# Patient Record
Sex: MALE | Race: WHITE | Employment: OTHER | URBAN - METROPOLITAN AREA
[De-identification: names, ages, dates, MRNs, and addresses within clinical notes are randomized per-mention and may not be internally consistent; named-entity substitution may affect disease eponyms.]

---

## 2017-02-06 RX ORDER — DICLOFENAC SODIUM 75 MG/1
TABLET, DELAYED RELEASE ORAL
Qty: 60 TABLET | Refills: 0 | Status: SHIPPED | OUTPATIENT
Start: 2017-02-06 | End: 2017-09-27

## 2017-03-22 ENCOUNTER — OFFICE VISIT (OUTPATIENT)
Dept: FAMILY MEDICINE CLINIC | Age: 67
End: 2017-03-22

## 2017-03-22 VITALS
TEMPERATURE: 98.2 F | HEIGHT: 68 IN | DIASTOLIC BLOOD PRESSURE: 78 MMHG | BODY MASS INDEX: 43.95 KG/M2 | HEART RATE: 88 BPM | RESPIRATION RATE: 14 BRPM | WEIGHT: 290 LBS | SYSTOLIC BLOOD PRESSURE: 130 MMHG

## 2017-03-22 DIAGNOSIS — Z01.818 PRE-OP EVALUATION: Primary | ICD-10-CM

## 2017-03-22 PROCEDURE — G8427 DOCREV CUR MEDS BY ELIG CLIN: HCPCS | Performed by: INTERNAL MEDICINE

## 2017-03-22 PROCEDURE — G8484 FLU IMMUNIZE NO ADMIN: HCPCS | Performed by: INTERNAL MEDICINE

## 2017-03-22 PROCEDURE — 99214 OFFICE O/P EST MOD 30 MIN: CPT | Performed by: INTERNAL MEDICINE

## 2017-03-22 PROCEDURE — 3017F COLORECTAL CA SCREEN DOC REV: CPT | Performed by: INTERNAL MEDICINE

## 2017-03-22 PROCEDURE — 4040F PNEUMOC VAC/ADMIN/RCVD: CPT | Performed by: INTERNAL MEDICINE

## 2017-03-22 PROCEDURE — G8419 CALC BMI OUT NRM PARAM NOF/U: HCPCS | Performed by: INTERNAL MEDICINE

## 2017-03-22 PROCEDURE — 1036F TOBACCO NON-USER: CPT | Performed by: INTERNAL MEDICINE

## 2017-03-22 RX ORDER — CYCLOBENZAPRINE HCL 10 MG
10 TABLET ORAL 2 TIMES DAILY
COMMUNITY
Start: 2017-03-17 | End: 2017-09-27

## 2017-03-22 ASSESSMENT — ENCOUNTER SYMPTOMS
COUGH: 0
NAUSEA: 0
SHORTNESS OF BREATH: 0
BACK PAIN: 1
EYE REDNESS: 0
ABDOMINAL PAIN: 0

## 2017-07-12 DIAGNOSIS — R73.9 HYPERGLYCEMIA: ICD-10-CM

## 2017-07-12 DIAGNOSIS — E03.8 SUBCLINICAL HYPOTHYROIDISM: ICD-10-CM

## 2017-07-12 DIAGNOSIS — M1A.0790 IDIOPATHIC CHRONIC GOUT OF ANKLE WITHOUT TOPHUS, UNSPECIFIED LATERALITY: ICD-10-CM

## 2017-07-12 DIAGNOSIS — E55.9 VITAMIN D DEFICIENCY: Primary | ICD-10-CM

## 2017-07-12 DIAGNOSIS — E78.00 PURE HYPERCHOLESTEROLEMIA: ICD-10-CM

## 2017-07-12 RX ORDER — ERGOCALCIFEROL 1.25 MG/1
CAPSULE ORAL
Qty: 24 CAPSULE | Refills: 2 | OUTPATIENT
Start: 2017-07-12

## 2017-07-12 RX ORDER — ALLOPURINOL 300 MG/1
TABLET ORAL
Qty: 180 TABLET | Refills: 0 | Status: SHIPPED | OUTPATIENT
Start: 2017-07-12 | End: 2017-11-27 | Stop reason: SDUPTHER

## 2017-09-21 DIAGNOSIS — E55.9 VITAMIN D DEFICIENCY: ICD-10-CM

## 2017-09-21 DIAGNOSIS — E03.8 SUBCLINICAL HYPOTHYROIDISM: ICD-10-CM

## 2017-09-21 DIAGNOSIS — E78.00 PURE HYPERCHOLESTEROLEMIA: ICD-10-CM

## 2017-09-21 DIAGNOSIS — M1A.0790 IDIOPATHIC CHRONIC GOUT OF ANKLE WITHOUT TOPHUS, UNSPECIFIED LATERALITY: ICD-10-CM

## 2017-09-21 DIAGNOSIS — R73.9 HYPERGLYCEMIA: ICD-10-CM

## 2017-09-21 LAB
A/G RATIO: 1.7 (ref 1.1–2.2)
ALBUMIN SERPL-MCNC: 3.9 G/DL (ref 3.4–5)
ALP BLD-CCNC: 68 U/L (ref 40–129)
ALT SERPL-CCNC: 23 U/L (ref 10–40)
ANION GAP SERPL CALCULATED.3IONS-SCNC: 14 MMOL/L (ref 3–16)
AST SERPL-CCNC: 18 U/L (ref 15–37)
BASOPHILS ABSOLUTE: 0 K/UL (ref 0–0.2)
BASOPHILS RELATIVE PERCENT: 0.5 %
BILIRUB SERPL-MCNC: 0.6 MG/DL (ref 0–1)
BUN BLDV-MCNC: 18 MG/DL (ref 7–20)
CALCIUM SERPL-MCNC: 8.9 MG/DL (ref 8.3–10.6)
CHLORIDE BLD-SCNC: 102 MMOL/L (ref 99–110)
CO2: 27 MMOL/L (ref 21–32)
CREAT SERPL-MCNC: 1 MG/DL (ref 0.8–1.3)
EOSINOPHILS ABSOLUTE: 0.3 K/UL (ref 0–0.6)
EOSINOPHILS RELATIVE PERCENT: 5.1 %
ESTIMATED AVERAGE GLUCOSE: 111.2 MG/DL
GFR AFRICAN AMERICAN: >60
GFR NON-AFRICAN AMERICAN: >60
GLOBULIN: 2.3 G/DL
GLUCOSE BLD-MCNC: 129 MG/DL (ref 70–99)
HBA1C MFR BLD: 5.5 %
HCT VFR BLD CALC: 43.9 % (ref 40.5–52.5)
HEMOGLOBIN: 15 G/DL (ref 13.5–17.5)
LYMPHOCYTES ABSOLUTE: 2.6 K/UL (ref 1–5.1)
LYMPHOCYTES RELATIVE PERCENT: 38 %
MCH RBC QN AUTO: 32.4 PG (ref 26–34)
MCHC RBC AUTO-ENTMCNC: 34.2 G/DL (ref 31–36)
MCV RBC AUTO: 94.7 FL (ref 80–100)
MONOCYTES ABSOLUTE: 0.5 K/UL (ref 0–1.3)
MONOCYTES RELATIVE PERCENT: 7.5 %
NEUTROPHILS ABSOLUTE: 3.3 K/UL (ref 1.7–7.7)
NEUTROPHILS RELATIVE PERCENT: 48.9 %
PDW BLD-RTO: 14 % (ref 12.4–15.4)
PLATELET # BLD: 168 K/UL (ref 135–450)
PMV BLD AUTO: 10.1 FL (ref 5–10.5)
POTASSIUM SERPL-SCNC: 4.3 MMOL/L (ref 3.5–5.1)
RBC # BLD: 4.64 M/UL (ref 4.2–5.9)
SODIUM BLD-SCNC: 143 MMOL/L (ref 136–145)
T4 FREE: 1.1 NG/DL (ref 0.9–1.8)
TOTAL PROTEIN: 6.2 G/DL (ref 6.4–8.2)
TSH REFLEX: 7.9 UIU/ML (ref 0.27–4.2)
URIC ACID, SERUM: 5.5 MG/DL (ref 3.5–7.2)
VITAMIN D 25-HYDROXY: 31.3 NG/ML
WBC # BLD: 6.8 K/UL (ref 4–11)

## 2017-09-27 ENCOUNTER — OFFICE VISIT (OUTPATIENT)
Dept: FAMILY MEDICINE CLINIC | Age: 67
End: 2017-09-27

## 2017-09-27 VITALS
SYSTOLIC BLOOD PRESSURE: 142 MMHG | WEIGHT: 295 LBS | HEIGHT: 68 IN | HEART RATE: 96 BPM | DIASTOLIC BLOOD PRESSURE: 80 MMHG | BODY MASS INDEX: 44.71 KG/M2 | RESPIRATION RATE: 18 BRPM | OXYGEN SATURATION: 95 % | TEMPERATURE: 98.4 F

## 2017-09-27 DIAGNOSIS — Z23 NEED FOR INFLUENZA VACCINATION: Primary | ICD-10-CM

## 2017-09-27 DIAGNOSIS — G62.9 NEUROPATHY: ICD-10-CM

## 2017-09-27 LAB
FOLATE: 14.4 NG/ML (ref 4.78–24.2)
VITAMIN B-12: 549 PG/ML (ref 211–911)

## 2017-09-27 PROCEDURE — 3017F COLORECTAL CA SCREEN DOC REV: CPT | Performed by: INTERNAL MEDICINE

## 2017-09-27 PROCEDURE — G0008 ADMIN INFLUENZA VIRUS VAC: HCPCS | Performed by: INTERNAL MEDICINE

## 2017-09-27 PROCEDURE — 1123F ACP DISCUSS/DSCN MKR DOCD: CPT | Performed by: INTERNAL MEDICINE

## 2017-09-27 PROCEDURE — 1036F TOBACCO NON-USER: CPT | Performed by: INTERNAL MEDICINE

## 2017-09-27 PROCEDURE — G8419 CALC BMI OUT NRM PARAM NOF/U: HCPCS | Performed by: INTERNAL MEDICINE

## 2017-09-27 PROCEDURE — 90662 IIV NO PRSV INCREASED AG IM: CPT | Performed by: INTERNAL MEDICINE

## 2017-09-27 PROCEDURE — 4040F PNEUMOC VAC/ADMIN/RCVD: CPT | Performed by: INTERNAL MEDICINE

## 2017-09-27 PROCEDURE — 99214 OFFICE O/P EST MOD 30 MIN: CPT | Performed by: INTERNAL MEDICINE

## 2017-09-27 PROCEDURE — G8427 DOCREV CUR MEDS BY ELIG CLIN: HCPCS | Performed by: INTERNAL MEDICINE

## 2017-09-27 ASSESSMENT — ENCOUNTER SYMPTOMS
DIARRHEA: 0
CONSTIPATION: 0
WHEEZING: 0
SHORTNESS OF BREATH: 0

## 2017-09-27 ASSESSMENT — PATIENT HEALTH QUESTIONNAIRE - PHQ9
1. LITTLE INTEREST OR PLEASURE IN DOING THINGS: 0
SUM OF ALL RESPONSES TO PHQ9 QUESTIONS 1 & 2: 0
2. FEELING DOWN, DEPRESSED OR HOPELESS: 0
SUM OF ALL RESPONSES TO PHQ QUESTIONS 1-9: 0

## 2017-10-05 ENCOUNTER — TELEPHONE (OUTPATIENT)
Dept: FAMILY MEDICINE CLINIC | Age: 67
End: 2017-10-05

## 2017-10-05 NOTE — TELEPHONE ENCOUNTER
Pt called and stated he had blood work done and that depending on the results you were going to prescribe something for neuropathy  Please call pt

## 2017-10-10 ENCOUNTER — TELEPHONE (OUTPATIENT)
Dept: FAMILY MEDICINE CLINIC | Age: 67
End: 2017-10-10

## 2017-10-10 RX ORDER — GABAPENTIN 100 MG/1
CAPSULE ORAL
Qty: 60 CAPSULE | Refills: 0 | Status: SHIPPED | OUTPATIENT
Start: 2017-10-10 | End: 2017-11-08 | Stop reason: SDUPTHER

## 2017-10-10 NOTE — TELEPHONE ENCOUNTER
Controlled Substances Monitoring:      reviewed today  Spoke to pt  Will start the neurontin    rx sent to pharmacy  Fu in one month

## 2017-11-08 ENCOUNTER — TELEPHONE (OUTPATIENT)
Dept: FAMILY MEDICINE CLINIC | Age: 67
End: 2017-11-08

## 2017-11-08 RX ORDER — GABAPENTIN 100 MG/1
CAPSULE ORAL
Qty: 60 CAPSULE | Refills: 0 | Status: SHIPPED | OUTPATIENT
Start: 2017-11-08 | End: 2017-11-27

## 2017-11-08 RX ORDER — GABAPENTIN 100 MG/1
CAPSULE ORAL
Qty: 60 CAPSULE | Refills: 0 | Status: CANCELLED | OUTPATIENT
Start: 2017-11-08

## 2017-11-08 NOTE — TELEPHONE ENCOUNTER
Pt called and said he was prescribed gabapentin and he is taking 3 a day and needs a new script sent over to pharmacy. old script was 1 a day then 2 a day.  Pt has 3 pills left

## 2017-11-09 ENCOUNTER — TELEPHONE (OUTPATIENT)
Dept: FAMILY MEDICINE CLINIC | Age: 67
End: 2017-11-09

## 2017-11-09 NOTE — TELEPHONE ENCOUNTER
Spoke to pt  Unable to come in tmrw  Has appt on 11/27 and will come in then  Has been taking 3 gabapentin at bedtime  Is aware refill was for #60 pills

## 2017-11-27 ENCOUNTER — OFFICE VISIT (OUTPATIENT)
Dept: FAMILY MEDICINE CLINIC | Age: 67
End: 2017-11-27

## 2017-11-27 VITALS
HEART RATE: 100 BPM | OXYGEN SATURATION: 96 % | BODY MASS INDEX: 43.65 KG/M2 | HEIGHT: 68 IN | DIASTOLIC BLOOD PRESSURE: 84 MMHG | SYSTOLIC BLOOD PRESSURE: 136 MMHG | WEIGHT: 288 LBS | RESPIRATION RATE: 18 BRPM

## 2017-11-27 DIAGNOSIS — E03.9 HYPOTHYROIDISM, UNSPECIFIED TYPE: ICD-10-CM

## 2017-11-27 DIAGNOSIS — M48.00 SPINAL STENOSIS, UNSPECIFIED SPINAL REGION: ICD-10-CM

## 2017-11-27 DIAGNOSIS — G57.93 NEUROPATHIC PAIN OF BOTH LEGS: Primary | ICD-10-CM

## 2017-11-27 DIAGNOSIS — Z13.6 SCREENING FOR AAA (AORTIC ABDOMINAL ANEURYSM): ICD-10-CM

## 2017-11-27 DIAGNOSIS — E78.00 PURE HYPERCHOLESTEROLEMIA: ICD-10-CM

## 2017-11-27 PROCEDURE — 4040F PNEUMOC VAC/ADMIN/RCVD: CPT | Performed by: INTERNAL MEDICINE

## 2017-11-27 PROCEDURE — G8427 DOCREV CUR MEDS BY ELIG CLIN: HCPCS | Performed by: INTERNAL MEDICINE

## 2017-11-27 PROCEDURE — G8417 CALC BMI ABV UP PARAM F/U: HCPCS | Performed by: INTERNAL MEDICINE

## 2017-11-27 PROCEDURE — G8484 FLU IMMUNIZE NO ADMIN: HCPCS | Performed by: INTERNAL MEDICINE

## 2017-11-27 PROCEDURE — 1036F TOBACCO NON-USER: CPT | Performed by: INTERNAL MEDICINE

## 2017-11-27 PROCEDURE — 99214 OFFICE O/P EST MOD 30 MIN: CPT | Performed by: INTERNAL MEDICINE

## 2017-11-27 PROCEDURE — 1123F ACP DISCUSS/DSCN MKR DOCD: CPT | Performed by: INTERNAL MEDICINE

## 2017-11-27 PROCEDURE — 3017F COLORECTAL CA SCREEN DOC REV: CPT | Performed by: INTERNAL MEDICINE

## 2017-11-27 RX ORDER — LEVOTHYROXINE SODIUM 100 UG/1
1 CAPSULE ORAL DAILY
Qty: 30 CAPSULE | Refills: 3 | Status: SHIPPED | OUTPATIENT
Start: 2017-11-27 | End: 2018-04-18 | Stop reason: SDUPTHER

## 2017-11-27 RX ORDER — ALLOPURINOL 300 MG/1
300 TABLET ORAL DAILY
Qty: 90 TABLET | Refills: 0 | COMMUNITY
Start: 2017-11-27 | End: 2018-11-14 | Stop reason: SDUPTHER

## 2017-11-27 ASSESSMENT — ENCOUNTER SYMPTOMS
VOMITING: 0
NAUSEA: 0
SHORTNESS OF BREATH: 0
WHEEZING: 0

## 2017-11-27 NOTE — PROGRESS NOTES
11/27/2017    This is a 75 y.o. male   Chief Complaint   Patient presents with    Peripheral Neuropathy     discuss gabapentin   .taking gabapentin    Currently  Taking 300mg gabapentin for neuropathy and it is working well. No numbness /tingling in the feet after her takes the med  By the end of the day symptoms come back sometimes strong. Does  Not wake him up    Has a pinched nerve in the back , had a mri back recently. Dr jesus - was sent to  [facility] ( spine )   Saw him nov  15,  Was told he had a pinched nerve. Had been taking flexeril and stopped taking it  Doing PT  Now. Helping. Had back surgery 2013 or 14  . Getting better overall  Was told to come back if it gets worse. Father had AAA,  Pt never been screened    Allopurinol  300 mg /day instead of 600 mg/day and uric acid level was ok  At  5.5        HPI    Review of Systems   Constitutional: Negative for appetite change and unexpected weight change. Respiratory: Negative for shortness of breath and wheezing. Gastrointestinal: Negative for nausea and vomiting. Psychiatric/Behavioral: Negative for dysphoric mood. The patient is not nervous/anxious. Past Medical History:   Diagnosis Date    Allergic rhinitis, seasonal     Bacterial meningitis     HX OF     Congenital absence of kidney     born with one kidney    Diverticulitis, colon     Gout     Gynecomastia     Hx of blood clots     postop knee replacement    Hydrocele, left     Lumbar disc disorder     Spermatocele     LEFT    Talipes cavus     Tinnitus     Wears contact lenses        Prior to Visit Medications    Medication Sig Taking? Authorizing Provider   gabapentin (NEURONTIN) 100 MG capsule 2 pill nightly  Patient taking differently: Take 300 mg by mouth nightly 2 pill nightly Yes Lawyer Tessa MD   allopurinol (ZYLOPRIM) 300 MG tablet TAKE ONE TABLET BY MOUTH TWICE A DAY Yes Lawyer Tessa MD   Psyllium (METAMUCIL PO) Take  by mouth daily.  Yes PO) Take  by mouth daily.  Multiple Vitamins-Minerals (MULTIVITAL) TABS Take 1 tablet by mouth daily.  Melatonin-Pyridoxine (MELATIN) 3-1 MG TABS Take 3 mg by mouth as needed. No current facility-administered medications for this visit. Allergies   Allergen Reactions    No Known Allergies        /84 (Site: Right Arm, Position: Sitting, Cuff Size: Large Adult)   Pulse 100   Resp 18   Ht 5' 8\" (1.727 m)   Wt 288 lb (130.6 kg)   SpO2 96%   BMI 43.79 kg/m²     Physical Exam   Constitutional: He appears well-developed and well-nourished. HENT:   Head: Normocephalic and atraumatic. Cardiovascular: Normal rate and regular rhythm. No murmur heard. Pulmonary/Chest: Breath sounds normal. He has no wheezes. Skin: Skin is warm and dry. Psychiatric: He has a normal mood and affect. His behavior is normal. Judgment and thought content normal.   has gang cyst in the left hand ,  Palm  Not painful    Thyroid not enlarged    abd large scar,  No pain  No palpable aorta  Large vent hernia    Wt Readings from Last 3 Encounters:   17 288 lb (130.6 kg)   17 295 lb (133.8 kg)   17 290 lb (131.5 kg)       BP Readings from Last 3 Encounters:   17 136/84   17 (!) 142/80   17 130/78         Garrett Henriquez was seen today for peripheral neuropathy. Diagnoses and all orders for this visit:    Neuropathic pain of both legs ([address]- on neurontin  300 mg /night      Screening for AAA (aortic abdominal aneurysm) father  AAA  -     US Abdominal Aorta;  Future    Spinal stenosis, unspecified spinal region    Hypothyroidism  tsh was  7.90  Brother  of aggressive thyroid cancer  -start levothyroxine   100 mcg /day  -re ck tsh 6 week      Will refill the neurontin when due   Get us to ro AAA    Leave the gang cyst alone,  No surgery needed    Fu in 3 months

## 2017-12-04 ENCOUNTER — TELEPHONE (OUTPATIENT)
Dept: FAMILY MEDICINE CLINIC | Age: 67
End: 2017-12-04

## 2017-12-04 RX ORDER — ALLOPURINOL 300 MG/1
300 TABLET ORAL EVERY EVENING
Qty: 30 TABLET | Refills: 1 | Status: SHIPPED | OUTPATIENT
Start: 2017-12-04 | End: 2018-01-12 | Stop reason: SDUPTHER

## 2017-12-04 NOTE — TELEPHONE ENCOUNTER
Pt called said he is a pt with dr Krish Rowe and the next time he needed to order gabepentin to call and she was going to increase it when out. He would like this sent to pharm below        [facility] [address].

## 2017-12-05 ENCOUNTER — TELEPHONE (OUTPATIENT)
Dept: FAMILY MEDICINE CLINIC | Age: 67
End: 2017-12-05

## 2017-12-05 RX ORDER — GABAPENTIN 300 MG/1
300 CAPSULE ORAL NIGHTLY
Qty: 30 CAPSULE | Refills: 1 | Status: SHIPPED | OUTPATIENT
Start: 2017-12-05 | End: 2018-02-08 | Stop reason: SDUPTHER

## 2017-12-05 RX ORDER — GABAPENTIN 100 MG/1
CAPSULE ORAL
Qty: 60 CAPSULE | Refills: 0 | Status: CANCELLED | OUTPATIENT
Start: 2017-12-05

## 2017-12-11 ENCOUNTER — HOSPITAL ENCOUNTER (OUTPATIENT)
Dept: ULTRASOUND IMAGING | Age: 67
Discharge: HOME | End: 2017-12-11
Attending: INTERNAL MEDICINE | Admitting: INTERNAL MEDICINE

## 2017-12-11 DIAGNOSIS — Z13.6 SCREENING FOR AAA (AORTIC ABDOMINAL ANEURYSM): ICD-10-CM

## 2017-12-11 DIAGNOSIS — Z13.6 ENCOUNTER FOR SCREENING FOR CARDIOVASCULAR DISORDERS: ICD-10-CM

## 2018-01-12 ENCOUNTER — OFFICE VISIT (OUTPATIENT)
Dept: FAMILY MEDICINE CLINIC | Age: 68
End: 2018-01-12

## 2018-01-12 VITALS
HEART RATE: 98 BPM | OXYGEN SATURATION: 96 % | BODY MASS INDEX: 44.71 KG/M2 | SYSTOLIC BLOOD PRESSURE: 160 MMHG | DIASTOLIC BLOOD PRESSURE: 80 MMHG | HEIGHT: 68 IN | RESPIRATION RATE: 18 BRPM | WEIGHT: 295 LBS

## 2018-01-12 DIAGNOSIS — S06.5XAA SUBDURAL HEMATOMA: Primary | ICD-10-CM

## 2018-01-12 DIAGNOSIS — R68.84 JAW PAIN: ICD-10-CM

## 2018-01-12 DIAGNOSIS — R51.9 ACUTE INTRACTABLE HEADACHE, UNSPECIFIED HEADACHE TYPE: ICD-10-CM

## 2018-01-12 PROCEDURE — G8484 FLU IMMUNIZE NO ADMIN: HCPCS | Performed by: INTERNAL MEDICINE

## 2018-01-12 PROCEDURE — 4040F PNEUMOC VAC/ADMIN/RCVD: CPT | Performed by: INTERNAL MEDICINE

## 2018-01-12 PROCEDURE — 1036F TOBACCO NON-USER: CPT | Performed by: INTERNAL MEDICINE

## 2018-01-12 PROCEDURE — G8417 CALC BMI ABV UP PARAM F/U: HCPCS | Performed by: INTERNAL MEDICINE

## 2018-01-12 PROCEDURE — 3017F COLORECTAL CA SCREEN DOC REV: CPT | Performed by: INTERNAL MEDICINE

## 2018-01-12 PROCEDURE — 99214 OFFICE O/P EST MOD 30 MIN: CPT | Performed by: INTERNAL MEDICINE

## 2018-01-12 PROCEDURE — 1123F ACP DISCUSS/DSCN MKR DOCD: CPT | Performed by: INTERNAL MEDICINE

## 2018-01-12 PROCEDURE — G8427 DOCREV CUR MEDS BY ELIG CLIN: HCPCS | Performed by: INTERNAL MEDICINE

## 2018-01-12 RX ORDER — HYDROCODONE BITARTRATE AND ACETAMINOPHEN 5; 325 MG/1; MG/1
1 TABLET ORAL
COMMUNITY
Start: 2018-01-10 | End: 2018-01-17

## 2018-01-12 ASSESSMENT — ENCOUNTER SYMPTOMS
SHORTNESS OF BREATH: 0
DIARRHEA: 0
WHEEZING: 0
CONSTIPATION: 1

## 2018-01-17 ENCOUNTER — TELEPHONE (OUTPATIENT)
Dept: FAMILY MEDICINE CLINIC | Age: 68
End: 2018-01-17

## 2018-01-17 ENCOUNTER — HOSPITAL ENCOUNTER (OUTPATIENT)
Dept: CT IMAGING | Age: 68
Discharge: HOME | End: 2018-01-17
Admitting: INTERNAL MEDICINE

## 2018-01-17 ENCOUNTER — OFFICE VISIT (OUTPATIENT)
Dept: FAMILY MEDICINE CLINIC | Age: 68
End: 2018-01-17

## 2018-01-17 VITALS
BODY MASS INDEX: 44.25 KG/M2 | DIASTOLIC BLOOD PRESSURE: 86 MMHG | WEIGHT: 292 LBS | SYSTOLIC BLOOD PRESSURE: 144 MMHG | OXYGEN SATURATION: 99 % | HEART RATE: 97 BPM | HEIGHT: 68 IN | RESPIRATION RATE: 16 BRPM

## 2018-01-17 DIAGNOSIS — S06.5XAA SUBDURAL HEMATOMA: Primary | ICD-10-CM

## 2018-01-17 DIAGNOSIS — R68.84 JAW PAIN: Primary | ICD-10-CM

## 2018-01-17 DIAGNOSIS — S06.5XAA SUBDURAL HEMATOMA: ICD-10-CM

## 2018-01-17 DIAGNOSIS — G44.329 CHRONIC POST-TRAUMATIC HEADACHE, NOT INTRACTABLE: ICD-10-CM

## 2018-01-17 DIAGNOSIS — R68.84 JAW PAIN: ICD-10-CM

## 2018-01-17 DIAGNOSIS — I62.00 NONTRAUMATIC SUBDURAL HEMORRHAGE (HCC): ICD-10-CM

## 2018-01-17 PROCEDURE — 3017F COLORECTAL CA SCREEN DOC REV: CPT | Performed by: INTERNAL MEDICINE

## 2018-01-17 PROCEDURE — 1123F ACP DISCUSS/DSCN MKR DOCD: CPT | Performed by: INTERNAL MEDICINE

## 2018-01-17 PROCEDURE — G8484 FLU IMMUNIZE NO ADMIN: HCPCS | Performed by: INTERNAL MEDICINE

## 2018-01-17 PROCEDURE — 99214 OFFICE O/P EST MOD 30 MIN: CPT | Performed by: INTERNAL MEDICINE

## 2018-01-17 PROCEDURE — 4040F PNEUMOC VAC/ADMIN/RCVD: CPT | Performed by: INTERNAL MEDICINE

## 2018-01-17 PROCEDURE — G8417 CALC BMI ABV UP PARAM F/U: HCPCS | Performed by: INTERNAL MEDICINE

## 2018-01-17 PROCEDURE — G8427 DOCREV CUR MEDS BY ELIG CLIN: HCPCS | Performed by: INTERNAL MEDICINE

## 2018-01-17 PROCEDURE — 1036F TOBACCO NON-USER: CPT | Performed by: INTERNAL MEDICINE

## 2018-01-17 RX ORDER — OXYCODONE HYDROCHLORIDE AND ACETAMINOPHEN 5; 325 MG/1; MG/1
TABLET ORAL
Qty: 30 TABLET | Refills: 0 | Status: SHIPPED | OUTPATIENT
Start: 2018-01-17 | End: 2018-01-26 | Stop reason: SDUPTHER

## 2018-01-17 ASSESSMENT — ENCOUNTER SYMPTOMS
NAUSEA: 1
VOMITING: 0
EYE PAIN: 0

## 2018-01-17 NOTE — PROGRESS NOTES
(METAMUCIL PO) Take  by mouth daily. Yes Historical Provider, MD   Multiple Vitamins-Minerals (MULTIVITAL) TABS Take 1 tablet by mouth daily. Yes Historical Provider, MD       Past Surgical History:   Procedure Laterality Date    BREAST LUMPECTOMY Left 08/12/2014    lipoma    BREAST SURGERY Right 2/4/15    removal of lipoma right breast.    CHOLECYSTECTOMY      COLECTOMY  1996    partial for diverticulitis    COLONOSCOPY      HYDROCELE EXCISION      and spermatacele excision    KNEE ARTHROSCOPY  9/12    left --dr Mckenna Oas HISTORY  10/21/2013    BILATERAL DECOMPRESSIVE LUMBAR LAMINECTOMY L4-L5, POSSIBLE    TOTAL KNEE ARTHROPLASTY Left 5/17/13    [facility]        Social History     Social History    Marital status:      Spouse name: Nancy Calle Number of children: 4    Years of education: N/A     Occupational History    Retired--Construction       Social History Main Topics    Smoking status: Never Smoker    Smokeless tobacco: Never Used      Comment: counseled on tobacco exposure avoidance    Alcohol use 0.0 oz/week      Comment: Ocassionally     Drug use: No    Sexual activity: Yes     Partners: Female     Other Topics Concern    Not on file     Social History Narrative    No narrative on file       Family History   Problem Relation Age of Onset    Cancer Mother 59     LUNG    Other Father 72     AORTIC ANEURYSM    Cancer Sister      BREAST     Other Brother      HIV    Arthritis Other     Kidney Disease Other        Current Outpatient Prescriptions   Medication Sig Dispense Refill    Acetaminophen (TYLENOL) 325 MG CAPS Take 325-650 mg by mouth      HYDROcodone-acetaminophen (NORCO) 5-325 MG per tablet Take 1 tablet by mouth.       gabapentin (NEURONTIN) 300 MG capsule Take 1 capsule by mouth nightly 30 capsule 1    allopurinol (ZYLOPRIM) 300 MG tablet Take 1 tablet by mouth daily 90 taken neurontin  For  8 days. Repeat ct showed resolving subdural  maxofacial ct   No fx  See oral surgery  About jaw issue  Family will try to get pt seen with [facility] who he has seen before  otw has appt in  2 weeks at [facility].

## 2018-01-26 DIAGNOSIS — S06.5XAA SUBDURAL HEMATOMA: ICD-10-CM

## 2018-01-26 DIAGNOSIS — G44.329 CHRONIC POST-TRAUMATIC HEADACHE, NOT INTRACTABLE: ICD-10-CM

## 2018-01-26 RX ORDER — OXYCODONE HYDROCHLORIDE AND ACETAMINOPHEN 5; 325 MG/1; MG/1
TABLET ORAL
Qty: 16 TABLET | Refills: 0 | Status: SHIPPED | OUTPATIENT
Start: 2018-01-26 | End: 2018-01-29

## 2018-02-06 DIAGNOSIS — E03.9 HYPOTHYROIDISM, UNSPECIFIED TYPE: ICD-10-CM

## 2018-02-06 LAB — TSH SERPL DL<=0.05 MIU/L-ACNC: 2.27 UIU/ML (ref 0.27–4.2)

## 2018-02-09 RX ORDER — GABAPENTIN 300 MG/1
CAPSULE ORAL
Qty: 30 CAPSULE | Refills: 0 | Status: SHIPPED | OUTPATIENT
Start: 2018-02-09 | End: 2018-03-12 | Stop reason: SDUPTHER

## 2018-02-27 ENCOUNTER — OFFICE VISIT (OUTPATIENT)
Dept: FAMILY MEDICINE CLINIC | Age: 68
End: 2018-02-27

## 2018-02-27 VITALS
HEIGHT: 68 IN | HEART RATE: 96 BPM | BODY MASS INDEX: 45.01 KG/M2 | RESPIRATION RATE: 16 BRPM | WEIGHT: 297 LBS | OXYGEN SATURATION: 96 % | SYSTOLIC BLOOD PRESSURE: 144 MMHG | DIASTOLIC BLOOD PRESSURE: 80 MMHG

## 2018-02-27 DIAGNOSIS — E03.8 OTHER SPECIFIED HYPOTHYROIDISM: Primary | ICD-10-CM

## 2018-02-27 DIAGNOSIS — S06.5XAA SUBDURAL HEMATOMA: ICD-10-CM

## 2018-02-27 DIAGNOSIS — R03.0 ELEVATED BP WITHOUT DIAGNOSIS OF HYPERTENSION: ICD-10-CM

## 2018-02-27 DIAGNOSIS — N52.9 ERECTILE DYSFUNCTION, UNSPECIFIED ERECTILE DYSFUNCTION TYPE: ICD-10-CM

## 2018-02-27 DIAGNOSIS — I82.492 DEEP VEIN THROMBOSIS (DVT) OF OTHER VEIN OF LEFT LOWER EXTREMITY, UNSPECIFIED CHRONICITY (HCC): ICD-10-CM

## 2018-02-27 PROCEDURE — 1123F ACP DISCUSS/DSCN MKR DOCD: CPT | Performed by: INTERNAL MEDICINE

## 2018-02-27 PROCEDURE — 3017F COLORECTAL CA SCREEN DOC REV: CPT | Performed by: INTERNAL MEDICINE

## 2018-02-27 PROCEDURE — G8484 FLU IMMUNIZE NO ADMIN: HCPCS | Performed by: INTERNAL MEDICINE

## 2018-02-27 PROCEDURE — 4040F PNEUMOC VAC/ADMIN/RCVD: CPT | Performed by: INTERNAL MEDICINE

## 2018-02-27 PROCEDURE — G8417 CALC BMI ABV UP PARAM F/U: HCPCS | Performed by: INTERNAL MEDICINE

## 2018-02-27 PROCEDURE — 1036F TOBACCO NON-USER: CPT | Performed by: INTERNAL MEDICINE

## 2018-02-27 PROCEDURE — 99214 OFFICE O/P EST MOD 30 MIN: CPT | Performed by: INTERNAL MEDICINE

## 2018-02-27 PROCEDURE — G8427 DOCREV CUR MEDS BY ELIG CLIN: HCPCS | Performed by: INTERNAL MEDICINE

## 2018-02-27 RX ORDER — SILDENAFIL CITRATE 20 MG/1
TABLET ORAL
Qty: 30 TABLET | Refills: 1 | Status: SHIPPED | OUTPATIENT
Start: 2018-02-27 | End: 2019-05-06 | Stop reason: SDUPTHER

## 2018-02-27 ASSESSMENT — ENCOUNTER SYMPTOMS
SHORTNESS OF BREATH: 0
DIARRHEA: 0
NAUSEA: 0
WHEEZING: 0

## 2018-02-27 NOTE — PROGRESS NOTES
2/27/2018    This is a 70 y.o. male   Chief Complaint   Patient presents with    Neurologic Problem     f/u neuropathy     Med really helps with both feet. On 300mg nightly , will keep him on the same dose. No problems in the day.   -gabapentin  Causes not to maintain an erection  Took viagra in the past too expensive      Saw neurosurgery at [facility] light really bother him. Can give him headaches. Occasionally gets headaches  Was told he did not need to come back. Will start  [employer]  Has been very inactive lately  Feeling better. HPI    Review of Systems   Constitutional: Negative for appetite change and unexpected weight change. Respiratory: Negative for shortness of breath and wheezing. Gastrointestinal: Negative for diarrhea and nausea. Neurological: Positive for dizziness (with lying down flat) and headaches. Past Medical History:   Diagnosis Date    Allergic rhinitis, seasonal     Bacterial meningitis     HX OF     Congenital absence of kidney     born with one kidney    Diverticulitis, colon     Gout     Gynecomastia     Hx of blood clots     postop knee replacement    Hydrocele, left     Lumbar disc disorder     Spermatocele     LEFT    Talipes cavus     Tinnitus     Wears contact lenses        Prior to Visit Medications    Medication Sig Taking? Authorizing Provider   gabapentin (NEURONTIN) 300 MG capsule TAKE ONE CAPSULE BY MOUTH ONCE NIGHTLY Yes Giuseppe Kelly MD   allopurinol (ZYLOPRIM) 300 MG tablet Take 1 tablet by mouth daily Yes Giuseppe Kelly MD   Levothyroxine Sodium 100 MCG CAPS Take 1 tablet by mouth Daily Yes Giuseppe Kelly MD   Psyllium (METAMUCIL PO) Take  by mouth daily. Yes Historical Provider, MD   Multiple Vitamins-Minerals (MULTIVITAL) TABS Take 1 tablet by mouth daily.  Yes Historical Provider, MD   Acetaminophen (TYLENOL) 325 MG CAPS Take 325-650 mg by mouth  Historical Provider, MD   Melatonin-Pyridoxine (Earney Lites) 3-1 MG MG CAPS Take 325-650 mg by mouth      Melatonin-Pyridoxine (MELATIN) 3-1 MG TABS Take 3 mg by mouth as needed. No current facility-administered medications for this visit. Allergies   Allergen Reactions    No Known Allergies        BP (!) 144/80 (Site: Left Arm, Position: Sitting, Cuff Size: Large Adult)   Pulse 96   Resp 16   Ht 5' 8\" (1.727 m)   Wt 297 lb (134.7 kg)   SpO2 96%   BMI 45.16 kg/m²     Physical Exam   Constitutional: He appears well-developed and well-nourished. HENT:   Head: Normocephalic and atraumatic. Eyes: Conjunctivae are normal.   Cardiovascular: Normal rate and regular rhythm. No murmur heard. Pulmonary/Chest: Breath sounds normal. He has no wheezes. Abdominal: Soft. There is no tenderness. Musculoskeletal: He exhibits edema (trace edema IN THE LEGS BILAT). Skin: Skin is warm and dry. Psychiatric: He has a normal mood and affect. His behavior is normal. Judgment and thought content normal.       Wt Readings from Last 3 Encounters:   02/27/18 297 lb (134.7 kg)   01/17/18 292 lb (132.5 kg)   01/12/18 295 lb (133.8 kg)       BP Readings from Last 3 Encounters:   02/27/18 (!) 144/80   01/17/18 (!) 144/86   01/12/18 (!) 160/80         Garrett Painting was seen today for neurologic problem. Diagnoses and all orders for this visit:    Other specified hypothyroidism  TSH  OK     Subdural hematoma (HCC)-1/9/18    Elevated BP without diagnosis of hypertension      Other orders  -     sildenafil (REVATIO) 20 MG tablet; Take  1-2 pills before relations. WILL NEED TO MONITOR BP.    ORDERED REVATIO  FOR ED DUE TO THE NEURONTIN. HAD DVT  2013 AFTER SURGERY,  NO LONGER ON BLOOD THINNERS.   FU IN  3 MONTHS

## 2018-03-13 RX ORDER — GABAPENTIN 300 MG/1
CAPSULE ORAL
Qty: 30 CAPSULE | Refills: 0 | Status: SHIPPED | OUTPATIENT
Start: 2018-03-13 | End: 2018-04-18 | Stop reason: SDUPTHER

## 2018-05-29 ENCOUNTER — OFFICE VISIT (OUTPATIENT)
Dept: FAMILY MEDICINE CLINIC | Age: 68
End: 2018-05-29

## 2018-05-29 VITALS
OXYGEN SATURATION: 96 % | SYSTOLIC BLOOD PRESSURE: 136 MMHG | DIASTOLIC BLOOD PRESSURE: 80 MMHG | RESPIRATION RATE: 16 BRPM | WEIGHT: 292 LBS | BODY MASS INDEX: 44.25 KG/M2 | HEIGHT: 68 IN | HEART RATE: 80 BPM

## 2018-05-29 DIAGNOSIS — R73.9 HYPERGLYCEMIA: ICD-10-CM

## 2018-05-29 DIAGNOSIS — E03.8 OTHER SPECIFIED HYPOTHYROIDISM: ICD-10-CM

## 2018-05-29 DIAGNOSIS — I10 ESSENTIAL HYPERTENSION: Primary | ICD-10-CM

## 2018-05-29 DIAGNOSIS — D72.829 LEUKOCYTOSIS, UNSPECIFIED TYPE: ICD-10-CM

## 2018-05-29 DIAGNOSIS — I10 ESSENTIAL HYPERTENSION: ICD-10-CM

## 2018-05-29 DIAGNOSIS — Z13.220 LIPID SCREENING: ICD-10-CM

## 2018-05-29 DIAGNOSIS — S06.5XAA SUBDURAL HEMATOMA: ICD-10-CM

## 2018-05-29 LAB
A/G RATIO: 2.2 (ref 1.1–2.2)
ALBUMIN SERPL-MCNC: 4.2 G/DL (ref 3.4–5)
ALP BLD-CCNC: 63 U/L (ref 40–129)
ALT SERPL-CCNC: 19 U/L (ref 10–40)
ANION GAP SERPL CALCULATED.3IONS-SCNC: 15 MMOL/L (ref 3–16)
AST SERPL-CCNC: 17 U/L (ref 15–37)
BASOPHILS ABSOLUTE: 0.1 K/UL (ref 0–0.2)
BASOPHILS RELATIVE PERCENT: 0.9 %
BILIRUB SERPL-MCNC: 0.4 MG/DL (ref 0–1)
BUN BLDV-MCNC: 16 MG/DL (ref 7–20)
CALCIUM SERPL-MCNC: 8.7 MG/DL (ref 8.3–10.6)
CHLORIDE BLD-SCNC: 107 MMOL/L (ref 99–110)
CHOLESTEROL, TOTAL: 129 MG/DL (ref 0–199)
CO2: 22 MMOL/L (ref 21–32)
CREAT SERPL-MCNC: 0.9 MG/DL (ref 0.8–1.3)
EOSINOPHILS ABSOLUTE: 0.2 K/UL (ref 0–0.6)
EOSINOPHILS RELATIVE PERCENT: 3.8 %
GFR AFRICAN AMERICAN: >60
GFR NON-AFRICAN AMERICAN: >60
GLOBULIN: 1.9 G/DL
GLUCOSE BLD-MCNC: 134 MG/DL (ref 70–99)
HCT VFR BLD CALC: 44.5 % (ref 40.5–52.5)
HDLC SERPL-MCNC: 47 MG/DL (ref 40–60)
HEMOGLOBIN: 15.2 G/DL (ref 13.5–17.5)
LDL CHOLESTEROL CALCULATED: 63 MG/DL
LYMPHOCYTES ABSOLUTE: 1.8 K/UL (ref 1–5.1)
LYMPHOCYTES RELATIVE PERCENT: 28.3 %
MCH RBC QN AUTO: 32.4 PG (ref 26–34)
MCHC RBC AUTO-ENTMCNC: 34.1 G/DL (ref 31–36)
MCV RBC AUTO: 94.9 FL (ref 80–100)
MONOCYTES ABSOLUTE: 0.5 K/UL (ref 0–1.3)
MONOCYTES RELATIVE PERCENT: 7.8 %
NEUTROPHILS ABSOLUTE: 3.8 K/UL (ref 1.7–7.7)
NEUTROPHILS RELATIVE PERCENT: 59.2 %
PDW BLD-RTO: 13.6 % (ref 12.4–15.4)
PLATELET # BLD: 179 K/UL (ref 135–450)
PMV BLD AUTO: 10.1 FL (ref 5–10.5)
POTASSIUM SERPL-SCNC: 4.4 MMOL/L (ref 3.5–5.1)
RBC # BLD: 4.69 M/UL (ref 4.2–5.9)
SODIUM BLD-SCNC: 144 MMOL/L (ref 136–145)
TOTAL PROTEIN: 6.1 G/DL (ref 6.4–8.2)
TRIGL SERPL-MCNC: 94 MG/DL (ref 0–150)
TSH REFLEX: 1.9 UIU/ML (ref 0.27–4.2)
VLDLC SERPL CALC-MCNC: 19 MG/DL
WBC # BLD: 6.5 K/UL (ref 4–11)

## 2018-05-29 PROCEDURE — 4040F PNEUMOC VAC/ADMIN/RCVD: CPT | Performed by: INTERNAL MEDICINE

## 2018-05-29 PROCEDURE — G8427 DOCREV CUR MEDS BY ELIG CLIN: HCPCS | Performed by: INTERNAL MEDICINE

## 2018-05-29 PROCEDURE — 99214 OFFICE O/P EST MOD 30 MIN: CPT | Performed by: INTERNAL MEDICINE

## 2018-05-29 PROCEDURE — 1123F ACP DISCUSS/DSCN MKR DOCD: CPT | Performed by: INTERNAL MEDICINE

## 2018-05-29 PROCEDURE — 1036F TOBACCO NON-USER: CPT | Performed by: INTERNAL MEDICINE

## 2018-05-29 PROCEDURE — 3017F COLORECTAL CA SCREEN DOC REV: CPT | Performed by: INTERNAL MEDICINE

## 2018-05-29 PROCEDURE — G8417 CALC BMI ABV UP PARAM F/U: HCPCS | Performed by: INTERNAL MEDICINE

## 2018-05-29 RX ORDER — AMLODIPINE BESYLATE 5 MG/1
5 TABLET ORAL DAILY
Qty: 30 TABLET | Refills: 3 | Status: SHIPPED | OUTPATIENT
Start: 2018-05-29 | End: 2018-06-29

## 2018-05-29 ASSESSMENT — ENCOUNTER SYMPTOMS
SHORTNESS OF BREATH: 0
WHEEZING: 0
DIARRHEA: 0
CONSTIPATION: 0

## 2018-05-30 LAB
ESTIMATED AVERAGE GLUCOSE: 114 MG/DL
HBA1C MFR BLD: 5.6 %

## 2018-06-29 ENCOUNTER — HOSPITAL ENCOUNTER (OUTPATIENT)
Dept: CT IMAGING | Age: 68
Discharge: HOME | End: 2018-06-29
Attending: INTERNAL MEDICINE | Admitting: INTERNAL MEDICINE

## 2018-06-29 ENCOUNTER — OFFICE VISIT (OUTPATIENT)
Dept: FAMILY MEDICINE CLINIC | Age: 68
End: 2018-06-29

## 2018-06-29 VITALS
SYSTOLIC BLOOD PRESSURE: 136 MMHG | OXYGEN SATURATION: 98 % | HEART RATE: 68 BPM | RESPIRATION RATE: 18 BRPM | HEIGHT: 68 IN | TEMPERATURE: 97.9 F | WEIGHT: 289.1 LBS | BODY MASS INDEX: 43.81 KG/M2 | DIASTOLIC BLOOD PRESSURE: 82 MMHG

## 2018-06-29 DIAGNOSIS — I10 ESSENTIAL HYPERTENSION: Primary | ICD-10-CM

## 2018-06-29 DIAGNOSIS — R10.9 RIGHT FLANK PAIN: ICD-10-CM

## 2018-06-29 DIAGNOSIS — I10 ESSENTIAL HYPERTENSION: ICD-10-CM

## 2018-06-29 DIAGNOSIS — R10.9 ABDOMINAL PAIN: ICD-10-CM

## 2018-06-29 DIAGNOSIS — R82.998 LEUKOCYTES IN URINE: ICD-10-CM

## 2018-06-29 DIAGNOSIS — M54.41 ACUTE RIGHT-SIDED LOW BACK PAIN WITH RIGHT-SIDED SCIATICA: ICD-10-CM

## 2018-06-29 LAB
ALBUMIN SERPL-MCNC: 4.3 G/DL (ref 3.4–5)
ANION GAP SERPL CALCULATED.3IONS-SCNC: 15 MMOL/L (ref 3–16)
BILIRUBIN, POC: ABNORMAL
BLOOD URINE, POC: ABNORMAL
BUN BLDV-MCNC: 19 MG/DL (ref 7–20)
CALCIUM SERPL-MCNC: 9.2 MG/DL (ref 8.3–10.6)
CHLORIDE BLD-SCNC: 105 MMOL/L (ref 99–110)
CLARITY, POC: CLEAR
CO2: 22 MMOL/L (ref 21–32)
COLOR, POC: YELLOW
CREAT SERPL-MCNC: 1 MG/DL (ref 0.8–1.3)
GFR AFRICAN AMERICAN: >60
GFR NON-AFRICAN AMERICAN: >60
GLUCOSE BLD-MCNC: 138 MG/DL (ref 70–99)
GLUCOSE URINE, POC: ABNORMAL
KETONES, POC: ABNORMAL
LEUKOCYTE EST, POC: ABNORMAL
NITRITE, POC: ABNORMAL
PH, POC: 5
PHOSPHORUS: 3.9 MG/DL (ref 2.5–4.9)
POTASSIUM SERPL-SCNC: 4.4 MMOL/L (ref 3.5–5.1)
PROTEIN, POC: ABNORMAL
SODIUM BLD-SCNC: 142 MMOL/L (ref 136–145)
SPECIFIC GRAVITY, POC: 1.02
UROBILINOGEN, POC: 0.2

## 2018-06-29 PROCEDURE — 4040F PNEUMOC VAC/ADMIN/RCVD: CPT | Performed by: INTERNAL MEDICINE

## 2018-06-29 PROCEDURE — G8427 DOCREV CUR MEDS BY ELIG CLIN: HCPCS | Performed by: INTERNAL MEDICINE

## 2018-06-29 PROCEDURE — 81002 URINALYSIS NONAUTO W/O SCOPE: CPT | Performed by: INTERNAL MEDICINE

## 2018-06-29 PROCEDURE — 1036F TOBACCO NON-USER: CPT | Performed by: INTERNAL MEDICINE

## 2018-06-29 PROCEDURE — 1123F ACP DISCUSS/DSCN MKR DOCD: CPT | Performed by: INTERNAL MEDICINE

## 2018-06-29 PROCEDURE — G8417 CALC BMI ABV UP PARAM F/U: HCPCS | Performed by: INTERNAL MEDICINE

## 2018-06-29 PROCEDURE — 3017F COLORECTAL CA SCREEN DOC REV: CPT | Performed by: INTERNAL MEDICINE

## 2018-06-29 PROCEDURE — G0444 DEPRESSION SCREEN ANNUAL: HCPCS | Performed by: INTERNAL MEDICINE

## 2018-06-29 PROCEDURE — 99214 OFFICE O/P EST MOD 30 MIN: CPT | Performed by: INTERNAL MEDICINE

## 2018-06-29 RX ORDER — LISINOPRIL 10 MG/1
10 TABLET ORAL DAILY
Qty: 30 TABLET | Refills: 1 | Status: SHIPPED | OUTPATIENT
Start: 2018-06-29 | End: 2018-09-04 | Stop reason: SDUPTHER

## 2018-06-29 RX ORDER — PREDNISONE 10 MG/1
TABLET ORAL
Qty: 32 TABLET | Refills: 0 | Status: SHIPPED | OUTPATIENT
Start: 2018-06-29 | End: 2018-08-14 | Stop reason: ALTCHOICE

## 2018-06-29 ASSESSMENT — PATIENT HEALTH QUESTIONNAIRE - PHQ9
10. IF YOU CHECKED OFF ANY PROBLEMS, HOW DIFFICULT HAVE THESE PROBLEMS MADE IT FOR YOU TO DO YOUR WORK, TAKE CARE OF THINGS AT HOME, OR GET ALONG WITH OTHER PEOPLE: 1
8. MOVING OR SPEAKING SO SLOWLY THAT OTHER PEOPLE COULD HAVE NOTICED. OR THE OPPOSITE, BEING SO FIGETY OR RESTLESS THAT YOU HAVE BEEN MOVING AROUND A LOT MORE THAN USUAL: 0
4. FEELING TIRED OR HAVING LITTLE ENERGY: 3
9. THOUGHTS THAT YOU WOULD BE BETTER OFF DEAD, OR OF HURTING YOURSELF: 0
5. POOR APPETITE OR OVEREATING: 0
1. LITTLE INTEREST OR PLEASURE IN DOING THINGS: 3
7. TROUBLE CONCENTRATING ON THINGS, SUCH AS READING THE NEWSPAPER OR WATCHING TELEVISION: 0
SUM OF ALL RESPONSES TO PHQ QUESTIONS 1-9: 12
6. FEELING BAD ABOUT YOURSELF - OR THAT YOU ARE A FAILURE OR HAVE LET YOURSELF OR YOUR FAMILY DOWN: 0
SUM OF ALL RESPONSES TO PHQ9 QUESTIONS 1 & 2: 6
2. FEELING DOWN, DEPRESSED OR HOPELESS: 3
3. TROUBLE FALLING OR STAYING ASLEEP: 3

## 2018-06-29 ASSESSMENT — ENCOUNTER SYMPTOMS
BACK PAIN: 1
SHORTNESS OF BREATH: 0
COUGH: 0
WHEEZING: 0

## 2018-07-01 LAB — URINE CULTURE, ROUTINE: NORMAL

## 2018-07-17 ENCOUNTER — OFFICE VISIT (OUTPATIENT)
Dept: ORTHOPEDIC SURGERY | Age: 68
End: 2018-07-17

## 2018-07-17 ENCOUNTER — TELEPHONE (OUTPATIENT)
Dept: ORTHOPEDIC SURGERY | Age: 68
End: 2018-07-17

## 2018-07-17 VITALS — BODY MASS INDEX: 43.8 KG/M2 | WEIGHT: 289 LBS | HEIGHT: 68 IN

## 2018-07-17 DIAGNOSIS — Z98.1 STATUS POST LUMBAR SPINAL FUSION: ICD-10-CM

## 2018-07-17 DIAGNOSIS — M54.16 LUMBAR RADICULITIS: Primary | ICD-10-CM

## 2018-07-17 PROCEDURE — 3017F COLORECTAL CA SCREEN DOC REV: CPT | Performed by: NURSE PRACTITIONER

## 2018-07-17 PROCEDURE — G8427 DOCREV CUR MEDS BY ELIG CLIN: HCPCS | Performed by: NURSE PRACTITIONER

## 2018-07-17 PROCEDURE — 99213 OFFICE O/P EST LOW 20 MIN: CPT | Performed by: NURSE PRACTITIONER

## 2018-07-17 PROCEDURE — G8417 CALC BMI ABV UP PARAM F/U: HCPCS | Performed by: NURSE PRACTITIONER

## 2018-07-17 PROCEDURE — 1101F PT FALLS ASSESS-DOCD LE1/YR: CPT | Performed by: NURSE PRACTITIONER

## 2018-07-17 NOTE — PROGRESS NOTES
skin over his lumbar spine is normal without a surgical scar. He has 5/5 motor strength of bilateral lower extremities. He has a negative straight leg raise, bilaterally. 1+ deep tendon reflexes at knees. Sensation is intact to light touch L3 to S1 bilaterally. He has decreased sensation to touch on his right anterior thigh. He has no clonus. Hip range of motion painless. Imaging:  I reviewed CT images of the abdomen and pelvis obtained on 6/29/18 were reviewed in the office today. There is evidence of a right total hip arthroplasty hardware. No evidence of failure or disruption or loosening. Assessment:  Lumbar radiculitis  Meralgia paresthetica s/p right anterior RO    Plan:  I recommend obtaining an MRI without gadolinium name of the lumbar spine. Patient will be called with results listed further follow-up/treatment. He will increase his gabapentin to 3 times per day for the next 2 days to see if this helps his symptoms at all.

## 2018-07-19 ENCOUNTER — TELEPHONE (OUTPATIENT)
Dept: ORTHOPEDIC SURGERY | Age: 68
End: 2018-07-19

## 2018-07-19 RX ORDER — GABAPENTIN 300 MG/1
300 CAPSULE ORAL 3 TIMES DAILY
Qty: 90 CAPSULE | Refills: 1 | Status: SHIPPED | OUTPATIENT
Start: 2018-07-19 | End: 2018-11-14 | Stop reason: ALTCHOICE

## 2018-07-19 NOTE — TELEPHONE ENCOUNTER
Pt requesting a refill of Gabapentin   He has increased his previous script as directed so is out  pls call pt thanks

## 2018-08-08 ENCOUNTER — TELEPHONE (OUTPATIENT)
Dept: ORTHOPEDIC SURGERY | Age: 68
End: 2018-08-08

## 2018-08-08 DIAGNOSIS — Z98.1 STATUS POST LUMBAR SPINAL FUSION: ICD-10-CM

## 2018-08-08 DIAGNOSIS — M54.16 LUMBAR RADICULITIS: Primary | ICD-10-CM

## 2018-08-13 ENCOUNTER — TELEPHONE (OUTPATIENT)
Dept: ORTHOPEDIC SURGERY | Age: 68
End: 2018-08-13

## 2018-08-13 NOTE — TELEPHONE ENCOUNTER
Due to patient age, a bun and creatinine blood work need to be ordered. Pt has mri scheduled for 8/14/18.

## 2018-08-14 ENCOUNTER — OFFICE VISIT (OUTPATIENT)
Dept: FAMILY MEDICINE CLINIC | Age: 68
End: 2018-08-14

## 2018-08-14 VITALS
SYSTOLIC BLOOD PRESSURE: 138 MMHG | HEART RATE: 84 BPM | BODY MASS INDEX: 44.1 KG/M2 | DIASTOLIC BLOOD PRESSURE: 86 MMHG | OXYGEN SATURATION: 96 % | HEIGHT: 68 IN | RESPIRATION RATE: 16 BRPM | WEIGHT: 291 LBS

## 2018-08-14 DIAGNOSIS — I10 ESSENTIAL HYPERTENSION: ICD-10-CM

## 2018-08-14 DIAGNOSIS — G57.93 NEUROPATHIC PAIN OF BOTH LEGS: ICD-10-CM

## 2018-08-14 DIAGNOSIS — Z13.31 POSITIVE DEPRESSION SCREENING: ICD-10-CM

## 2018-08-14 DIAGNOSIS — M48.00 SPINAL STENOSIS, UNSPECIFIED SPINAL REGION: ICD-10-CM

## 2018-08-14 DIAGNOSIS — M54.41 ACUTE RIGHT-SIDED LOW BACK PAIN WITH RIGHT-SIDED SCIATICA: ICD-10-CM

## 2018-08-14 DIAGNOSIS — I10 ESSENTIAL HYPERTENSION: Primary | ICD-10-CM

## 2018-08-14 LAB
ALBUMIN SERPL-MCNC: 4.3 G/DL (ref 3.4–5)
ANION GAP SERPL CALCULATED.3IONS-SCNC: 12 MMOL/L (ref 3–16)
BUN BLDV-MCNC: 16 MG/DL (ref 7–20)
CALCIUM SERPL-MCNC: 9.2 MG/DL (ref 8.3–10.6)
CHLORIDE BLD-SCNC: 104 MMOL/L (ref 99–110)
CO2: 28 MMOL/L (ref 21–32)
CREAT SERPL-MCNC: 1.1 MG/DL (ref 0.8–1.3)
GFR AFRICAN AMERICAN: >60
GFR NON-AFRICAN AMERICAN: >60
GLUCOSE BLD-MCNC: 138 MG/DL (ref 70–99)
PHOSPHORUS: 3.4 MG/DL (ref 2.5–4.9)
POTASSIUM SERPL-SCNC: 4.6 MMOL/L (ref 3.5–5.1)
SODIUM BLD-SCNC: 144 MMOL/L (ref 136–145)

## 2018-08-14 PROCEDURE — G8427 DOCREV CUR MEDS BY ELIG CLIN: HCPCS | Performed by: INTERNAL MEDICINE

## 2018-08-14 PROCEDURE — 1101F PT FALLS ASSESS-DOCD LE1/YR: CPT | Performed by: INTERNAL MEDICINE

## 2018-08-14 PROCEDURE — 4040F PNEUMOC VAC/ADMIN/RCVD: CPT | Performed by: INTERNAL MEDICINE

## 2018-08-14 PROCEDURE — G8417 CALC BMI ABV UP PARAM F/U: HCPCS | Performed by: INTERNAL MEDICINE

## 2018-08-14 PROCEDURE — 1036F TOBACCO NON-USER: CPT | Performed by: INTERNAL MEDICINE

## 2018-08-14 PROCEDURE — 3017F COLORECTAL CA SCREEN DOC REV: CPT | Performed by: INTERNAL MEDICINE

## 2018-08-14 PROCEDURE — 99214 OFFICE O/P EST MOD 30 MIN: CPT | Performed by: INTERNAL MEDICINE

## 2018-08-14 ASSESSMENT — PATIENT HEALTH QUESTIONNAIRE - PHQ9
1. LITTLE INTEREST OR PLEASURE IN DOING THINGS: 0
2. FEELING DOWN, DEPRESSED OR HOPELESS: 0
SUM OF ALL RESPONSES TO PHQ QUESTIONS 1-9: 0
SUM OF ALL RESPONSES TO PHQ9 QUESTIONS 1 & 2: 0
SUM OF ALL RESPONSES TO PHQ QUESTIONS 1-9: 0

## 2018-08-14 ASSESSMENT — ENCOUNTER SYMPTOMS
SHORTNESS OF BREATH: 1
WHEEZING: 0
NAUSEA: 0
VOMITING: 0

## 2018-08-14 NOTE — PROGRESS NOTES
to office and joslyn    The patient is not depressed. Pt denies depression. He is not feeling well due to back pain.

## 2018-08-15 ENCOUNTER — HOSPITAL ENCOUNTER (OUTPATIENT)
Dept: MRI IMAGING | Age: 68
Discharge: HOME | End: 2018-08-15
Attending: NURSE PRACTITIONER | Admitting: NURSE PRACTITIONER

## 2018-08-15 DIAGNOSIS — M54.16 RADICULOPATHY OF LUMBAR REGION: ICD-10-CM

## 2018-08-15 DIAGNOSIS — M54.16 LUMBAR RADICULITIS: ICD-10-CM

## 2018-08-15 DIAGNOSIS — Z98.1 STATUS POST LUMBAR SPINAL FUSION: ICD-10-CM

## 2018-08-16 ENCOUNTER — TELEPHONE (OUTPATIENT)
Dept: ORTHOPEDIC SURGERY | Age: 68
End: 2018-08-16

## 2018-08-16 DIAGNOSIS — M54.16 LUMBAR RADICULITIS: Primary | ICD-10-CM

## 2018-08-16 NOTE — TELEPHONE ENCOUNTER
I called and spoke with the patient regarding his MRI results. He will try a right L3-4 TF ESTHER with Dr. Vitaliy Jeronimo. He will follow-up two weeks afterwards. Dx lumbar stenosis. I told him we would call him today to ry and get on the schedule please.

## 2018-08-28 ENCOUNTER — HOSPITAL ENCOUNTER (OUTPATIENT)
Dept: INTERVENTIONAL RADIOLOGY/VASCULAR | Age: 68
Discharge: HOME | End: 2018-08-28
Attending: PAIN MEDICINE | Admitting: PAIN MEDICINE

## 2018-08-28 VITALS
HEIGHT: 68 IN | SYSTOLIC BLOOD PRESSURE: 122 MMHG | RESPIRATION RATE: 20 BRPM | BODY MASS INDEX: 40.77 KG/M2 | WEIGHT: 269 LBS | TEMPERATURE: 97 F | HEART RATE: 80 BPM | OXYGEN SATURATION: 97 % | DIASTOLIC BLOOD PRESSURE: 67 MMHG

## 2018-08-28 DIAGNOSIS — M54.16 LUMBAR RADICULITIS: ICD-10-CM

## 2018-08-28 DIAGNOSIS — M54.16 RADICULOPATHY OF LUMBAR REGION: ICD-10-CM

## 2018-08-28 PROCEDURE — 64484 NJX AA&/STRD TFRM EPI L/S EA: CPT | Performed by: PAIN MEDICINE

## 2018-08-28 PROCEDURE — 64483 NJX AA&/STRD TFRM EPI L/S 1: CPT | Performed by: PAIN MEDICINE

## 2018-08-28 RX ORDER — LIDOCAINE HYDROCHLORIDE 10 MG/ML
INJECTION, SOLUTION EPIDURAL; INFILTRATION; INTRACAUDAL; PERINEURAL DAILY PRN
Status: COMPLETED | OUTPATIENT
Start: 2018-08-28 | End: 2018-08-28

## 2018-08-28 RX ORDER — METHYLPREDNISOLONE ACETATE 80 MG/ML
INJECTION, SUSPENSION INTRA-ARTICULAR; INTRALESIONAL; INTRAMUSCULAR; SOFT TISSUE DAILY PRN
Status: COMPLETED | OUTPATIENT
Start: 2018-08-28 | End: 2018-08-28

## 2018-08-28 RX ADMIN — LIDOCAINE HYDROCHLORIDE 1 ML: 10 INJECTION, SOLUTION EPIDURAL; INFILTRATION; INTRACAUDAL; PERINEURAL at 14:30

## 2018-08-28 RX ADMIN — METHYLPREDNISOLONE ACETATE 80 MG: 80 INJECTION, SUSPENSION INTRA-ARTICULAR; INTRALESIONAL; INTRAMUSCULAR; SOFT TISSUE at 14:32

## 2018-08-28 ASSESSMENT — PAIN - FUNCTIONAL ASSESSMENT: PAIN_FUNCTIONAL_ASSESSMENT: 0-10

## 2018-09-04 RX ORDER — LISINOPRIL 10 MG/1
TABLET ORAL
Qty: 30 TABLET | Refills: 3 | Status: SHIPPED | OUTPATIENT
Start: 2018-09-04 | End: 2018-11-14

## 2018-09-12 ENCOUNTER — OFFICE VISIT (OUTPATIENT)
Dept: ORTHOPEDIC SURGERY | Age: 68
End: 2018-09-12

## 2018-09-12 VITALS
DIASTOLIC BLOOD PRESSURE: 81 MMHG | WEIGHT: 289 LBS | BODY MASS INDEX: 43.8 KG/M2 | HEIGHT: 68 IN | HEART RATE: 91 BPM | SYSTOLIC BLOOD PRESSURE: 137 MMHG

## 2018-09-12 DIAGNOSIS — M54.16 LUMBAR RADICULOPATHY: Primary | ICD-10-CM

## 2018-09-12 PROCEDURE — 1036F TOBACCO NON-USER: CPT | Performed by: NURSE PRACTITIONER

## 2018-09-12 PROCEDURE — G8427 DOCREV CUR MEDS BY ELIG CLIN: HCPCS | Performed by: NURSE PRACTITIONER

## 2018-09-12 PROCEDURE — 3017F COLORECTAL CA SCREEN DOC REV: CPT | Performed by: NURSE PRACTITIONER

## 2018-09-12 PROCEDURE — 4040F PNEUMOC VAC/ADMIN/RCVD: CPT | Performed by: NURSE PRACTITIONER

## 2018-09-12 PROCEDURE — 1101F PT FALLS ASSESS-DOCD LE1/YR: CPT | Performed by: NURSE PRACTITIONER

## 2018-09-12 PROCEDURE — 1123F ACP DISCUSS/DSCN MKR DOCD: CPT | Performed by: NURSE PRACTITIONER

## 2018-09-12 PROCEDURE — 99213 OFFICE O/P EST LOW 20 MIN: CPT | Performed by: NURSE PRACTITIONER

## 2018-09-12 PROCEDURE — G8417 CALC BMI ABV UP PARAM F/U: HCPCS | Performed by: NURSE PRACTITIONER

## 2018-09-12 NOTE — PROGRESS NOTES
anterior thigh. He has no clonus. Hip range of motion painless. Imaging:  I reviewed MRI images of the lumbar spine obtained on 8/15/18. There is moderate central stenosis L3-4 secondary to broad-based disc bulge. Moderate associated right-sided foraminal narrowing L3, L4, L5. He is status post L4-5 L5-S1 laminectomies. Assessment:  Lumbar radiculitis  Meralgia paresthetica s/p right anterior RO    Plan:  The patient would like to repeat his injection and will be scheduled accordingly.  (Right L3-4 TF ESTHER)

## 2018-09-17 ENCOUNTER — TELEPHONE (OUTPATIENT)
Dept: ORTHOPEDIC SURGERY | Age: 68
End: 2018-09-17

## 2018-09-25 ENCOUNTER — HOSPITAL ENCOUNTER (OUTPATIENT)
Dept: INTERVENTIONAL RADIOLOGY/VASCULAR | Age: 68
Discharge: HOME | End: 2018-09-25
Payer: MEDICARE

## 2018-09-25 VITALS
RESPIRATION RATE: 18 BRPM | WEIGHT: 255 LBS | SYSTOLIC BLOOD PRESSURE: 146 MMHG | HEART RATE: 71 BPM | DIASTOLIC BLOOD PRESSURE: 80 MMHG | HEIGHT: 68 IN | BODY MASS INDEX: 38.65 KG/M2 | TEMPERATURE: 97 F | OXYGEN SATURATION: 100 %

## 2018-09-25 DIAGNOSIS — M54.16 LUMBAR RADICULOPATHY: ICD-10-CM

## 2018-09-25 PROCEDURE — 6360000002 HC RX W HCPCS

## 2018-09-25 PROCEDURE — 2500000003 HC RX 250 WO HCPCS: Performed by: PAIN MEDICINE

## 2018-09-25 PROCEDURE — 64483 NJX AA&/STRD TFRM EPI L/S 1: CPT | Performed by: PAIN MEDICINE

## 2018-09-25 PROCEDURE — 64484 NJX AA&/STRD TFRM EPI L/S EA: CPT | Performed by: PAIN MEDICINE

## 2018-09-25 PROCEDURE — 6360000002 HC RX W HCPCS: Performed by: PAIN MEDICINE

## 2018-09-25 PROCEDURE — 2709999900

## 2018-09-25 PROCEDURE — 6360000004 HC RX CONTRAST MEDICATION: Performed by: PAIN MEDICINE

## 2018-09-25 PROCEDURE — 2500000003 HC RX 250 WO HCPCS

## 2018-09-25 RX ORDER — METHYLPREDNISOLONE ACETATE 80 MG/ML
INJECTION, SUSPENSION INTRA-ARTICULAR; INTRALESIONAL; INTRAMUSCULAR; SOFT TISSUE DAILY PRN
Status: COMPLETED | OUTPATIENT
Start: 2018-09-25 | End: 2018-09-25

## 2018-09-25 RX ORDER — LIDOCAINE HYDROCHLORIDE 10 MG/ML
INJECTION, SOLUTION EPIDURAL; INFILTRATION; INTRACAUDAL; PERINEURAL DAILY PRN
Status: COMPLETED | OUTPATIENT
Start: 2018-09-25 | End: 2018-09-25

## 2018-09-25 RX ADMIN — LIDOCAINE HYDROCHLORIDE 1 ML: 10 INJECTION, SOLUTION EPIDURAL; INFILTRATION; INTRACAUDAL; PERINEURAL at 15:31

## 2018-09-25 RX ADMIN — IOPAMIDOL 3 ML: 408 INJECTION, SOLUTION INTRATHECAL at 15:31

## 2018-09-25 RX ADMIN — METHYLPREDNISOLONE ACETATE 80 MG: 80 INJECTION, SUSPENSION INTRA-ARTICULAR; INTRALESIONAL; INTRAMUSCULAR; SOFT TISSUE at 15:32

## 2018-09-25 ASSESSMENT — PAIN - FUNCTIONAL ASSESSMENT: PAIN_FUNCTIONAL_ASSESSMENT: 0-10

## 2018-10-18 NOTE — TELEPHONE ENCOUNTER
Called and informed Pt w no PMH, , LMP 17 referred to ED from center for [facility] fetal [facility] after she had an US revealing ectopic pregnancy, pt reports mild pelvic discomfort 3/10 and vag bleeding since last week.

## 2018-11-08 ENCOUNTER — TELEPHONE (OUTPATIENT)
Dept: FAMILY MEDICINE CLINIC | Age: 68
End: 2018-11-08

## 2018-11-14 ENCOUNTER — OFFICE VISIT (OUTPATIENT)
Dept: FAMILY MEDICINE CLINIC | Age: 68
End: 2018-11-14
Payer: MEDICARE

## 2018-11-14 VITALS
DIASTOLIC BLOOD PRESSURE: 74 MMHG | RESPIRATION RATE: 14 BRPM | BODY MASS INDEX: 38.49 KG/M2 | HEART RATE: 80 BPM | HEIGHT: 68 IN | OXYGEN SATURATION: 96 % | SYSTOLIC BLOOD PRESSURE: 126 MMHG | WEIGHT: 254 LBS

## 2018-11-14 DIAGNOSIS — Z01.818 PRE-OP EXAMINATION: ICD-10-CM

## 2018-11-14 DIAGNOSIS — N20.0 NEPHROLITHIASIS: ICD-10-CM

## 2018-11-14 DIAGNOSIS — G57.93 NEUROPATHIC PAIN OF BOTH LEGS: ICD-10-CM

## 2018-11-14 DIAGNOSIS — M1A.0790 IDIOPATHIC CHRONIC GOUT OF ANKLE WITHOUT TOPHUS, UNSPECIFIED LATERALITY: ICD-10-CM

## 2018-11-14 DIAGNOSIS — E03.8 OTHER SPECIFIED HYPOTHYROIDISM: Primary | ICD-10-CM

## 2018-11-14 DIAGNOSIS — I10 ESSENTIAL HYPERTENSION: ICD-10-CM

## 2018-11-14 PROCEDURE — 3017F COLORECTAL CA SCREEN DOC REV: CPT | Performed by: INTERNAL MEDICINE

## 2018-11-14 PROCEDURE — G8417 CALC BMI ABV UP PARAM F/U: HCPCS | Performed by: INTERNAL MEDICINE

## 2018-11-14 PROCEDURE — G8428 CUR MEDS NOT DOCUMENT: HCPCS | Performed by: INTERNAL MEDICINE

## 2018-11-14 PROCEDURE — 90662 IIV NO PRSV INCREASED AG IM: CPT | Performed by: INTERNAL MEDICINE

## 2018-11-14 PROCEDURE — 99213 OFFICE O/P EST LOW 20 MIN: CPT | Performed by: INTERNAL MEDICINE

## 2018-11-14 PROCEDURE — G0008 ADMIN INFLUENZA VIRUS VAC: HCPCS | Performed by: INTERNAL MEDICINE

## 2018-11-14 PROCEDURE — 1101F PT FALLS ASSESS-DOCD LE1/YR: CPT | Performed by: INTERNAL MEDICINE

## 2018-11-14 PROCEDURE — G8482 FLU IMMUNIZE ORDER/ADMIN: HCPCS | Performed by: INTERNAL MEDICINE

## 2018-11-14 RX ORDER — MULTIVITAMIN WITH IRON
500 TABLET ORAL DAILY
COMMUNITY
End: 2019-02-11 | Stop reason: ALTCHOICE

## 2018-11-14 RX ORDER — LANOLIN ALCOHOL/MO/W.PET/CERES
1000 CREAM (GRAM) TOPICAL DAILY
COMMUNITY
End: 2020-01-22

## 2018-11-14 RX ORDER — ZINC GLUCONATE 50 MG
50 TABLET ORAL DAILY
Status: ON HOLD | COMMUNITY
End: 2019-04-08

## 2018-11-14 ASSESSMENT — PATIENT HEALTH QUESTIONNAIRE - PHQ9
1. LITTLE INTEREST OR PLEASURE IN DOING THINGS: 0
SUM OF ALL RESPONSES TO PHQ9 QUESTIONS 1 & 2: 0
SUM OF ALL RESPONSES TO PHQ QUESTIONS 1-9: 0
SUM OF ALL RESPONSES TO PHQ QUESTIONS 1-9: 0
2. FEELING DOWN, DEPRESSED OR HOPELESS: 0

## 2018-11-14 ASSESSMENT — ENCOUNTER SYMPTOMS
CONSTIPATION: 0
VOMITING: 0
COLOR CHANGE: 0
EYE PAIN: 0
SHORTNESS OF BREATH: 0
WHEEZING: 0
DIARRHEA: 0
NAUSEA: 0

## 2019-02-11 ENCOUNTER — OFFICE VISIT (OUTPATIENT)
Dept: FAMILY MEDICINE CLINIC | Age: 69
End: 2019-02-11
Payer: MEDICARE

## 2019-02-11 VITALS
HEART RATE: 94 BPM | RESPIRATION RATE: 14 BRPM | WEIGHT: 260 LBS | HEIGHT: 68 IN | DIASTOLIC BLOOD PRESSURE: 90 MMHG | BODY MASS INDEX: 39.4 KG/M2 | SYSTOLIC BLOOD PRESSURE: 152 MMHG | OXYGEN SATURATION: 96 %

## 2019-02-11 DIAGNOSIS — R73.03 PREDIABETES: ICD-10-CM

## 2019-02-11 DIAGNOSIS — L98.9 ARM SKIN LESION, LEFT: ICD-10-CM

## 2019-02-11 DIAGNOSIS — E03.9 HYPOTHYROIDISM, UNSPECIFIED TYPE: ICD-10-CM

## 2019-02-11 DIAGNOSIS — I10 ESSENTIAL HYPERTENSION: Primary | ICD-10-CM

## 2019-02-11 DIAGNOSIS — E03.8 OTHER SPECIFIED HYPOTHYROIDISM: ICD-10-CM

## 2019-02-11 DIAGNOSIS — Z12.83 SCREENING EXAM FOR SKIN CANCER: ICD-10-CM

## 2019-02-11 DIAGNOSIS — E78.00 PURE HYPERCHOLESTEROLEMIA: ICD-10-CM

## 2019-02-11 LAB — HBA1C MFR BLD: 5.5 %

## 2019-02-11 PROCEDURE — 1101F PT FALLS ASSESS-DOCD LE1/YR: CPT | Performed by: INTERNAL MEDICINE

## 2019-02-11 PROCEDURE — G8510 SCR DEP NEG, NO PLAN REQD: HCPCS | Performed by: INTERNAL MEDICINE

## 2019-02-11 PROCEDURE — G8427 DOCREV CUR MEDS BY ELIG CLIN: HCPCS | Performed by: INTERNAL MEDICINE

## 2019-02-11 PROCEDURE — 99214 OFFICE O/P EST MOD 30 MIN: CPT | Performed by: INTERNAL MEDICINE

## 2019-02-11 PROCEDURE — 4040F PNEUMOC VAC/ADMIN/RCVD: CPT | Performed by: INTERNAL MEDICINE

## 2019-02-11 PROCEDURE — 1123F ACP DISCUSS/DSCN MKR DOCD: CPT | Performed by: INTERNAL MEDICINE

## 2019-02-11 PROCEDURE — 1036F TOBACCO NON-USER: CPT | Performed by: INTERNAL MEDICINE

## 2019-02-11 PROCEDURE — G8417 CALC BMI ABV UP PARAM F/U: HCPCS | Performed by: INTERNAL MEDICINE

## 2019-02-11 PROCEDURE — 3017F COLORECTAL CA SCREEN DOC REV: CPT | Performed by: INTERNAL MEDICINE

## 2019-02-11 PROCEDURE — G8482 FLU IMMUNIZE ORDER/ADMIN: HCPCS | Performed by: INTERNAL MEDICINE

## 2019-02-11 PROCEDURE — 83036 HEMOGLOBIN GLYCOSYLATED A1C: CPT | Performed by: INTERNAL MEDICINE

## 2019-02-11 RX ORDER — PREDNISONE 10 MG/1
TABLET ORAL
Qty: 32 TABLET | Refills: 0 | Status: SHIPPED | OUTPATIENT
Start: 2019-02-11 | End: 2019-03-18 | Stop reason: ALTCHOICE

## 2019-02-11 ASSESSMENT — PATIENT HEALTH QUESTIONNAIRE - PHQ9
1. LITTLE INTEREST OR PLEASURE IN DOING THINGS: 0
SUM OF ALL RESPONSES TO PHQ QUESTIONS 1-9: 0
SUM OF ALL RESPONSES TO PHQ9 QUESTIONS 1 & 2: 0
SUM OF ALL RESPONSES TO PHQ QUESTIONS 1-9: 0
2. FEELING DOWN, DEPRESSED OR HOPELESS: 0

## 2019-02-11 ASSESSMENT — ENCOUNTER SYMPTOMS: BACK PAIN: 1

## 2019-02-12 DIAGNOSIS — I10 ESSENTIAL HYPERTENSION: ICD-10-CM

## 2019-02-12 DIAGNOSIS — E03.8 OTHER SPECIFIED HYPOTHYROIDISM: ICD-10-CM

## 2019-02-12 DIAGNOSIS — E78.00 PURE HYPERCHOLESTEROLEMIA: ICD-10-CM

## 2019-02-12 LAB
A/G RATIO: 1.6 (ref 1.1–2.2)
ALBUMIN SERPL-MCNC: 3.9 G/DL (ref 3.4–5)
ALP BLD-CCNC: 70 U/L (ref 40–129)
ALT SERPL-CCNC: 18 U/L (ref 10–40)
ANION GAP SERPL CALCULATED.3IONS-SCNC: 16 MMOL/L (ref 3–16)
AST SERPL-CCNC: 17 U/L (ref 15–37)
BILIRUB SERPL-MCNC: 0.6 MG/DL (ref 0–1)
BUN BLDV-MCNC: 23 MG/DL (ref 7–20)
CALCIUM SERPL-MCNC: 8.9 MG/DL (ref 8.3–10.6)
CHLORIDE BLD-SCNC: 102 MMOL/L (ref 99–110)
CHOLESTEROL, TOTAL: 137 MG/DL (ref 0–199)
CO2: 23 MMOL/L (ref 21–32)
CREAT SERPL-MCNC: 1 MG/DL (ref 0.8–1.3)
GFR AFRICAN AMERICAN: >60
GFR NON-AFRICAN AMERICAN: >60
GLOBULIN: 2.4 G/DL
GLUCOSE BLD-MCNC: 125 MG/DL (ref 70–99)
HDLC SERPL-MCNC: 53 MG/DL (ref 40–60)
LDL CHOLESTEROL CALCULATED: 57 MG/DL
POTASSIUM SERPL-SCNC: 4.2 MMOL/L (ref 3.5–5.1)
SODIUM BLD-SCNC: 141 MMOL/L (ref 136–145)
T4 FREE: 1.2 NG/DL (ref 0.9–1.8)
TOTAL PROTEIN: 6.3 G/DL (ref 6.4–8.2)
TRIGL SERPL-MCNC: 134 MG/DL (ref 0–150)
TSH REFLEX: 4.91 UIU/ML (ref 0.27–4.2)
VLDLC SERPL CALC-MCNC: 27 MG/DL

## 2019-02-14 DIAGNOSIS — E03.8 OTHER SPECIFIED HYPOTHYROIDISM: Primary | ICD-10-CM

## 2019-02-14 RX ORDER — LEVOTHYROXINE SODIUM 137 UG/1
137 TABLET ORAL DAILY
Qty: 30 TABLET | Refills: 3 | Status: SHIPPED | OUTPATIENT
Start: 2019-02-14 | End: 2019-06-22 | Stop reason: SDUPTHER

## 2019-02-15 ENCOUNTER — HOSPITAL ENCOUNTER (OUTPATIENT)
Dept: MRI IMAGING | Age: 69
Discharge: HOME | End: 2019-02-15
Payer: MEDICARE

## 2019-02-15 DIAGNOSIS — L98.9 ARM SKIN LESION, LEFT: ICD-10-CM

## 2019-02-15 DIAGNOSIS — E66.01 MORBID OBESITY WITH BMI OF 40.0-44.9, ADULT (HCC): Primary | ICD-10-CM

## 2019-02-15 PROCEDURE — 73218 MRI UPPER EXTREMITY W/O DYE: CPT

## 2019-02-18 DIAGNOSIS — E03.8 OTHER SPECIFIED HYPOTHYROIDISM: ICD-10-CM

## 2019-02-18 DIAGNOSIS — E66.01 MORBID OBESITY WITH BMI OF 40.0-44.9, ADULT (HCC): ICD-10-CM

## 2019-02-18 LAB
BASOPHILS ABSOLUTE: 0 K/UL (ref 0–0.2)
BASOPHILS RELATIVE PERCENT: 0.3 %
EOSINOPHILS ABSOLUTE: 0.3 K/UL (ref 0–0.6)
EOSINOPHILS RELATIVE PERCENT: 3.5 %
HCT VFR BLD CALC: 43.3 % (ref 40.5–52.5)
HEMOGLOBIN: 14.9 G/DL (ref 13.5–17.5)
LYMPHOCYTES ABSOLUTE: 3.7 K/UL (ref 1–5.1)
LYMPHOCYTES RELATIVE PERCENT: 40.6 %
MCH RBC QN AUTO: 33 PG (ref 26–34)
MCHC RBC AUTO-ENTMCNC: 34.4 G/DL (ref 31–36)
MCV RBC AUTO: 95.8 FL (ref 80–100)
MONOCYTES ABSOLUTE: 0.8 K/UL (ref 0–1.3)
MONOCYTES RELATIVE PERCENT: 8.4 %
NEUTROPHILS ABSOLUTE: 4.3 K/UL (ref 1.7–7.7)
NEUTROPHILS RELATIVE PERCENT: 47.2 %
PDW BLD-RTO: 14 % (ref 12.4–15.4)
PLATELET # BLD: 196 K/UL (ref 135–450)
PMV BLD AUTO: 9.5 FL (ref 5–10.5)
RBC # BLD: 4.52 M/UL (ref 4.2–5.9)
TSH SERPL DL<=0.05 MIU/L-ACNC: 4.28 UIU/ML (ref 0.27–4.2)
WBC # BLD: 9 K/UL (ref 4–11)

## 2019-03-14 ENCOUNTER — OFFICE VISIT (OUTPATIENT)
Dept: ORTHOPEDIC SURGERY | Age: 69
End: 2019-03-14
Payer: MEDICARE

## 2019-03-14 ENCOUNTER — TELEPHONE (OUTPATIENT)
Dept: ORTHOPEDIC SURGERY | Age: 69
End: 2019-03-14

## 2019-03-14 VITALS
BODY MASS INDEX: 39.39 KG/M2 | HEIGHT: 68 IN | WEIGHT: 259.92 LBS | SYSTOLIC BLOOD PRESSURE: 148 MMHG | HEART RATE: 90 BPM | DIASTOLIC BLOOD PRESSURE: 86 MMHG

## 2019-03-14 DIAGNOSIS — M96.1 LUMBAR POST-LAMINECTOMY SYNDROME: Primary | ICD-10-CM

## 2019-03-14 DIAGNOSIS — M54.16 LUMBAR RADICULOPATHY: ICD-10-CM

## 2019-03-14 DIAGNOSIS — G89.4 CHRONIC PAIN SYNDROME: ICD-10-CM

## 2019-03-14 PROCEDURE — G8427 DOCREV CUR MEDS BY ELIG CLIN: HCPCS | Performed by: PHYSICAL MEDICINE & REHABILITATION

## 2019-03-14 PROCEDURE — 3017F COLORECTAL CA SCREEN DOC REV: CPT | Performed by: PHYSICAL MEDICINE & REHABILITATION

## 2019-03-14 PROCEDURE — G8417 CALC BMI ABV UP PARAM F/U: HCPCS | Performed by: PHYSICAL MEDICINE & REHABILITATION

## 2019-03-14 PROCEDURE — G8482 FLU IMMUNIZE ORDER/ADMIN: HCPCS | Performed by: PHYSICAL MEDICINE & REHABILITATION

## 2019-03-14 PROCEDURE — 1036F TOBACCO NON-USER: CPT | Performed by: PHYSICAL MEDICINE & REHABILITATION

## 2019-03-14 PROCEDURE — 1123F ACP DISCUSS/DSCN MKR DOCD: CPT | Performed by: PHYSICAL MEDICINE & REHABILITATION

## 2019-03-14 PROCEDURE — 1101F PT FALLS ASSESS-DOCD LE1/YR: CPT | Performed by: PHYSICAL MEDICINE & REHABILITATION

## 2019-03-14 PROCEDURE — 99214 OFFICE O/P EST MOD 30 MIN: CPT | Performed by: PHYSICAL MEDICINE & REHABILITATION

## 2019-03-14 PROCEDURE — 4040F PNEUMOC VAC/ADMIN/RCVD: CPT | Performed by: PHYSICAL MEDICINE & REHABILITATION

## 2019-03-18 ENCOUNTER — OFFICE VISIT (OUTPATIENT)
Dept: FAMILY MEDICINE CLINIC | Age: 69
End: 2019-03-18
Payer: MEDICARE

## 2019-03-18 VITALS
WEIGHT: 257 LBS | SYSTOLIC BLOOD PRESSURE: 124 MMHG | OXYGEN SATURATION: 98 % | HEART RATE: 76 BPM | BODY MASS INDEX: 38.95 KG/M2 | RESPIRATION RATE: 14 BRPM | DIASTOLIC BLOOD PRESSURE: 84 MMHG | HEIGHT: 68 IN

## 2019-03-18 DIAGNOSIS — I10 ESSENTIAL HYPERTENSION: ICD-10-CM

## 2019-03-18 DIAGNOSIS — L98.9 LESION OF SUBCUTANEOUS TISSUE: ICD-10-CM

## 2019-03-18 DIAGNOSIS — E03.8 OTHER SPECIFIED HYPOTHYROIDISM: Primary | ICD-10-CM

## 2019-03-18 PROCEDURE — 3017F COLORECTAL CA SCREEN DOC REV: CPT | Performed by: INTERNAL MEDICINE

## 2019-03-18 PROCEDURE — 1036F TOBACCO NON-USER: CPT | Performed by: INTERNAL MEDICINE

## 2019-03-18 PROCEDURE — G8482 FLU IMMUNIZE ORDER/ADMIN: HCPCS | Performed by: INTERNAL MEDICINE

## 2019-03-18 PROCEDURE — 4040F PNEUMOC VAC/ADMIN/RCVD: CPT | Performed by: INTERNAL MEDICINE

## 2019-03-18 PROCEDURE — 1101F PT FALLS ASSESS-DOCD LE1/YR: CPT | Performed by: INTERNAL MEDICINE

## 2019-03-18 PROCEDURE — G8417 CALC BMI ABV UP PARAM F/U: HCPCS | Performed by: INTERNAL MEDICINE

## 2019-03-18 PROCEDURE — 1123F ACP DISCUSS/DSCN MKR DOCD: CPT | Performed by: INTERNAL MEDICINE

## 2019-03-18 PROCEDURE — G8427 DOCREV CUR MEDS BY ELIG CLIN: HCPCS | Performed by: INTERNAL MEDICINE

## 2019-03-18 PROCEDURE — 99213 OFFICE O/P EST LOW 20 MIN: CPT | Performed by: INTERNAL MEDICINE

## 2019-03-18 ASSESSMENT — ENCOUNTER SYMPTOMS
DIARRHEA: 0
SHORTNESS OF BREATH: 0
CONSTIPATION: 0
BACK PAIN: 1
WHEEZING: 0

## 2019-03-20 ENCOUNTER — HOSPITAL ENCOUNTER (OUTPATIENT)
Dept: MRI IMAGING | Age: 69
Discharge: HOME | End: 2019-03-20
Payer: MEDICARE

## 2019-03-20 DIAGNOSIS — G89.4 CHRONIC PAIN SYNDROME: ICD-10-CM

## 2019-03-20 PROCEDURE — 72146 MRI CHEST SPINE W/O DYE: CPT

## 2019-03-28 ENCOUNTER — TELEPHONE (OUTPATIENT)
Dept: ORTHOPEDIC SURGERY | Age: 69
End: 2019-03-28

## 2019-04-01 SDOH — HEALTH STABILITY: MENTAL HEALTH: HOW OFTEN DO YOU HAVE A DRINK CONTAINING ALCOHOL?: NEVER

## 2019-04-01 NOTE — PROGRESS NOTES
PATIENT REACHED   YES_X___NO____    PREOP INSTUCTIONS       DATE__4/8/19_______ TIME__0730_______ARRIVAL__0630______PLACE__[facility]__________  NOTHING TO EAT OR DRINK  6 HOURS PRIOR TO PROCEDURE START TIME  YOU NEED A RESPONSIBLE ADULT AGE 18 OR OLDER TO DRIVE YOU HOME  PLEASE BRING INSURANCE CARD. PICTURE ID AND COMPLETE LIST OF MEDS  WEAR LOOSE COMFORTABLE CLOTHING  FOLLOW ANY INSTRUCTIONS YOUR DRS OFFICE HAS GIVEN YOU,INCLUDING WHAT MEDICATIONS TO TAKE THE AM OF PROCEDURE AND WHEN AND IF YOU NEED TO STOP ANY BLOOD THINNERS. IF YOU HAVE QUESTIONS REGARDING THIS CALL THE OFFICE  THE GOAL BLOOD SUGAR THE AM OF PROCEDURE  OR LESS ABOVE THAT THE PROCEDURE MAY BE CANCELLED  ANY QUESTIONS CALL YOUR DOCTOR. ALSO,PLEASE READ THE INSTRUCTION PACKET FROM YOUR DR IF YOU RECEIVED ONE.   SPINE INTERVENTION NUMBER [phone]

## 2019-04-03 ENCOUNTER — INITIAL CONSULT (OUTPATIENT)
Dept: SURGERY | Age: 69
End: 2019-04-03
Payer: MEDICARE

## 2019-04-03 VITALS
HEART RATE: 73 BPM | HEIGHT: 68 IN | WEIGHT: 253 LBS | DIASTOLIC BLOOD PRESSURE: 91 MMHG | SYSTOLIC BLOOD PRESSURE: 153 MMHG | BODY MASS INDEX: 38.34 KG/M2

## 2019-04-03 DIAGNOSIS — D17.22 LIPOMA OF LEFT UPPER EXTREMITY: Primary | ICD-10-CM

## 2019-04-03 PROCEDURE — 99213 OFFICE O/P EST LOW 20 MIN: CPT | Performed by: SURGERY

## 2019-04-03 PROCEDURE — G8427 DOCREV CUR MEDS BY ELIG CLIN: HCPCS | Performed by: SURGERY

## 2019-04-03 PROCEDURE — G8417 CALC BMI ABV UP PARAM F/U: HCPCS | Performed by: SURGERY

## 2019-04-03 PROCEDURE — 3017F COLORECTAL CA SCREEN DOC REV: CPT | Performed by: SURGERY

## 2019-04-03 ASSESSMENT — ENCOUNTER SYMPTOMS: RESPIRATORY NEGATIVE: 1

## 2019-04-03 NOTE — PROGRESS NOTES
Vitamins-Minerals (MULTIVITAL) TABS Take 1 tablet by mouth daily. No current facility-administered medications for this visit. Prior to Admission medications    Medication Sig Start Date End Date Taking? Authorizing Provider   levothyroxine (SYNTHROID) 137 MCG tablet Take 1 tablet by mouth Daily 2/14/19  Yes Bret Douglas MD   vitamin B-12 (CYANOCOBALAMIN) 1000 MCG tablet Take 1,000 mcg by mouth daily   Yes Historical Provider, MD   zinc gluconate 50 MG tablet Take 50 mg by mouth daily   Yes Historical Provider, MD   sildenafil (REVATIO) 20 MG tablet Take  1-2 pills before relations. 2/27/18  Yes Bret Douglas MD   Multiple Vitamins-Minerals (MULTIVITAL) TABS Take 1 tablet by mouth daily.    Yes Historical Provider, MD         Allergies   Allergen Reactions    No Known Allergies        Social History     Socioeconomic History    Marital status:      Spouse name: Jessie Baldwin Number of children: 3    Years of education: Not on file    Highest education level: Not on file   Occupational History    Occupation: Retired--Construction    Social Needs    Financial resource strain: Not on file    Food insecurity:     Worry: Not on file     Inability: Not on file   [employer] [facility] needs:     Medical: Not on file     Non-medical: Not on file   Tobacco Use    Smoking status: Never Smoker    Smokeless tobacco: Never Used    Tobacco comment: counseled on tobacco exposure avoidance   Substance and Sexual Activity    Alcohol use: Not Currently     Alcohol/week: 0.0 oz     Frequency: Never    Drug use: No    Sexual activity: Yes     Partners: Female   Lifestyle    Physical activity:     Days per week: Not on file     Minutes per session: Not on file    Stress: Not on file   Relationships    Social connections:     Talks on phone: Not on file     Gets together: Not on file     Attends Yazidi service: Not on file     Active member of club or organization: Not on file     Attends meetings of extremity             Plan:      Likely benign lipoma given characteristic and previous history of lipomas  Offered excision vs close monitoring  He has decided to watch for now.   Return if desires excision or any change in size, symptoms        Ketty Santizo MD

## 2019-04-04 RX ORDER — CEPHALEXIN 500 MG/1
500 CAPSULE ORAL 3 TIMES DAILY
Qty: 15 CAPSULE | Refills: 0 | Status: SHIPPED | OUTPATIENT
Start: 2019-04-08 | End: 2019-04-13

## 2019-04-08 ENCOUNTER — APPOINTMENT (OUTPATIENT)
Dept: GENERAL RADIOLOGY | Age: 69
End: 2019-04-08
Attending: PHYSICAL MEDICINE & REHABILITATION
Payer: MEDICARE

## 2019-04-08 ENCOUNTER — HOSPITAL ENCOUNTER (OUTPATIENT)
Age: 69
Setting detail: OUTPATIENT SURGERY
Discharge: HOME | End: 2019-04-08
Attending: PHYSICAL MEDICINE & REHABILITATION | Admitting: PHYSICAL MEDICINE & REHABILITATION
Payer: MEDICARE

## 2019-04-08 VITALS
SYSTOLIC BLOOD PRESSURE: 132 MMHG | TEMPERATURE: 98.2 F | HEIGHT: 68 IN | HEART RATE: 62 BPM | WEIGHT: 245 LBS | OXYGEN SATURATION: 97 % | BODY MASS INDEX: 37.13 KG/M2 | DIASTOLIC BLOOD PRESSURE: 81 MMHG | RESPIRATION RATE: 16 BRPM

## 2019-04-08 LAB
APTT: 27.2 SEC (ref 26–36)
HCT VFR BLD CALC: 41 % (ref 40.5–52.5)
HEMOGLOBIN: 14.1 G/DL (ref 13.5–17.5)
INR BLD: 1.04 (ref 0.86–1.14)
MCH RBC QN AUTO: 32.4 PG (ref 26–34)
MCHC RBC AUTO-ENTMCNC: 34.3 G/DL (ref 31–36)
MCV RBC AUTO: 94.5 FL (ref 80–100)
PDW BLD-RTO: 13.3 % (ref 12.4–15.4)
PLATELET # BLD: 175 K/UL (ref 135–450)
PMV BLD AUTO: 9 FL (ref 5–10.5)
PROTHROMBIN TIME: 11.8 SEC (ref 9.8–13)
RBC # BLD: 4.34 M/UL (ref 4.2–5.9)
WBC # BLD: 4.7 K/UL (ref 4–11)

## 2019-04-08 PROCEDURE — 99152 MOD SED SAME PHYS/QHP 5/>YRS: CPT | Performed by: PHYSICAL MEDICINE & REHABILITATION

## 2019-04-08 PROCEDURE — 3610000058 HC PAIN LEVEL 5 BASE (NON-OR): Performed by: PHYSICAL MEDICINE & REHABILITATION

## 2019-04-08 PROCEDURE — 6360000002 HC RX W HCPCS: Performed by: PHYSICAL MEDICINE & REHABILITATION

## 2019-04-08 PROCEDURE — 2500000003 HC RX 250 WO HCPCS: Performed by: PHYSICAL MEDICINE & REHABILITATION

## 2019-04-08 PROCEDURE — 99153 MOD SED SAME PHYS/QHP EA: CPT | Performed by: PHYSICAL MEDICINE & REHABILITATION

## 2019-04-08 PROCEDURE — 2709999900 HC NON-CHARGEABLE SUPPLY: Performed by: PHYSICAL MEDICINE & REHABILITATION

## 2019-04-08 PROCEDURE — 3610000059 HC PAIN LEVEL 5 ADDL 15 MIN (NON-OR): Performed by: PHYSICAL MEDICINE & REHABILITATION

## 2019-04-08 PROCEDURE — 3209999900 FLUORO FOR SURGICAL PROCEDURES

## 2019-04-08 PROCEDURE — 85027 COMPLETE CBC AUTOMATED: CPT

## 2019-04-08 PROCEDURE — 85730 THROMBOPLASTIN TIME PARTIAL: CPT

## 2019-04-08 PROCEDURE — 72070 X-RAY EXAM THORAC SPINE 2VWS: CPT

## 2019-04-08 PROCEDURE — 36415 COLL VENOUS BLD VENIPUNCTURE: CPT

## 2019-04-08 PROCEDURE — C1778 LEAD, NEUROSTIMULATOR: HCPCS | Performed by: PHYSICAL MEDICINE & REHABILITATION

## 2019-04-08 PROCEDURE — 2580000003 HC RX 258: Performed by: PHYSICAL MEDICINE & REHABILITATION

## 2019-04-08 PROCEDURE — 85610 PROTHROMBIN TIME: CPT

## 2019-04-08 DEVICE — TRIAL LEAD KIT, 50CM WITH 5MM SPACING
Type: IMPLANTABLE DEVICE | Status: FUNCTIONAL
Brand: NEVRO®

## 2019-04-08 RX ORDER — MIDAZOLAM HYDROCHLORIDE 1 MG/ML
INJECTION INTRAMUSCULAR; INTRAVENOUS
Status: COMPLETED | OUTPATIENT
Start: 2019-04-08 | End: 2019-04-08

## 2019-04-08 RX ORDER — CEFAZOLIN SODIUM 2 G/100ML
2 INJECTION, SOLUTION INTRAVENOUS ONCE
Status: COMPLETED | OUTPATIENT
Start: 2019-04-08 | End: 2019-04-08

## 2019-04-08 RX ORDER — LIDOCAINE HYDROCHLORIDE 10 MG/ML
INJECTION, SOLUTION EPIDURAL; INFILTRATION; INTRACAUDAL; PERINEURAL
Status: COMPLETED | OUTPATIENT
Start: 2019-04-08 | End: 2019-04-08

## 2019-04-08 RX ORDER — FENTANYL CITRATE 50 UG/ML
INJECTION, SOLUTION INTRAMUSCULAR; INTRAVENOUS
Status: COMPLETED | OUTPATIENT
Start: 2019-04-08 | End: 2019-04-08

## 2019-04-08 RX ORDER — 0.9 % SODIUM CHLORIDE 0.9 %
VIAL (ML) INJECTION
Status: COMPLETED | OUTPATIENT
Start: 2019-04-08 | End: 2019-04-08

## 2019-04-08 RX ORDER — SODIUM CHLORIDE 9 MG/ML
INJECTION, SOLUTION INTRAVENOUS CONTINUOUS
Status: DISCONTINUED | OUTPATIENT
Start: 2019-04-08 | End: 2019-04-08 | Stop reason: HOSPADM

## 2019-04-08 RX ADMIN — SODIUM CHLORIDE: 9 INJECTION, SOLUTION INTRAVENOUS at 07:06

## 2019-04-08 RX ADMIN — CEFAZOLIN SODIUM 2 G: 2 INJECTION, SOLUTION INTRAVENOUS at 07:06

## 2019-04-08 ASSESSMENT — PAIN - FUNCTIONAL ASSESSMENT: PAIN_FUNCTIONAL_ASSESSMENT: 0-10

## 2019-04-08 ASSESSMENT — PAIN DESCRIPTION - DESCRIPTORS: DESCRIPTORS: ACHING

## 2019-04-08 ASSESSMENT — PAIN SCALES - GENERAL: PAINLEVEL_OUTOF10: 8

## 2019-04-08 NOTE — OP NOTE
Patient:  Benny Duff  YOB: 1950  Medical Record #:  [id number]   Place: [address] [place] [address]  Date:  4/8/2019   Physician:  Leonardo Oliver MD, YOBANI    Procedure: Spinal Cord Stimulator Percutaneous trial                      2 Lead placement was noted at top of the superior endplate at T8 and T9                     Access was obtained through the T12-L1 interspace     Pre-Procedure Diagnosis: Post Lumbar Laminectomy Syndrome,  Chronic pain syndrome, Lumbar radiculopathy      Post-Procedure Diagnosis: Same     Sedation: Local with 1% Lidocaine 9.5 ml and 2 mg of IV Versed and 50 mcg of IV Fentanyl     EBL: Minimal blood loss. Hemostasis was maintained by direct pressure to the external puncture site.  A sterile, dry pressure bandage was applied.     Complications: None     Procedure Summary:  The patient was seen in the office for complaints of low back and bilateral leg pain. He has failed PT, multiple lumbar ESIs, and lumbar surgery was not recommended.  Review of the imaging and physical exam of the patient confirmed the pre-procedure diagnosis.  After a thorough discussion of risks, benefits and alternatives informed consent was obtained. The patient was given IV antibiotics of Ancef slowly infused over 30 minutes prior to the procedure.  Patient was taken to the fluoroscopy suite and placed in the prone position with a pillow underneath his belly and one pillow underneath his neck.  Cardiopulmonary monitoring was established and the patient's vital signs were monitored throughout the procedure.      The skin overlying the lumbar spine was prepped with chloraprep x 3 and draped in the usual sterile fashion.  The patient was given 2 g of Ancef at the beginning of the procedure.  An AP fluoroscopic view was obtained to identify marked midline position of the spinous processes.  The skin was anesthetized with 1% lidocaine over the medial aspect of the L2 pedicle on the right side.  A stab incision was made by an 11 blade.  A 14 gauge needle was 1st utilized for entry into the epidural space at an approximate 45° angle needle was advanced using a paramedian approach into the T12-L1 interspace. Loss-of-resistance was utilized and epidural space was entered at the T12-L1 interspace. Lateral fluoroscopic view was obtained to confirm position of the Touhy needle.  The patient did not complain of any pain or paresthesia during needle placement.  Stylet was removed from the Touhy needle.  The spinal cord stimulator lead was advanced through the Touhy needle under direct visualization with pulsed Fluoroscopy.  The tip of the stimulator was aligned with the superior plate of T8.       The medial aspect of the left L2 pedicle was anesthetized with 1% lidocaine.  A 14-gauge 4 inch needle was advanced using loss-of-resistance to raymond technique into the epidural space at the T12-L1 interspace.  A 45° angle or less was utilized to advance the needle.  Loss-of-resistance was utilized and epidural space was entered at the T12-L1 interspace.  Lateral fluoroscopic view was utilized to confirm position of the needle.  The spinal cord stimulator lead was advanced through the Touhy needle under direct visualization medially and slightly to the right of midline. The tip of the lead was aligned with the superior endplate of T9.       At this point the stimlator leads were attached and testing was undertaken.  This was performed with the assistance of an SCS device representative from [facility].  The stylet was then removed from the lead followed by the Jamil Mullers was then anchored with Steri-Strips.  The temporary connector was attached.  The patient tolerated the procedure well and was escorted back to recovery area.         Patient was given Keflex 3 times a day to be discharged with.  An AP and lateral of the thoracic spine was also undertaken prior to discharge.  Patient was also given a pamphlet detailing additional precautions. Bob Barthel was asked to notify the pain clinic if there are any complications such as signs of infection, fevers, chills, increasing back, or neck pain.  The patient was also instructed to avoid getting the area wet until the trial leads are removed. Bob Barthel will follow up in 5 days for lead removal.        The patient was transferred to the post-operative area in stable condition.

## 2019-04-08 NOTE — H&P
HISTORY AND PHYSICAL/PRE-SEDATION ASSESSMENT    Patient:  Lisa Schaefer   :  1950  Medical Record No.:  [id number]   Date:  2019  Physician:  Mabel Solomon M.D. Facility: [address] [place] [address]    HISTORY OF PRESENT ILLNESS:                 The patient is a 78 y.o. male whom presents with low back pain with bilateral leg and feet pain. Review of the imaging and physical exam of the patient confirmed the pre-procedure diagnosis. After a thorough discussion of risks, benefits and alternatives informed consent was obtained. Past Medical History:   Past Medical History:   Diagnosis Date    Allergic rhinitis, seasonal     Bacterial meningitis     HX OF     Congenital absence of kidney     born with one kidney    Diverticulitis, colon     Gout     Gynecomastia     Hx of blood clots     postop knee replacement    Hydrocele, left     Lumbar disc disorder     Spermatocele     LEFT    Talipes cavus     Tinnitus     Wears contact lenses       Past Surgical History:     Past Surgical History:   Procedure Laterality Date    BREAST LUMPECTOMY Left 2014    lipoma    BREAST SURGERY Right 2/4/15    removal of lipoma right breast.    CHOLECYSTECTOMY      COLECTOMY      partial for diverticulitis    COLONOSCOPY      HYDROCELE EXCISION      and spermatacele excision    KNEE ARTHROSCOPY      left --dr Bonilla Rule  10/21/2013    BILATERAL DECOMPRESSIVE LUMBAR LAMINECTOMY L4-L5, POSSIBLE    TOTAL KNEE ARTHROPLASTY Left 13    [address]      Current Medications:   Prior to Admission medications    Medication Sig Start Date End Date Taking?  Authorizing Provider   cephALEXin (KEFLEX) 500 MG capsule Take 1 capsule by mouth 3 times daily for 5 days 19 Yes WINSTON Guadalupe   levothyroxine (SYNTHROID) 137 MCG tablet Take 1 tablet by mouth Daily 2/14/19  Yes Maureen Santamaria MD   vitamin B-12 (CYANOCOBALAMIN) 1000 MCG tablet Take 1,000 mcg by mouth daily   Yes Historical Provider, MD   Multiple Vitamins-Minerals (MULTIVITAL) TABS Take 1 tablet by mouth daily. Yes Historical Provider, MD   sildenafil (REVATIO) 20 MG tablet Take  1-2 pills before relations. 2/27/18   Maureen Santamaria MD     Allergies:  No known allergies  Social History:    reports that he has never smoked. He has never used smokeless tobacco. He reports that he drank alcohol. He reports that he does not use drugs. Family History:   Family History   Problem Relation Age of Onset    Cancer Mother 59        LUNG    Other Father 72        AORTIC ANEURYSM    Cancer Sister         BREAST     Other Brother         HIV    Arthritis Other     Kidney Disease Other        Vitals: Blood pressure (!) 148/87, pulse 73, temperature 98.2 °F (36.8 °C), temperature source Temporal, resp. rate 18, height 5' 8\" (1.727 m), weight 245 lb (111.1 kg), SpO2 96 %. PHYSICAL EXAM:including affected areas  HENT: Airway patent and reviewed  Cardiovascular: Normal rate, regular rhythm, normal heart sounds. Pulmonary/Chest: No wheezes. No rhonchi. No rales. Abdominal: Soft. Bowel sounds are normal. No distension. Extremities: Moves all extremities equally  Cervical and Lumbar Spine: Painful range of motion, no midline tenderness       Diagnosis:Lumbar post-laminectomy syndrome; Chronic pain syndrome; Lumbar radiculopathy  M96.1   G89.4   M54.16    Plan: Proceed with planned procedure      ASA CLASS:         []   I. Normal, healthy adult           [x]   II.  Mild systemic disease            []   III. Severe systemic disease      Mallampati: Mallampati Class II - (soft palate, fauces & uvula are visible)      Sedation plan:   [x]  Local              []  Minimal                  []  General anesthesia    Patient's condition acceptable for planned procedure/sedation.    Post Procedure Plan   Return to same level of care   ______________________     The risks and benefits as well as alternatives to the procedure have been discussed with the patient and or family. The patient and or next of kin understands and agrees to proceed.     Vera Lilly M.D.

## 2019-04-08 NOTE — PROGRESS NOTES
IV discontinued, catheter intact, and dressing applied. Procedural dressing dry and intact. Bilateral lower extremities equal in strength. Discharge instructions reviewed with patient or responsible adult, signed and copy given. All home medications have been reviewed. All questions answered and patient or responsible adult verbalized understanding.   PAIN LEVEL AT DISCHARGE _____8

## 2019-04-11 ENCOUNTER — OFFICE VISIT (OUTPATIENT)
Dept: ORTHOPEDIC SURGERY | Age: 69
End: 2019-04-11

## 2019-04-11 DIAGNOSIS — M54.6 THORACIC BACK PAIN, UNSPECIFIED BACK PAIN LATERALITY, UNSPECIFIED CHRONICITY: Primary | ICD-10-CM

## 2019-04-11 PROCEDURE — 99024 POSTOP FOLLOW-UP VISIT: CPT | Performed by: PHYSICAL MEDICINE & REHABILITATION

## 2019-04-11 NOTE — PROGRESS NOTES
AP and lateral of the thoracic spine taken today in the office. One of the lead has migrated caudad with the upper part of the lead at the T10 level. The 2nd lead continues to be at the T8 level. His programming will be adjusted. We may hold off on lead pull until the following Monday.

## 2019-04-12 ENCOUNTER — OFFICE VISIT (OUTPATIENT)
Dept: ORTHOPEDIC SURGERY | Age: 69
End: 2019-04-12

## 2019-04-12 VITALS
DIASTOLIC BLOOD PRESSURE: 75 MMHG | SYSTOLIC BLOOD PRESSURE: 141 MMHG | WEIGHT: 244.93 LBS | HEART RATE: 75 BPM | HEIGHT: 68 IN | BODY MASS INDEX: 37.12 KG/M2

## 2019-04-12 DIAGNOSIS — M79.672 PAIN IN BOTH FEET: Primary | ICD-10-CM

## 2019-04-12 DIAGNOSIS — M96.1 LUMBAR POST-LAMINECTOMY SYNDROME: ICD-10-CM

## 2019-04-12 DIAGNOSIS — M79.671 PAIN IN BOTH FEET: Primary | ICD-10-CM

## 2019-04-12 PROCEDURE — 3017F COLORECTAL CA SCREEN DOC REV: CPT | Performed by: PHYSICAL MEDICINE & REHABILITATION

## 2019-04-12 PROCEDURE — 1036F TOBACCO NON-USER: CPT | Performed by: PHYSICAL MEDICINE & REHABILITATION

## 2019-04-12 PROCEDURE — G8417 CALC BMI ABV UP PARAM F/U: HCPCS | Performed by: PHYSICAL MEDICINE & REHABILITATION

## 2019-04-12 PROCEDURE — 4040F PNEUMOC VAC/ADMIN/RCVD: CPT | Performed by: PHYSICAL MEDICINE & REHABILITATION

## 2019-04-12 PROCEDURE — G8427 DOCREV CUR MEDS BY ELIG CLIN: HCPCS | Performed by: PHYSICAL MEDICINE & REHABILITATION

## 2019-04-12 PROCEDURE — 99024 POSTOP FOLLOW-UP VISIT: CPT | Performed by: PHYSICAL MEDICINE & REHABILITATION

## 2019-04-12 PROCEDURE — 1123F ACP DISCUSS/DSCN MKR DOCD: CPT | Performed by: PHYSICAL MEDICINE & REHABILITATION

## 2019-04-15 ENCOUNTER — OFFICE VISIT (OUTPATIENT)
Dept: ORTHOPEDIC SURGERY | Age: 69
End: 2019-04-15
Payer: MEDICARE

## 2019-04-15 VITALS
SYSTOLIC BLOOD PRESSURE: 120 MMHG | DIASTOLIC BLOOD PRESSURE: 74 MMHG | HEIGHT: 68 IN | HEART RATE: 74 BPM | WEIGHT: 244.93 LBS | BODY MASS INDEX: 37.12 KG/M2

## 2019-04-15 DIAGNOSIS — M79.671 RIGHT FOOT PAIN: Primary | ICD-10-CM

## 2019-04-15 DIAGNOSIS — M79.2 CHRONIC PERIPHERAL NEUROPATHIC PAIN: ICD-10-CM

## 2019-04-15 DIAGNOSIS — G89.29 CHRONIC PERIPHERAL NEUROPATHIC PAIN: ICD-10-CM

## 2019-04-15 DIAGNOSIS — M77.51 CAPSULITIS OF METATARSOPHALANGEAL (MTP) JOINT OF RIGHT FOOT: ICD-10-CM

## 2019-04-15 PROCEDURE — G8427 DOCREV CUR MEDS BY ELIG CLIN: HCPCS | Performed by: PODIATRIST

## 2019-04-15 PROCEDURE — 1123F ACP DISCUSS/DSCN MKR DOCD: CPT | Performed by: PODIATRIST

## 2019-04-15 PROCEDURE — 1036F TOBACCO NON-USER: CPT | Performed by: PODIATRIST

## 2019-04-15 PROCEDURE — G8417 CALC BMI ABV UP PARAM F/U: HCPCS | Performed by: PODIATRIST

## 2019-04-15 PROCEDURE — L3260 AMBULATORY SURGICAL BOOT EAC: HCPCS | Performed by: PODIATRIST

## 2019-04-15 PROCEDURE — 99203 OFFICE O/P NEW LOW 30 MIN: CPT | Performed by: PODIATRIST

## 2019-04-15 PROCEDURE — 3017F COLORECTAL CA SCREEN DOC REV: CPT | Performed by: PODIATRIST

## 2019-04-15 PROCEDURE — 4040F PNEUMOC VAC/ADMIN/RCVD: CPT | Performed by: PODIATRIST

## 2019-04-15 RX ORDER — GABAPENTIN 300 MG/1
300 CAPSULE ORAL NIGHTLY
Qty: 7 CAPSULE | Refills: 0 | Status: SHIPPED | OUTPATIENT
Start: 2019-04-15 | End: 2019-07-23

## 2019-04-15 NOTE — PROGRESS NOTES
HISTORY OF PRESENT ILLNESS: This is an initial visit for a 69-year-old male with a chief complaint of right forefoot pain. He's had this problem on and off for the past several years. No history of  trauma is related. The pain is primarily on the bottom of the forefoot. The pain is worsened with weightbearing and relieved with rest.    He also has a complaint of constant burning pain to both feet. This is worse at night. He had lower back surgery a few years ago and ever since that he's had these symptoms. Apparently a pain stimulator was tried but that has not helped. At one point he tried Neurontin but she felt that it didn't help. FAMILY HISTORY:  Documented in chart. SOCIAL HISTORY:  Documented in chart. REVIEW OF SYSTEMS: Unremarkable for psychiatric, dermatologic, neurologic,  cardiovascular, hematologic, gastrointestinal, genitourinary, and pulmonary  problems. Family History, Social History, and Review of Systems were reviewed from patient history form dated on 4/15/2019 and available in the patient's chart under the MEDIA tab. PHYSICAL EXAM: The area of greatest palpable tenderness is at the plantar aspect  of the right 2nd MTP. He has a flexible higher arch foot type. There is mild edema in the area without erythema or ecchymosis. There is mild pain with range of motion to the joint, especially at end range of dorsiflexion. There is no deviation of the digit in the transverse plane with range of motion, nor is there a static deformity. Palpable pedal pulses are present bilateral. The sensation is diminished bilateral.  There are no paresthesias elicited at the tarsal tunnel bilateral.      RADIOGRAPHS: Three weightbearing views of the right foot were obtained. The 2nd and 3rd MTPs are in a congruent position. There are no acute fractures or periosteal reactions noted. ASSESSMENT: 2nd MTP capsulitis and pes cavus, right foot.   Painful chronic peripheral neuropathy, bilateral.      PLAN: The patient was educated on the pathology and its treatment options. A temporary arch support and a post-op shoe was applied to the right foot. Weightbearing will be as tolerated. All activities are to be performed with this combination on the foot. Overall activity is to be decreased. The patient is to rest, ice, and elevate to relieve pain and/or swelling. As for the neuropathic pain, I prescribed him gabapentin 300 mg to be taken 1 capsule p.o. q.h.s. for a week as a trial.  He will let me know if this helps. The patient is to return in 3 weeks for reevaluation. Procedures    Darco Post-op Shoe Brace     Patient was prescribed a Darco Post-Op Shoe. The right foot will require stabilization / immobilization from this semi-rigid / rigid orthosis to improve their function. The orthosis will assist in protecting the affected area, provide functional support and facilitate healing. Patient was instructed to progress ambulation weight bearing as tolerated in the device. The patient was educated and fit by a healthcare professional with expert knowledge and specialization in brace application while under the direct supervision of the treating physician. Verbal and written instructions for the use of and application of this item were provided. They were instructed to contact the office immediately should the brace result in increased pain, decreased sensation, increased swelling or worsening of the condition.

## 2019-04-16 DIAGNOSIS — E03.8 OTHER SPECIFIED HYPOTHYROIDISM: ICD-10-CM

## 2019-04-16 LAB — TSH SERPL DL<=0.05 MIU/L-ACNC: 2.49 UIU/ML (ref 0.27–4.2)

## 2019-04-22 ENCOUNTER — OFFICE VISIT (OUTPATIENT)
Dept: FAMILY MEDICINE CLINIC | Age: 69
End: 2019-04-22
Payer: MEDICARE

## 2019-04-22 VITALS
DIASTOLIC BLOOD PRESSURE: 72 MMHG | BODY MASS INDEX: 39.4 KG/M2 | WEIGHT: 260 LBS | RESPIRATION RATE: 14 BRPM | SYSTOLIC BLOOD PRESSURE: 128 MMHG | HEIGHT: 68 IN | OXYGEN SATURATION: 97 % | HEART RATE: 80 BPM

## 2019-04-22 DIAGNOSIS — G57.93 NEUROPATHIC PAIN OF BOTH LEGS: ICD-10-CM

## 2019-04-22 DIAGNOSIS — L98.9 LESION OF SUBCUTANEOUS TISSUE: ICD-10-CM

## 2019-04-22 DIAGNOSIS — E66.01 MORBID OBESITY WITH BMI OF 40.0-44.9, ADULT (HCC): ICD-10-CM

## 2019-04-22 DIAGNOSIS — I10 ESSENTIAL HYPERTENSION: Primary | ICD-10-CM

## 2019-04-22 DIAGNOSIS — E03.8 OTHER SPECIFIED HYPOTHYROIDISM: ICD-10-CM

## 2019-04-22 PROCEDURE — 3017F COLORECTAL CA SCREEN DOC REV: CPT | Performed by: INTERNAL MEDICINE

## 2019-04-22 PROCEDURE — 1123F ACP DISCUSS/DSCN MKR DOCD: CPT | Performed by: INTERNAL MEDICINE

## 2019-04-22 PROCEDURE — G8427 DOCREV CUR MEDS BY ELIG CLIN: HCPCS | Performed by: INTERNAL MEDICINE

## 2019-04-22 PROCEDURE — 99214 OFFICE O/P EST MOD 30 MIN: CPT | Performed by: INTERNAL MEDICINE

## 2019-04-22 PROCEDURE — 1036F TOBACCO NON-USER: CPT | Performed by: INTERNAL MEDICINE

## 2019-04-22 PROCEDURE — 4040F PNEUMOC VAC/ADMIN/RCVD: CPT | Performed by: INTERNAL MEDICINE

## 2019-04-22 PROCEDURE — G8417 CALC BMI ABV UP PARAM F/U: HCPCS | Performed by: INTERNAL MEDICINE

## 2019-04-22 RX ORDER — GABAPENTIN 300 MG/1
CAPSULE ORAL
Qty: 60 CAPSULE | Refills: 0 | Status: SHIPPED | OUTPATIENT
Start: 2019-04-24 | End: 2019-04-22 | Stop reason: SDUPTHER

## 2019-04-22 RX ORDER — GABAPENTIN 300 MG/1
CAPSULE ORAL
Qty: 60 CAPSULE | Refills: 0 | Status: SHIPPED | OUTPATIENT
Start: 2019-04-24 | End: 2019-06-12 | Stop reason: SDUPTHER

## 2019-04-22 ASSESSMENT — ENCOUNTER SYMPTOMS
WHEEZING: 0
CONSTIPATION: 0
DIARRHEA: 0
SHORTNESS OF BREATH: 0

## 2019-04-22 NOTE — PROGRESS NOTES
4/22/2019    This is a 78 y.o. male   Chief Complaint   Patient presents with    Hypertension    Hypothyroidism   . Feels fine ,but back is \"killing me\"    Nothing helps. Hurts all the time. Tried some stimulation therapy with dr Saul Holbrook. Restarted gabapentin , in the past caused some ED. Not sure if it is helping  Dr. Carmita Blankenship  started him on the gabapentin again. Dr. Carmita Blankenship only gave him  7 pills  Was given a boot to wear and has an inflamed tendon,  Was given a boot but it is hard to wear. Stopped hcg about  3 weeks ago  Quit losing wt on it      HPI    Review of Systems   Constitutional: Negative for appetite change and unexpected weight change. Respiratory: Negative for shortness of breath and wheezing. Gastrointestinal: Negative for constipation and diarrhea. Neurological: Negative for dizziness and light-headedness. Past Medical History:   Diagnosis Date    Allergic rhinitis, seasonal     Bacterial meningitis     HX OF     Congenital absence of kidney     born with one kidney    Diverticulitis, colon     Gout     Gynecomastia     Hx of blood clots     postop knee replacement    Hydrocele, left     Lumbar disc disorder     Spermatocele     LEFT    Talipes cavus     Tinnitus     Wears contact lenses        Prior to Visit Medications    Medication Sig Taking? Authorizing Provider   vitamin D-3 (CHOLECALCIFEROL) 5000 units TABS Take 1 tablet by mouth Yes Historical Provider, MD   gabapentin (NEURONTIN) 300 MG capsule Take 1 capsule by mouth nightly for 7 days. Yes Marlea , DPM   levothyroxine (SYNTHROID) 137 MCG tablet Take 1 tablet by mouth Daily Yes Masoud Bolton MD   vitamin B-12 (CYANOCOBALAMIN) 1000 MCG tablet Take 1,000 mcg by mouth daily Yes Historical Provider, MD   sildenafil (REVATIO) 20 MG tablet Take  1-2 pills before relations. Yes Masoud Bolton MD   Multiple Vitamins-Minerals (MULTIVITAL) TABS Take 1 tablet by mouth daily.  Yes Historical Provider, MD Relationship status: Not on file    Intimate partner violence:     Fear of current or ex partner: Not on file     Emotionally abused: Not on file     Physically abused: Not on file     Forced sexual activity: Not on file   Other Topics Concern    Not on file   Social History Narrative    Not on file       Family History   Problem Relation Age of Onset    Cancer Mother 59        LUNG    Other Father 72        AORTIC ANEURYSM    Cancer Sister         BREAST     Other Brother         HIV    Arthritis Other     Kidney Disease Other        Current Outpatient Medications   Medication Sig Dispense Refill    vitamin D-3 (CHOLECALCIFEROL) 5000 units TABS Take 1 tablet by mouth      gabapentin (NEURONTIN) 300 MG capsule Take 1 capsule by mouth nightly for 7 days. 7 capsule 0    levothyroxine (SYNTHROID) 137 MCG tablet Take 1 tablet by mouth Daily 30 tablet 3    vitamin B-12 (CYANOCOBALAMIN) 1000 MCG tablet Take 1,000 mcg by mouth daily      sildenafil (REVATIO) 20 MG tablet Take  1-2 pills before relations. 30 tablet 1    Multiple Vitamins-Minerals (MULTIVITAL) TABS Take 1 tablet by mouth daily. No current facility-administered medications for this visit. Allergies   Allergen Reactions    No Known Allergies        /72 (Site: Right Upper Arm, Position: Sitting, Cuff Size: Large Adult)   Pulse 80   Resp 14   Ht 5' 8\" (1.727 m)   Wt 260 lb (117.9 kg)   SpO2 97%   BMI 39.53 kg/m²     Physical Exam   Constitutional: He appears well-developed and well-nourished. HENT:   Head: Normocephalic and atraumatic. Cardiovascular: Normal rate and regular rhythm. No murmur heard. Pulmonary/Chest: Effort normal and breath sounds normal. He has no wheezes. Abdominal: There is no tenderness. Neurological: He is alert. Skin: Skin is warm and dry. Psychiatric: He has a normal mood and affect.  His behavior is normal. Judgment and thought content normal.       Wt Readings from Last 3 Encounters:   04/22/19 260 lb (117.9 kg)   04/15/19 244 lb 14.9 oz (111.1 kg)   04/12/19 244 lb 14.9 oz (111.1 kg)       BP Readings from Last 3 Encounters:   04/22/19 128/72   04/15/19 120/74   04/12/19 (!) 141/75         Plan  Sharonda Piedra was seen today for hypertension and hypothyroidism. Diagnoses and all orders for this visit:    Essential hypertension  bp is ok    Other specified hypothyroidism  No change in thyroid med  Dose. Neuropathic pain of both legs (HCC)--seeing dr Jade Miller for w/u 7/14--off neurontin (pt does not want to take meds)-- pain in feet only at night now    Other orders  -     gabapentin (NEURONTIN) 300 MG capsule; Take 2 pills every night  . Intended supply: 30 days  Will increase the dose of the gabapentin. Would like to take the med all at once. The 300mg does not make him too tired.     Fu in 3 months

## 2019-05-06 ENCOUNTER — TELEPHONE (OUTPATIENT)
Dept: FAMILY MEDICINE CLINIC | Age: 69
End: 2019-05-06

## 2019-05-07 RX ORDER — PREDNISONE 10 MG/1
TABLET ORAL
Qty: 32 TABLET | Refills: 0 | Status: SHIPPED | OUTPATIENT
Start: 2019-05-07 | End: 2019-07-23 | Stop reason: ALTCHOICE

## 2019-06-22 DIAGNOSIS — E03.8 OTHER SPECIFIED HYPOTHYROIDISM: ICD-10-CM

## 2019-06-24 DIAGNOSIS — E03.8 OTHER SPECIFIED HYPOTHYROIDISM: ICD-10-CM

## 2019-06-24 RX ORDER — LEVOTHYROXINE SODIUM 137 UG/1
TABLET ORAL
Qty: 90 TABLET | Refills: 2 | Status: SHIPPED | OUTPATIENT
Start: 2019-06-24 | End: 2019-06-24 | Stop reason: SDUPTHER

## 2019-06-24 RX ORDER — LEVOTHYROXINE SODIUM 137 UG/1
TABLET ORAL
Qty: 90 TABLET | Refills: 2 | Status: SHIPPED | OUTPATIENT
Start: 2019-06-24 | End: 2020-05-27

## 2019-07-23 ENCOUNTER — OFFICE VISIT (OUTPATIENT)
Dept: FAMILY MEDICINE CLINIC | Age: 69
End: 2019-07-23
Payer: MEDICARE

## 2019-07-23 VITALS
DIASTOLIC BLOOD PRESSURE: 90 MMHG | SYSTOLIC BLOOD PRESSURE: 140 MMHG | WEIGHT: 266 LBS | HEART RATE: 83 BPM | BODY MASS INDEX: 40.45 KG/M2 | RESPIRATION RATE: 12 BRPM | OXYGEN SATURATION: 96 %

## 2019-07-23 DIAGNOSIS — G89.29 CHRONIC RIGHT-SIDED LOW BACK PAIN WITH RIGHT-SIDED SCIATICA: Primary | ICD-10-CM

## 2019-07-23 DIAGNOSIS — M54.41 CHRONIC RIGHT-SIDED LOW BACK PAIN WITH RIGHT-SIDED SCIATICA: Primary | ICD-10-CM

## 2019-07-23 PROCEDURE — 3017F COLORECTAL CA SCREEN DOC REV: CPT | Performed by: INTERNAL MEDICINE

## 2019-07-23 PROCEDURE — 1123F ACP DISCUSS/DSCN MKR DOCD: CPT | Performed by: INTERNAL MEDICINE

## 2019-07-23 PROCEDURE — G8427 DOCREV CUR MEDS BY ELIG CLIN: HCPCS | Performed by: INTERNAL MEDICINE

## 2019-07-23 PROCEDURE — 4040F PNEUMOC VAC/ADMIN/RCVD: CPT | Performed by: INTERNAL MEDICINE

## 2019-07-23 PROCEDURE — G8417 CALC BMI ABV UP PARAM F/U: HCPCS | Performed by: INTERNAL MEDICINE

## 2019-07-23 PROCEDURE — 99214 OFFICE O/P EST MOD 30 MIN: CPT | Performed by: INTERNAL MEDICINE

## 2019-07-23 PROCEDURE — 1036F TOBACCO NON-USER: CPT | Performed by: INTERNAL MEDICINE

## 2019-07-23 ASSESSMENT — ENCOUNTER SYMPTOMS
CONSTIPATION: 0
DIARRHEA: 0
WHEEZING: 0
SHORTNESS OF BREATH: 0

## 2019-07-23 NOTE — PROGRESS NOTES
Lifestyle    Physical activity:     Days per week: Not on file     Minutes per session: Not on file    Stress: Not on file   Relationships    Social connections:     Talks on phone: Not on file     Gets together: Not on file     Attends [employer] service: Not on file     Active member of club or organization: Not on file     Attends meetings of clubs or organizations: Not on file     Relationship status: Not on file    Intimate partner violence:     Fear of current or ex partner: Not on file     Emotionally abused: Not on file     Physically abused: Not on file     Forced sexual activity: Not on file   Other Topics Concern    Not on file   Social History Narrative    Not on file        Family History   Problem Relation Age of Onset    Cancer Mother 59        LUNG    Other Father 72        AORTIC ANEURYSM    Cancer Sister         BREAST     Other Brother         HIV    Arthritis Other     Kidney Disease Other      Prior to Visit Medications    Medication Sig Taking? Authorizing Provider   levothyroxine (SYNTHROID) 137 MCG tablet TAKE ONE TABLET BY MOUTH DAILY Yes Eather Dakin, MD   sildenafil (REVATIO) 20 MG tablet TAKE 1 TO 2 PILLS BEFORE RELATIONS Yes Eather Dakin, MD   vitamin D-3 (CHOLECALCIFEROL) 5000 units TABS Take 1 tablet by mouth Yes Historical Provider, MD   vitamin B-12 (CYANOCOBALAMIN) 1000 MCG tablet Take 1,000 mcg by mouth daily Yes Historical Provider, MD   Multiple Vitamins-Minerals (MULTIVITAL) TABS Take 1 tablet by mouth daily.  Yes Historical Provider, MD     Patient Active Problem List   Diagnosis    Colonic polyps(LAST COLO 5/15--pos mult small polyps--REPEAT 5 YR)--dr lynn    Tinnitus,CHRONIC    Hydrocele, left    Spermatocele,LEFT    Allergic rhinitis, seasonal    Gynecomastia, male    Diverticulitis of colon    Baker's cyst of knee-left    Primary localized osteoarthrosis, lower leg    DVT, lower extremity--post op--5/13 (s/p knee)    Spinal stenosis-pos

## 2020-01-22 ENCOUNTER — OFFICE VISIT (OUTPATIENT)
Dept: FAMILY MEDICINE CLINIC | Age: 70
End: 2020-01-22
Payer: MEDICARE

## 2020-01-22 VITALS
BODY MASS INDEX: 43.35 KG/M2 | WEIGHT: 286 LBS | SYSTOLIC BLOOD PRESSURE: 148 MMHG | RESPIRATION RATE: 16 BRPM | HEART RATE: 97 BPM | HEIGHT: 68 IN | OXYGEN SATURATION: 95 % | DIASTOLIC BLOOD PRESSURE: 88 MMHG

## 2020-01-22 PROCEDURE — 1036F TOBACCO NON-USER: CPT | Performed by: INTERNAL MEDICINE

## 2020-01-22 PROCEDURE — 1123F ACP DISCUSS/DSCN MKR DOCD: CPT | Performed by: INTERNAL MEDICINE

## 2020-01-22 PROCEDURE — G0008 ADMIN INFLUENZA VIRUS VAC: HCPCS | Performed by: INTERNAL MEDICINE

## 2020-01-22 PROCEDURE — 90653 IIV ADJUVANT VACCINE IM: CPT | Performed by: INTERNAL MEDICINE

## 2020-01-22 PROCEDURE — 99213 OFFICE O/P EST LOW 20 MIN: CPT | Performed by: INTERNAL MEDICINE

## 2020-01-22 PROCEDURE — G8482 FLU IMMUNIZE ORDER/ADMIN: HCPCS | Performed by: INTERNAL MEDICINE

## 2020-01-22 PROCEDURE — G8427 DOCREV CUR MEDS BY ELIG CLIN: HCPCS | Performed by: INTERNAL MEDICINE

## 2020-01-22 PROCEDURE — 3017F COLORECTAL CA SCREEN DOC REV: CPT | Performed by: INTERNAL MEDICINE

## 2020-01-22 PROCEDURE — 4040F PNEUMOC VAC/ADMIN/RCVD: CPT | Performed by: INTERNAL MEDICINE

## 2020-01-22 PROCEDURE — G8417 CALC BMI ABV UP PARAM F/U: HCPCS | Performed by: INTERNAL MEDICINE

## 2020-01-22 RX ORDER — AMLODIPINE BESYLATE 5 MG/1
5 TABLET ORAL DAILY
Qty: 90 TABLET | Refills: 0 | Status: SHIPPED | OUTPATIENT
Start: 2020-01-22 | End: 2020-02-24

## 2020-01-22 RX ORDER — LISINOPRIL 10 MG/1
10 TABLET ORAL DAILY
Qty: 30 TABLET | Refills: 0 | Status: SHIPPED | OUTPATIENT
Start: 2020-01-22 | End: 2020-02-24

## 2020-01-22 ASSESSMENT — ENCOUNTER SYMPTOMS
SHORTNESS OF BREATH: 0
NAUSEA: 0
VOMITING: 0
WHEEZING: 0
BACK PAIN: 1

## 2020-01-22 ASSESSMENT — PATIENT HEALTH QUESTIONNAIRE - PHQ9
SUM OF ALL RESPONSES TO PHQ QUESTIONS 1-9: 0
2. FEELING DOWN, DEPRESSED OR HOPELESS: 0
SUM OF ALL RESPONSES TO PHQ QUESTIONS 1-9: 0
1. LITTLE INTEREST OR PLEASURE IN DOING THINGS: 0
SUM OF ALL RESPONSES TO PHQ9 QUESTIONS 1 & 2: 0

## 2020-01-22 NOTE — PROGRESS NOTES
Current Outpatient Medications   Medication Sig Dispense Refill    levothyroxine (SYNTHROID) 137 MCG tablet TAKE ONE TABLET BY MOUTH DAILY 90 tablet 2    Multiple Vitamins-Minerals (MULTIVITAL) TABS Take 1 tablet by mouth daily. No current facility-administered medications for this visit. Allergies   Allergen Reactions    No Known Allergies        BP (!) 148/88   Pulse 97   Resp 16   Ht 5' 8\" (1.727 m)   Wt 286 lb (129.7 kg)   SpO2 95%   BMI 43.49 kg/m²     Physical Exam  Constitutional:       Appearance: He is well-developed. He is obese. HENT:      Head: Normocephalic and atraumatic. Cardiovascular:      Rate and Rhythm: Normal rate and regular rhythm. Heart sounds: No murmur. Pulmonary:      Effort: Pulmonary effort is normal.      Breath sounds: Normal breath sounds. No wheezing. Abdominal:      Palpations: Abdomen is soft. Tenderness: There is no tenderness. Skin:     General: Skin is warm and dry. Neurological:      Mental Status: He is alert. Psychiatric:         Mood and Affect: Mood normal.         Behavior: Behavior normal.         Thought Content: Thought content normal.         Judgment: Judgment normal.         Wt Readings from Last 3 Encounters:   01/22/20 286 lb (129.7 kg)   07/23/19 266 lb (120.7 kg)   04/22/19 260 lb (117.9 kg)       BP Readings from Last 3 Encounters:   01/22/20 (!) 148/88   07/23/19 (!) 140/90   04/22/19 128/72         Garrett Soto was seen today for hypertension. Diagnoses and all orders for this visit:    Essential hypertension  -     Comprehensive Metabolic Panel; Future    Morbid obesity with BMI of 40.0-44.9, adult ([facility] [address]    Pure hypercholesterolemia  -     Lipid Panel; Future    Other specified hypothyroidism  -     TSH with Reflex; Future    Other orders  -     amLODIPine (NORVASC) 5 MG tablet; Take 1 tablet by mouth daily  -     lisinopril (PRINIVIL;ZESTRIL) 10 MG tablet;  Take 1 tablet by mouth daily    get labs in 7-10 days.    Start bp meds    Fu in one month

## 2020-01-25 ENCOUNTER — TELEPHONE (OUTPATIENT)
Dept: FAMILY MEDICINE CLINIC | Age: 70
End: 2020-01-25

## 2020-02-18 DIAGNOSIS — E03.8 OTHER SPECIFIED HYPOTHYROIDISM: ICD-10-CM

## 2020-02-18 DIAGNOSIS — E78.00 PURE HYPERCHOLESTEROLEMIA: ICD-10-CM

## 2020-02-18 DIAGNOSIS — I10 ESSENTIAL HYPERTENSION: ICD-10-CM

## 2020-02-18 LAB
A/G RATIO: 1.6 (ref 1.1–2.2)
ALBUMIN SERPL-MCNC: 4 G/DL (ref 3.4–5)
ALP BLD-CCNC: 63 U/L (ref 40–129)
ALT SERPL-CCNC: 19 U/L (ref 10–40)
ANION GAP SERPL CALCULATED.3IONS-SCNC: 13 MMOL/L (ref 3–16)
AST SERPL-CCNC: 17 U/L (ref 15–37)
BILIRUB SERPL-MCNC: 0.5 MG/DL (ref 0–1)
BUN BLDV-MCNC: 15 MG/DL (ref 7–20)
CALCIUM SERPL-MCNC: 9.2 MG/DL (ref 8.3–10.6)
CHLORIDE BLD-SCNC: 103 MMOL/L (ref 99–110)
CHOLESTEROL, TOTAL: 146 MG/DL (ref 0–199)
CO2: 26 MMOL/L (ref 21–32)
CREAT SERPL-MCNC: 1 MG/DL (ref 0.8–1.3)
GFR AFRICAN AMERICAN: >60
GFR NON-AFRICAN AMERICAN: >60
GLOBULIN: 2.5 G/DL
GLUCOSE BLD-MCNC: 122 MG/DL (ref 70–99)
HDLC SERPL-MCNC: 44 MG/DL (ref 40–60)
LDL CHOLESTEROL CALCULATED: 73 MG/DL
POTASSIUM SERPL-SCNC: 4.3 MMOL/L (ref 3.5–5.1)
SODIUM BLD-SCNC: 142 MMOL/L (ref 136–145)
TOTAL PROTEIN: 6.5 G/DL (ref 6.4–8.2)
TRIGL SERPL-MCNC: 145 MG/DL (ref 0–150)
TSH REFLEX: 1.72 UIU/ML (ref 0.27–4.2)
VLDLC SERPL CALC-MCNC: 29 MG/DL

## 2020-02-24 ENCOUNTER — OFFICE VISIT (OUTPATIENT)
Dept: FAMILY MEDICINE CLINIC | Age: 70
End: 2020-02-24
Payer: MEDICARE

## 2020-02-24 VITALS
OXYGEN SATURATION: 95 % | DIASTOLIC BLOOD PRESSURE: 68 MMHG | RESPIRATION RATE: 14 BRPM | SYSTOLIC BLOOD PRESSURE: 122 MMHG | HEIGHT: 68 IN | HEART RATE: 80 BPM | WEIGHT: 288.4 LBS | BODY MASS INDEX: 43.71 KG/M2

## 2020-02-24 PROCEDURE — 1036F TOBACCO NON-USER: CPT | Performed by: INTERNAL MEDICINE

## 2020-02-24 PROCEDURE — 1123F ACP DISCUSS/DSCN MKR DOCD: CPT | Performed by: INTERNAL MEDICINE

## 2020-02-24 PROCEDURE — 99213 OFFICE O/P EST LOW 20 MIN: CPT | Performed by: INTERNAL MEDICINE

## 2020-02-24 PROCEDURE — 4040F PNEUMOC VAC/ADMIN/RCVD: CPT | Performed by: INTERNAL MEDICINE

## 2020-02-24 PROCEDURE — G8427 DOCREV CUR MEDS BY ELIG CLIN: HCPCS | Performed by: INTERNAL MEDICINE

## 2020-02-24 PROCEDURE — 3017F COLORECTAL CA SCREEN DOC REV: CPT | Performed by: INTERNAL MEDICINE

## 2020-02-24 PROCEDURE — G8482 FLU IMMUNIZE ORDER/ADMIN: HCPCS | Performed by: INTERNAL MEDICINE

## 2020-02-24 PROCEDURE — G8417 CALC BMI ABV UP PARAM F/U: HCPCS | Performed by: INTERNAL MEDICINE

## 2020-02-24 RX ORDER — LISINOPRIL 10 MG/1
10 TABLET ORAL DAILY
Qty: 90 TABLET | Refills: 1 | Status: SHIPPED
Start: 2020-02-24 | End: 2020-04-27 | Stop reason: DRUGHIGH

## 2020-02-24 ASSESSMENT — ENCOUNTER SYMPTOMS
DIARRHEA: 1
SHORTNESS OF BREATH: 0
WHEEZING: 0
CONSTIPATION: 0

## 2020-02-24 NOTE — PROGRESS NOTES
2/24/2020    Chief Complaint   Patient presents with    Hypertension     pt thinks one of the bp meds is making him dizzy. HPI   htn  Head spins when he lies down   , consistent   Can make him sick   To the stomach. Last 5 seconds. 130/70's  With bp cuff    He started both norvasc and the lisionpril. Hypothyroid   tsh  Was perfect  Energy level is ok      Cholesterol was very good         Review of Systems   Constitutional: Negative for appetite change and unexpected weight change. Respiratory: Negative for shortness of breath and wheezing. Gastrointestinal: Positive for diarrhea (when he came back from [place]  , better now). Negative for constipation. Neurological: Positive for dizziness. Negative for light-headedness.        Health Maintenance   Topic Date Due    Shingles Vaccine (1 of 2) 02/20/1999    Annual Wellness Visit (AWV)  05/29/2019    TSH testing  02/18/2021    Potassium monitoring  02/18/2021    Creatinine monitoring  02/18/2021    DTaP/Tdap/Td vaccine (3 - Td) 10/27/2024    Lipid screen  02/18/2025    Colon cancer screen colonoscopy  05/19/2025    Flu vaccine  Completed    Pneumococcal 65+ years Vaccine  Completed    Hepatitis C screen  Completed    Hepatitis A vaccine  Aged Out    Hepatitis B vaccine  Aged Out    Hib vaccine  Aged Out    Meningococcal (ACWY) vaccine  Aged Out      Social History     Socioeconomic History    Marital status:      Spouse name: Shon Wolf Number of children: 3    Years of education: None    Highest education level: None   Occupational History    Occupation: Retired--Construction    Social Needs    Financial resource strain: None    Food insecurity:     Worry: None     Inability: None    Transportation needs:     Medical: None     Non-medical: None   Tobacco Use    Smoking status: Never Smoker    Smokeless tobacco: Never Used    Tobacco comment: counseled on tobacco exposure avoidance   Substance and Sexual Activity   

## 2020-03-02 ENCOUNTER — OFFICE VISIT (OUTPATIENT)
Dept: FAMILY MEDICINE CLINIC | Age: 70
End: 2020-03-02
Payer: MEDICARE

## 2020-03-02 ENCOUNTER — HOSPITAL ENCOUNTER (OUTPATIENT)
Dept: CT IMAGING | Age: 70
Discharge: HOME | End: 2020-03-02
Payer: MEDICARE

## 2020-03-02 VITALS
BODY MASS INDEX: 43.71 KG/M2 | DIASTOLIC BLOOD PRESSURE: 74 MMHG | WEIGHT: 288.4 LBS | SYSTOLIC BLOOD PRESSURE: 146 MMHG | OXYGEN SATURATION: 95 % | RESPIRATION RATE: 14 BRPM | HEIGHT: 68 IN | HEART RATE: 84 BPM

## 2020-03-02 PROCEDURE — 3017F COLORECTAL CA SCREEN DOC REV: CPT | Performed by: INTERNAL MEDICINE

## 2020-03-02 PROCEDURE — 4040F PNEUMOC VAC/ADMIN/RCVD: CPT | Performed by: INTERNAL MEDICINE

## 2020-03-02 PROCEDURE — 99214 OFFICE O/P EST MOD 30 MIN: CPT | Performed by: INTERNAL MEDICINE

## 2020-03-02 PROCEDURE — 1123F ACP DISCUSS/DSCN MKR DOCD: CPT | Performed by: INTERNAL MEDICINE

## 2020-03-02 PROCEDURE — 1036F TOBACCO NON-USER: CPT | Performed by: INTERNAL MEDICINE

## 2020-03-02 PROCEDURE — G8482 FLU IMMUNIZE ORDER/ADMIN: HCPCS | Performed by: INTERNAL MEDICINE

## 2020-03-02 PROCEDURE — 70450 CT HEAD/BRAIN W/O DYE: CPT

## 2020-03-02 PROCEDURE — G8417 CALC BMI ABV UP PARAM F/U: HCPCS | Performed by: INTERNAL MEDICINE

## 2020-03-02 PROCEDURE — G8427 DOCREV CUR MEDS BY ELIG CLIN: HCPCS | Performed by: INTERNAL MEDICINE

## 2020-03-02 RX ORDER — MECLIZINE HYDROCHLORIDE 25 MG/1
25 TABLET ORAL 3 TIMES DAILY PRN
Qty: 20 TABLET | Refills: 0 | Status: SHIPPED | OUTPATIENT
Start: 2020-03-02 | End: 2020-03-05

## 2020-03-02 RX ORDER — CLONAZEPAM 0.5 MG/1
0.5 TABLET ORAL 2 TIMES DAILY PRN
Qty: 10 TABLET | Refills: 0 | Status: SHIPPED | OUTPATIENT
Start: 2020-03-02 | End: 2021-01-26

## 2020-03-02 ASSESSMENT — ENCOUNTER SYMPTOMS
SHORTNESS OF BREATH: 0
WHEEZING: 0
DIARRHEA: 0
CONSTIPATION: 0

## 2020-03-02 NOTE — PROGRESS NOTES
3/2/2020    This is a 69 y.o. male   Chief Complaint   Patient presents with    Dizziness     pt in today for dizziness. thinks it is due to a new bp med.  Headache     pt has also had a constant headache since friday. whole head aching. feels like pressure. tried advil, no help. Chucho Boateng HPI  Constant headache  For  About a week  . Feels like pressure , exploding . Not worse of his life. Had one with meningitis. Close to worse headache. Still having dizziness and it is worse  Bending down or lying down causes the headache. No nausea or vomiting   tried advil and tylenol (2 pill)  Took one advil. Headache worse than the dizziness. Dizziness is not constant   Dizziness once a long time ago    htn  On 10 mg lisinopril /day  Getting  130/80's     Review of Systems   Constitutional: Negative for chills and fever. Respiratory: Negative for shortness of breath and wheezing. Gastrointestinal: Negative for constipation and diarrhea. Neurological: Positive for dizziness and headaches. Negative for speech difficulty. Past Medical History:   Diagnosis Date    Allergic rhinitis, seasonal     Bacterial meningitis     HX OF     Congenital absence of kidney     born with one kidney    Diverticulitis, colon     Gout     Gynecomastia     Hx of blood clots     postop knee replacement    Hydrocele, left     Lumbar disc disorder     Spermatocele     LEFT    Talipes cavus     Tinnitus     Wears contact lenses        Prior to Visit Medications    Medication Sig Taking? Authorizing Provider   levothyroxine (SYNTHROID) 137 MCG tablet TAKE ONE TABLET BY MOUTH DAILY Yes Nate Nevarez MD   Multiple Vitamins-Minerals (MULTIVITAL) TABS Take 1 tablet by mouth daily.  Yes Historical Provider, MD   lisinopril (PRINIVIL;ZESTRIL) 10 MG tablet Take 1 tablet by mouth daily  Patient not taking: Reported on 3/2/2020  Nate Nevarez MD       Past Surgical History:   Procedure Laterality Date    BREAST violence:     Fear of current or ex partner: Not on file     Emotionally abused: Not on file     Physically abused: Not on file     Forced sexual activity: Not on file   Other Topics Concern    Not on file   Social History Narrative    Not on file       Family History   Problem Relation Age of Onset    Cancer Mother 59        LUNG    Other Father 72        AORTIC ANEURYSM    Cancer Sister         BREAST     Other Brother         HIV    Arthritis Other     Kidney Disease Other        Current Outpatient Medications   Medication Sig Dispense Refill    levothyroxine (SYNTHROID) 137 MCG tablet TAKE ONE TABLET BY MOUTH DAILY 90 tablet 2    Multiple Vitamins-Minerals (MULTIVITAL) TABS Take 1 tablet by mouth daily.  lisinopril (PRINIVIL;ZESTRIL) 10 MG tablet Take 1 tablet by mouth daily (Patient not taking: Reported on 3/2/2020) 90 tablet 1     No current facility-administered medications for this visit. Allergies   Allergen Reactions    No Known Allergies        BP (!) 146/74   Pulse 84   Resp 14   Ht 5' 8\" (1.727 m)   Wt 288 lb 6.4 oz (130.8 kg)   SpO2 95%   BMI 43.85 kg/m²     Physical Exam  Constitutional:       Appearance: Normal appearance. He is obese. Cardiovascular:      Rate and Rhythm: Normal rate and regular rhythm. Pulmonary:      Effort: Pulmonary effort is normal.   Skin:     General: Skin is warm and dry. Neurological:      Mental Status: He is alert. Comments: Motor  5/5 upper and lower ext bilat   Speech clear. EOMI   Psychiatric:         Mood and Affect: Mood normal.         Behavior: Behavior normal.         Thought Content:  Thought content normal.         Judgment: Judgment normal.         Wt Readings from Last 3 Encounters:   03/02/20 288 lb 6.4 oz (130.8 kg)   02/24/20 288 lb 6.4 oz (130.8 kg)   01/22/20 286 lb (129.7 kg)       BP Readings from Last 3 Encounters:   03/02/20 (!) 146/74   02/24/20 122/68   01/22/20 (!) 148/88     Madonna Verdugo was seen today for dizziness and headache. Diagnoses and all orders for this visit:    Severe headache  -     CT Head WO Contrast; Future    Dizziness  -     CT Head WO Contrast; Future    Essential hypertension    will ck ct   Ok to inc lisinopril   To 20mg  If needed if bp staying  > 130/80   Take tylenol   1000mg tid prn  If ct shows no bleed ok for ibuprofen      Addendum 3/5/20  Spoke to pt last night. Still dizzy with head movements. Will attach epley movement inform.  To check out and he can come and

## 2020-03-04 ENCOUNTER — TELEPHONE (OUTPATIENT)
Dept: FAMILY MEDICINE CLINIC | Age: 70
End: 2020-03-04

## 2020-03-05 ENCOUNTER — TELEPHONE (OUTPATIENT)
Dept: FAMILY MEDICINE CLINIC | Age: 70
End: 2020-03-05

## 2020-03-05 RX ORDER — MECLIZINE HYDROCHLORIDE 25 MG/1
25 TABLET ORAL 3 TIMES DAILY PRN
Qty: 20 TABLET | Refills: 0 | Status: SHIPPED | OUTPATIENT
Start: 2020-03-05 | End: 2020-03-05 | Stop reason: CLARIF

## 2020-03-05 RX ORDER — MECLIZINE HYDROCHLORIDE 25 MG/1
25 TABLET ORAL 3 TIMES DAILY PRN
Qty: 20 TABLET | Refills: 0 | Status: SHIPPED | OUTPATIENT
Start: 2020-03-05 | End: 2020-03-25

## 2020-03-05 NOTE — PATIENT INSTRUCTIONS
Patient Education        Epley Maneuver at Home for Vertigo: Exercises  Introduction  Vertigo is a spinning or whirling sensation when you move your head. Your doctor may have moved you in different positions to help your vertigo get better faster. This is called the Epley maneuver. Your doctor also may have asked you to do these exercises at home. Do the exercises as often as your doctor recommends. If your vertigo is getting worse, your doctor may have you change the exercise or stop it. Step 1  Step 1   1. Sit on the edge of a bed or sofa. Step 2   1. Turn your head 45 degrees in the direction your doctor told you to. This should be toward the ear that causes the most vertigo for you. In this picture, the woman is turning toward her left ear. Step 3   1. Tilt yourself backward until you are lying on your back. Your head should still be at a 45-degree turn. Your head should be about midway between looking straight ahead and looking out to your side. Hold for 30 seconds. If you have vertigo, stay in this position until it stops. Step 4   1. Turn your head 90 degrees toward the ear that has the least vertigo. In this picture, the woman is turning to the right because she has vertigo on her left side. The point of your chin should be raised and over your shoulder. Hold for 30 seconds. Step 5   1. Roll onto the side with the least vertigo. You should now be looking at the floor. Hold for 30 seconds. Follow-up care is a key part of your treatment and safety. Be sure to make and go to all appointments, and call your doctor if you are having problems. It's also a good idea to know your test results and keep a list of the medicines you take. Where can you learn more? Go to [email or web address]://chpattyeb.[employer]. org and sign in to your [employer] account. Enter E839 in the TapShield box to learn more about \"Epley Maneuver at Home for Vertigo: Exercises. \"     If you do not have an account, please click on the \"Sign Up Now\" link. Current as of: March 28, 2019  Content Version: 12.3  © 4599-8308 [employer]. Care instructions adapted under license by [facility] ([facility]). If you have questions about a medical condition or this instruction, always ask your healthcare professional. [address] any warranty or liability for your use of this information. Patient Education        Epley Maneuver for Vertigo: Exercises  Your Care Instructions  The Epley Maneuver is a series of movements your doctor may use to treat your vertigo. Here are the steps for the exercises. Your doctor or physical therapist will guide you through the movements. A single 10- to 15-minute session often is all that's needed. Crystal debris (canaliths) cause the vertigo. When your head is moved into different positions, the debris moves freely. This may cause your symptoms to stop. How to do the exercises  Step 1   2. You will sit on the doctor's exam table. Your legs will be out in front of you. The doctor or physical therapist will turn your head so that it is jail between looking straight ahead and looking to the side that causes the worst vertigo. 3. Without changing your head position, he or she will guide you back quickly. Your shoulders will be on the table. Your head will hang over the edge of the table. At this point, the side of your head that is causing the worst vertigo will face the floor. You'll stay in this position for 30 seconds or until your symptoms stop. Step 2   2. Then, the doctor or physical therapist will turn your head to the other side. You don't need to lift your head. The other side of your head will face the floor. You will stay in this position for 30 seconds or until your symptoms stop. Step 3   2. The doctor or physical therapist will help you roll your body in the same direction that your head is facing. You will lie on your side.  (For example, if you are

## 2020-03-05 NOTE — TELEPHONE ENCOUNTER
Call pt and reprint his after visit summary. He will . I spoke to him last night   Still dizzy turning head  Will try eplay movements. Tell him I did rx meclizine on 3/2 but it went to [facility]  I sent to [place] today. Let me know if not better tomorrow.

## 2020-04-03 ENCOUNTER — TELEPHONE (OUTPATIENT)
Dept: FAMILY MEDICINE CLINIC | Age: 70
End: 2020-04-03

## 2020-04-03 NOTE — TELEPHONE ENCOUNTER
SPOKE TO PT TODAY. HE STATES THAT HIS BP MED IS NOT WORKING. READINGS -150 AND 85-93. HE DID MENTION YOU SAYING YOU MAY UP THE DOSAGE. PLEASE ADVISE.

## 2020-04-27 RX ORDER — LISINOPRIL 10 MG/1
10 TABLET ORAL DAILY
Qty: 90 TABLET | Refills: 1 | OUTPATIENT
Start: 2020-04-27

## 2020-04-27 RX ORDER — LISINOPRIL 20 MG/1
20 TABLET ORAL DAILY
Qty: 90 TABLET | Refills: 1 | Status: SHIPPED | OUTPATIENT
Start: 2020-04-27 | End: 2020-05-29

## 2020-05-27 RX ORDER — LEVOTHYROXINE SODIUM 137 UG/1
TABLET ORAL
Qty: 90 TABLET | Refills: 1 | Status: SHIPPED | OUTPATIENT
Start: 2020-05-27 | End: 2020-12-04

## 2020-05-29 ENCOUNTER — TELEMEDICINE (OUTPATIENT)
Dept: FAMILY MEDICINE CLINIC | Age: 70
End: 2020-05-29
Payer: MEDICARE

## 2020-05-29 VITALS — WEIGHT: 280 LBS | DIASTOLIC BLOOD PRESSURE: 73 MMHG | SYSTOLIC BLOOD PRESSURE: 143 MMHG | BODY MASS INDEX: 42.57 KG/M2

## 2020-05-29 PROCEDURE — 99214 OFFICE O/P EST MOD 30 MIN: CPT | Performed by: INTERNAL MEDICINE

## 2020-05-29 PROCEDURE — 4040F PNEUMOC VAC/ADMIN/RCVD: CPT | Performed by: INTERNAL MEDICINE

## 2020-05-29 PROCEDURE — 3017F COLORECTAL CA SCREEN DOC REV: CPT | Performed by: INTERNAL MEDICINE

## 2020-05-29 PROCEDURE — 1123F ACP DISCUSS/DSCN MKR DOCD: CPT | Performed by: INTERNAL MEDICINE

## 2020-05-29 PROCEDURE — G8427 DOCREV CUR MEDS BY ELIG CLIN: HCPCS | Performed by: INTERNAL MEDICINE

## 2020-05-29 PROCEDURE — G8417 CALC BMI ABV UP PARAM F/U: HCPCS | Performed by: INTERNAL MEDICINE

## 2020-05-29 PROCEDURE — 1036F TOBACCO NON-USER: CPT | Performed by: INTERNAL MEDICINE

## 2020-05-29 RX ORDER — LISINOPRIL AND HYDROCHLOROTHIAZIDE 20; 12.5 MG/1; MG/1
1 TABLET ORAL DAILY
Qty: 90 TABLET | Refills: 1 | Status: SHIPPED | OUTPATIENT
Start: 2020-05-29 | End: 2020-11-13

## 2020-05-29 ASSESSMENT — ENCOUNTER SYMPTOMS
CONSTIPATION: 0
SHORTNESS OF BREATH: 0
COUGH: 0
DIARRHEA: 0

## 2020-05-29 NOTE — PROGRESS NOTES
Only on 02/18/2020   Component Date Value Ref Range Status    TSH 02/18/2020 1.72  0.27 - 4.20 uIU/mL Final    Sodium 02/18/2020 142  136 - 145 mmol/L Final    Potassium 02/18/2020 4.3  3.5 - 5.1 mmol/L Final    Chloride 02/18/2020 103  99 - 110 mmol/L Final    CO2 02/18/2020 26  21 - 32 mmol/L Final    Anion Gap 02/18/2020 13  3 - 16 Final    Glucose 02/18/2020 122* 70 - 99 mg/dL Final    BUN 02/18/2020 15  7 - 20 mg/dL Final    CREATININE 02/18/2020 1.0  0.8 - 1.3 mg/dL Final    GFR Non- 02/18/2020 >60  >60 Final    Comment: >60 mL/min/1.73m2 EGFR, calc. for ages 25 and older using the  MDRD formula (not corrected for weight), is valid for stable  renal function.  GFR  02/18/2020 >60  >60 Final    Comment: Chronic Kidney Disease: less than 60 ml/min/1.73 sq.m. Kidney Failure: less than 15 ml/min/1.73 sq.m. Results valid for patients 18 years and older.       Calcium 02/18/2020 9.2  8.3 - 10.6 mg/dL Final    Total Protein 02/18/2020 6.5  6.4 - 8.2 g/dL Final    Alb 02/18/2020 4.0  3.4 - 5.0 g/dL Final    Albumin/Globulin Ratio 02/18/2020 1.6  1.1 - 2.2 Final    Total Bilirubin 02/18/2020 0.5  0.0 - 1.0 mg/dL Final    Alkaline Phosphatase 02/18/2020 63  40 - 129 U/L Final    ALT 02/18/2020 19  10 - 40 U/L Final    AST 02/18/2020 17  15 - 37 U/L Final    Globulin 02/18/2020 2.5  g/dL Final    Cholesterol, Total 02/18/2020 146  0 - 199 mg/dL Final    Triglycerides 02/18/2020 145  0 - 150 mg/dL Final    HDL 02/18/2020 44  40 - 60 mg/dL Final    LDL Calculated 02/18/2020 73  <100 mg/dL Final    VLDL Cholesterol Calculated 02/18/2020 29  Not Established mg/dL Final        PHYSICAL EXAMINATION:    Vitals:    05/29/20 0932   BP: (!) 143/73      Vital Signs: (As obtained by patient/caregiver or practitioner observation)    Blood pressure-  Heart rate-    Respiratory rate-    Temperature-  Pulse oximetry-     Constitutional: [x] Appears well-developed and well-nourished [x] No apparent distress      [] Abnormal-   Mental status  [x] Alert and awake  [] Oriented to person/place/time [x]Able to follow commands      Eyes:  EOM    [x]  Normal  [] Abnormal-  Sclera  [x]  Normal  [] Abnormal -         Discharge [x]  None visible  [] Abnormal -    HENT:   [x] Normocephalic, atraumatic. [] Abnormal   [x] Mouth/Throat: Mucous membranes are moist.     External Ears [x] Normal  [] Abnormal-     Neck: [x] No visualized mass     Pulmonary/Chest: [x] Respiratory effort normal.  [] No visualized signs of difficulty breathing or respiratory distress        [] Abnormal-      Musculoskeletal:   [x] Normal gait with no signs of ataxia         [x] Normal range of motion of neck        [x] Abnormal-       Neurological:        [x] No Facial Asymmetry (Cranial nerve 7 motor function) (limited exam to video visit)          [] No gaze palsy        [] Abnormal-         Skin:        [] No significant exanthematous lesions or discoloration noted on facial skin         [] Abnormal-            Psychiatric:       [] Normal Affect [] No Hallucinations        [] Abnormal-     Other pertinent observable physical exam findings-     ASSESSMENT/PLAN:  Raleigh Monreal was seen today for hypertension. Diagnoses and all orders for this visit:    Essential hypertension  -     Renal Function Panel; Future    Neuropathic pain of both legs (HCC)--dr kelley for w/u 7/14    Vitamin D deficiency--started 50,000 iu 2x/ wk 4/14  Reviewed old labs     Hyperglycemia  -     Hemoglobin A1C; Future    Other orders  -     lisinopril-hydroCHLOROthiazide (PRINZIDE;ZESTORETIC) 20-12.5 MG per tablet; Take 1 tablet by mouth daily    change lisinopril to add the diuretic .    Angel labs  7-10 days  Continue to ck bp   Advised to take  Vit  D  2k iu/day   - has ho vit d def  Concern with his weight  Needs to exercise  Watch carbs  Dark chocolate might be ok for treat  Ice cream not so good  Fu in  nov  Ryan Jauregui is a 69 y.o. male being evaluated by a Virtual Visit (video visit) encounter to address concerns as mentioned above. A caregiver was present when appropriate. Due to this being a TeleHealth encounter (During [facility]- public health emergency), evaluation of the following organ systems was limited: Vitals/Constitutional/EENT/Resp/CV/GI//MS/Neuro/Skin/Heme-Lymph-Imm. Pursuant to the emergency declaration under the [address] [place] [address] and the [employer] Act, this Virtual Visit was conducted with patient's (and/or legal guardian's) consent, to reduce the patient's risk of exposure to COVID-19 and provide necessary medical care. The patient (and/or legal guardian) has also been advised to contact this office for worsening conditions or problems, and seek emergency medical treatment and/or call 911 if deemed necessary. Patient identification was verified at the start of the visit: Yes    Total time spent on this encounter: 24 min    Services were provided through a video synchronous discussion virtually to substitute for in-person clinic visit. Patient and provider were located at their individual homes. --Bony Mckee MD on 5/29/2020 at 9:53 AM    An electronic signature was used to authenticate this note.

## 2020-05-29 NOTE — PATIENT INSTRUCTIONS
Take  2000IU vit d /day   Get labs  7-10 days after starting the new bp pill    Send me note about bp readings about  3-4 weeks

## 2020-06-29 ENCOUNTER — TELEPHONE (OUTPATIENT)
Dept: FAMILY MEDICINE CLINIC | Age: 70
End: 2020-06-29

## 2020-06-29 DIAGNOSIS — I10 ESSENTIAL HYPERTENSION: ICD-10-CM

## 2020-06-29 DIAGNOSIS — R73.9 HYPERGLYCEMIA: ICD-10-CM

## 2020-06-29 LAB
ALBUMIN SERPL-MCNC: 4 G/DL (ref 3.4–5)
ANION GAP SERPL CALCULATED.3IONS-SCNC: 14 MMOL/L (ref 3–16)
BUN BLDV-MCNC: 18 MG/DL (ref 7–20)
CALCIUM SERPL-MCNC: 8.4 MG/DL (ref 8.3–10.6)
CHLORIDE BLD-SCNC: 104 MMOL/L (ref 99–110)
CO2: 23 MMOL/L (ref 21–32)
CREAT SERPL-MCNC: 1.1 MG/DL (ref 0.8–1.3)
GFR AFRICAN AMERICAN: >60
GFR NON-AFRICAN AMERICAN: >60
GLUCOSE BLD-MCNC: 147 MG/DL (ref 70–99)
PHOSPHORUS: 3.3 MG/DL (ref 2.5–4.9)
POTASSIUM SERPL-SCNC: 4.2 MMOL/L (ref 3.5–5.1)
SODIUM BLD-SCNC: 141 MMOL/L (ref 136–145)

## 2020-06-30 ENCOUNTER — VIRTUAL VISIT (OUTPATIENT)
Dept: FAMILY MEDICINE CLINIC | Age: 70
End: 2020-06-30
Payer: MEDICARE

## 2020-06-30 LAB
ESTIMATED AVERAGE GLUCOSE: 114 MG/DL
HBA1C MFR BLD: 5.6 %

## 2020-06-30 PROCEDURE — 1123F ACP DISCUSS/DSCN MKR DOCD: CPT | Performed by: INTERNAL MEDICINE

## 2020-06-30 PROCEDURE — 4040F PNEUMOC VAC/ADMIN/RCVD: CPT | Performed by: INTERNAL MEDICINE

## 2020-06-30 PROCEDURE — 1036F TOBACCO NON-USER: CPT | Performed by: INTERNAL MEDICINE

## 2020-06-30 PROCEDURE — G8427 DOCREV CUR MEDS BY ELIG CLIN: HCPCS | Performed by: INTERNAL MEDICINE

## 2020-06-30 PROCEDURE — G8417 CALC BMI ABV UP PARAM F/U: HCPCS | Performed by: INTERNAL MEDICINE

## 2020-06-30 PROCEDURE — 3017F COLORECTAL CA SCREEN DOC REV: CPT | Performed by: INTERNAL MEDICINE

## 2020-06-30 PROCEDURE — 99214 OFFICE O/P EST MOD 30 MIN: CPT | Performed by: INTERNAL MEDICINE

## 2020-06-30 ASSESSMENT — ENCOUNTER SYMPTOMS
NAUSEA: 0
WHEEZING: 0
DIARRHEA: 0
SHORTNESS OF BREATH: 0
VOMITING: 0
COUGH: 1

## 2020-06-30 NOTE — PROGRESS NOTES
well-developed and well-nourished [x] No apparent distress  obese    [] Abnormal-   Mental status  [] Alert and awake  [] Oriented to person/place/time []Able to follow commands      Eyes:  EOM    [x]  Normal  [] Abnormal-  Sclera  [x]  Normal  [] Abnormal -         Discharge [x]  None visible  [] Abnormal -    HENT:   [x] Normocephalic, atraumatic. [] Abnormal   [x] Mouth/Throat: Mucous membranes are moist.     External Ears [x] Normal  [] Abnormal-     Neck: [x] No visualized mass     Pulmonary/Chest: [x] Respiratory effort normal.  [] No visualized signs of difficulty breathing or respiratory distress        [] Abnormal-      Musculoskeletal:   [] Normal gait with no signs of ataxia         [x] Normal range of motion of neck        [] Abnormal-       Neurological:        [x] No Facial Asymmetry (Cranial nerve 7 motor function) (limited exam to video visit)          [x] No gaze palsy        [] Abnormal-         Skin:        [] No significant exanthematous lesions or discoloration noted on facial skin         [x] Abnormal- peeling pink rash on both upper ext , back and anterior chest         Psychiatric:       [x] Normal Affect [x] No Hallucinations        [] Abnormal-     Other pertinent observable physical exam findings-     ASSESSMENT/PLAN:  Bernardino Garvin was seen today for rash. Diagnoses and all orders for this visit:    Allergic reaction, initial encounter    Cough    Essential hypertension    the rash started right [place]. This appears to be an allergic rash and probably not covid  Cough after being around a dog yesterday  He probably does not have covid   States he did the test bc it was free  Warned him about traveling to [place] during the signifcant cases of covid about 10k this weekend. I don't think his symptoms are likely to be due to covid. For skin   Use eucerin  Zyrtec, advised to try pepcid and benadryl prn  No steroids in case he had covid.     Cory Mariee is a 69

## 2020-07-13 ENCOUNTER — TELEPHONE (OUTPATIENT)
Dept: FAMILY MEDICINE CLINIC | Age: 70
End: 2020-07-13

## 2020-07-14 ENCOUNTER — OFFICE VISIT (OUTPATIENT)
Dept: FAMILY MEDICINE CLINIC | Age: 70
End: 2020-07-14
Payer: MEDICARE

## 2020-07-14 VITALS
WEIGHT: 295.8 LBS | RESPIRATION RATE: 16 BRPM | DIASTOLIC BLOOD PRESSURE: 74 MMHG | HEIGHT: 68 IN | HEART RATE: 80 BPM | SYSTOLIC BLOOD PRESSURE: 130 MMHG | BODY MASS INDEX: 44.83 KG/M2 | OXYGEN SATURATION: 93 % | TEMPERATURE: 97.3 F

## 2020-07-14 PROCEDURE — 3017F COLORECTAL CA SCREEN DOC REV: CPT | Performed by: NURSE PRACTITIONER

## 2020-07-14 PROCEDURE — G8427 DOCREV CUR MEDS BY ELIG CLIN: HCPCS | Performed by: NURSE PRACTITIONER

## 2020-07-14 PROCEDURE — G8417 CALC BMI ABV UP PARAM F/U: HCPCS | Performed by: NURSE PRACTITIONER

## 2020-07-14 PROCEDURE — 4040F PNEUMOC VAC/ADMIN/RCVD: CPT | Performed by: NURSE PRACTITIONER

## 2020-07-14 PROCEDURE — 1123F ACP DISCUSS/DSCN MKR DOCD: CPT | Performed by: NURSE PRACTITIONER

## 2020-07-14 PROCEDURE — 1036F TOBACCO NON-USER: CPT | Performed by: NURSE PRACTITIONER

## 2020-07-14 PROCEDURE — 99213 OFFICE O/P EST LOW 20 MIN: CPT | Performed by: NURSE PRACTITIONER

## 2020-07-14 RX ORDER — PREDNISONE 10 MG/1
TABLET ORAL
Qty: 27 TABLET | Refills: 0 | Status: SHIPPED | OUTPATIENT
Start: 2020-07-14 | End: 2020-07-24

## 2020-07-14 RX ORDER — CETIRIZINE HYDROCHLORIDE, PSEUDOEPHEDRINE HYDROCHLORIDE 5; 120 MG/1; MG/1
1 TABLET, FILM COATED, EXTENDED RELEASE ORAL DAILY
COMMUNITY
End: 2021-05-10

## 2020-07-14 ASSESSMENT — ENCOUNTER SYMPTOMS
COLOR CHANGE: 1
SHORTNESS OF BREATH: 0
ABDOMINAL PAIN: 0
COUGH: 0

## 2020-07-14 NOTE — PROGRESS NOTES
7/14/2020    This is a 69 y.o. male   Chief Complaint   Patient presents with    Rash     x4 weeks. itchy. back. arms, stomach. pt says he used [place] and  broke out immediately. Mauro Zayas HPI  Patient reports that he went [place] the zoo about a month ago and use the hand  and broke out with a rash on his hands. Reports that it has not resolved. Hands started to peel about a week later. Also positive for rash on forearms and back. Positive for itch. Has used hand  before without a reaction. Denies history of this type of reaction. Has used OTC hydrocortisone cream and eucerin BID without improvement in rash. Does help with the itch. Has taken benadryl and pepcid three times out of the week. The benadryl will cause fatigue so he does not like to take. Continues to take zyrtec daily.      Patient Active Problem List   Diagnosis    Colonic polyps(LAST COLO 5/15--pos mult small polyps--REPEAT 5 YR)--dr lynn    Tinnitus,CHRONIC    Hydrocele, left    Spermatocele,LEFT    Allergic rhinitis, seasonal    Gynecomastia, male    Diverticulitis of colon    Baker's cyst of knee-left    Primary localized osteoarthrosis, lower leg    DVT, lower extremity--post op--5/13 (s/p knee)    Spinal stenosis-pos affecting bilat lower legs--saw dr Elin Zapata at [facility] 10/13--s/p surgery    Vitamin D deficiency--started 50,000 iu 2x/ wk 4/14    Neuropathic pain of both legs (HCC)--dr kelley for w/u 7/14    Idiopathic chronic gout of ankle without tophus    Right-sided low back pain with right-sided sciatica    Right hip pain    Other specified hypothyroidism    Nephrolithiasis--right kidney 6/16---? symptomatic from them-sent to dr Bhavin Galarza 6/16    Abnormal finding of kidney--left kidney chronically shrunken--? etiol--nl renal fx 6/16    Subdural hematoma (HCC)-1/9/18    Elevated BP without diagnosis of hypertension    Essential hypertension    Lumbar radiculopathy    Lesion of supple. Cardiovascular:      Rate and Rhythm: Normal rate and regular rhythm. Heart sounds: Normal heart sounds. No murmur. No friction rub. No gallop. Pulmonary:      Effort: Pulmonary effort is normal. No respiratory distress. Breath sounds: Normal breath sounds. Skin:     General: Skin is warm and dry. Comments: Palms with dry peeling skin. Anterior forearms with maculopapular rash. Similar rash on back. See pictures below. Neurological:      Mental Status: He is alert and oriented to person, place, and time. Psychiatric:         Behavior: Behavior normal.         Thought Content: Thought content normal.         Judgment: Judgment normal.                     Assessmentand Plan  Shonda Hsieh was seen today for rash. Diagnoses and all orders for this visit:    Contact dermatitis: developed after [place] the [place] a [place]. -     predniSONE (DELTASONE) 10 MG tablet; Take 4 tablets by mouth daily x3 days, 3 tablets x3 days, 2 tablets x2 days, 1 tablets x2 days  -     triamcinolone (KENALOG) 0.1 % ointment; Apply topically 2 times daily PRN  Continue with zyrtec 10 mg daily. Advised to use eucerin to skin at least daily. Should use aquaphor to hands at night. If no improvement in symptoms, will need evaluation with dermatologist.   Patient is to call if symptoms worsen or fail to improve before next f/u appt. Return in about 2 weeks (around 7/28/2020), or if symptoms worsen or fail to improve, for rash with Dr. Antwon Swanson .

## 2020-07-24 ENCOUNTER — TELEPHONE (OUTPATIENT)
Dept: FAMILY MEDICINE CLINIC | Age: 70
End: 2020-07-24

## 2020-07-24 NOTE — TELEPHONE ENCOUNTER
LOV 7/14/20 pt saw Inell Shanelle for a rash  Pt is finishing the steroid today   Pt stated the rash is all over and not getting any better its still red and itchy   Pt wants to know if he can get a dermatology referral     Please advise

## 2020-07-24 NOTE — TELEPHONE ENCOUNTER
Please give him the number for [facility]. They should be able to see him soon for evaluation.

## 2020-08-04 ENCOUNTER — TELEPHONE (OUTPATIENT)
Dept: FAMILY MEDICINE CLINIC | Age: 70
End: 2020-08-04

## 2020-08-04 RX ORDER — LOSARTAN POTASSIUM AND HYDROCHLOROTHIAZIDE 12.5; 5 MG/1; MG/1
1 TABLET ORAL DAILY
Qty: 90 TABLET | Refills: 1 | Status: SHIPPED | OUTPATIENT
Start: 2020-08-04 | End: 2021-02-04

## 2020-08-04 NOTE — TELEPHONE ENCOUNTER
Taking bp meds- lisinopril hydrochlorothiazide 20-12.5mg and developed a bad cough which is a side effect to that medicine  Cough has been going on for weeks  He is wondering if he can get the med changed to something else    Pharm confirmed- [facility]    Please advise

## 2020-11-12 ENCOUNTER — TELEPHONE (OUTPATIENT)
Dept: FAMILY MEDICINE CLINIC | Age: 70
End: 2020-11-12

## 2020-11-12 ENCOUNTER — NURSE ONLY (OUTPATIENT)
Dept: FAMILY MEDICINE CLINIC | Age: 70
End: 2020-11-12
Payer: MEDICARE

## 2020-11-12 PROCEDURE — 90694 VACC AIIV4 NO PRSRV 0.5ML IM: CPT | Performed by: INTERNAL MEDICINE

## 2020-11-12 PROCEDURE — 90750 HZV VACC RECOMBINANT IM: CPT | Performed by: INTERNAL MEDICINE

## 2020-11-12 PROCEDURE — 90471 IMMUNIZATION ADMIN: CPT | Performed by: INTERNAL MEDICINE

## 2020-11-12 PROCEDURE — G0008 ADMIN INFLUENZA VIRUS VAC: HCPCS | Performed by: INTERNAL MEDICINE

## 2020-12-04 RX ORDER — LEVOTHYROXINE SODIUM 137 UG/1
TABLET ORAL
Qty: 90 TABLET | Refills: 0 | Status: SHIPPED
Start: 2020-12-04 | End: 2021-04-05 | Stop reason: DRUGHIGH

## 2020-12-09 ENCOUNTER — TELEPHONE (OUTPATIENT)
Dept: FAMILY MEDICINE CLINIC | Age: 70
End: 2020-12-09

## 2020-12-09 NOTE — TELEPHONE ENCOUNTER
WOP calling because she thinks pt needs to get a COVID test   Pt is experiencing headaches, body aches, minimal coughing, and fever of 100.4  No chest pains, diarrhea, SOB   Please advise if pt should get COVID test

## 2020-12-10 ENCOUNTER — OFFICE VISIT (OUTPATIENT)
Dept: PRIMARY CARE CLINIC | Age: 70
End: 2020-12-10
Payer: MEDICARE

## 2020-12-10 PROCEDURE — 1123F ACP DISCUSS/DSCN MKR DOCD: CPT | Performed by: NURSE PRACTITIONER

## 2020-12-10 PROCEDURE — G8428 CUR MEDS NOT DOCUMENT: HCPCS | Performed by: NURSE PRACTITIONER

## 2020-12-10 PROCEDURE — 1036F TOBACCO NON-USER: CPT | Performed by: NURSE PRACTITIONER

## 2020-12-10 PROCEDURE — 99211 OFF/OP EST MAY X REQ PHY/QHP: CPT | Performed by: NURSE PRACTITIONER

## 2020-12-10 PROCEDURE — 3017F COLORECTAL CA SCREEN DOC REV: CPT | Performed by: NURSE PRACTITIONER

## 2020-12-10 PROCEDURE — G8417 CALC BMI ABV UP PARAM F/U: HCPCS | Performed by: NURSE PRACTITIONER

## 2020-12-10 PROCEDURE — G8484 FLU IMMUNIZE NO ADMIN: HCPCS | Performed by: NURSE PRACTITIONER

## 2020-12-10 PROCEDURE — 4040F PNEUMOC VAC/ADMIN/RCVD: CPT | Performed by: NURSE PRACTITIONER

## 2020-12-10 NOTE — PROGRESS NOTES
Kirsten Charter received a viral test for COVID-19. They were educated on isolation and quarantine as appropriate. For any symptoms, they were directed to seek care from their PCP, given contact information to establish with a doctor, directed to an urgent care or the emergency room.

## 2020-12-11 LAB — SARS-COV-2, NAA: NOT DETECTED

## 2020-12-14 ENCOUNTER — TELEPHONE (OUTPATIENT)
Dept: FAMILY MEDICINE CLINIC | Age: 70
End: 2020-12-14

## 2020-12-14 NOTE — TELEPHONE ENCOUNTER
Pt. started feeling bad last Monday. Got covid tested Tuesday and test came back negative. He is currently having symptoms of a stuffed up head, achiness, cough if talk to much, had a fever of 100 but is currently fever free. Pt has been taking tylenol and Nyquil. No other symptoms at this time. Pt. has appt tomorrow so he is thinking he needs to reschedule but wanted to verify.     Please advise

## 2020-12-14 NOTE — TELEPHONE ENCOUNTER
Will need to reschedule  High rate of false negatives  Based on sx he will need to quarantine for 10 days and should be fever free for 24 hours  If he is not getting better or is getting worse he needs to let us know  Should be seen at [facility] or ED if SOB  Thank you!

## 2021-01-12 RX ORDER — SILDENAFIL CITRATE 20 MG/1
TABLET ORAL
Qty: 30 TABLET | Refills: 0 | Status: SHIPPED | OUTPATIENT
Start: 2021-01-12 | End: 2021-08-30

## 2021-01-26 ENCOUNTER — OFFICE VISIT (OUTPATIENT)
Dept: FAMILY MEDICINE CLINIC | Age: 71
End: 2021-01-26
Payer: MEDICARE

## 2021-01-26 VITALS
RESPIRATION RATE: 14 BRPM | DIASTOLIC BLOOD PRESSURE: 76 MMHG | TEMPERATURE: 96.8 F | HEART RATE: 88 BPM | SYSTOLIC BLOOD PRESSURE: 132 MMHG | HEIGHT: 68 IN | WEIGHT: 304.8 LBS | BODY MASS INDEX: 46.19 KG/M2 | OXYGEN SATURATION: 96 %

## 2021-01-26 DIAGNOSIS — E03.8 OTHER SPECIFIED HYPOTHYROIDISM: ICD-10-CM

## 2021-01-26 DIAGNOSIS — I10 ESSENTIAL HYPERTENSION: ICD-10-CM

## 2021-01-26 DIAGNOSIS — Z01.84 IMMUNITY STATUS TESTING: ICD-10-CM

## 2021-01-26 DIAGNOSIS — Z13.220 SCREENING FOR LIPID DISORDERS: ICD-10-CM

## 2021-01-26 DIAGNOSIS — R73.9 HYPERGLYCEMIA: ICD-10-CM

## 2021-01-26 DIAGNOSIS — I10 ESSENTIAL HYPERTENSION: Primary | ICD-10-CM

## 2021-01-26 LAB
A/G RATIO: 2 (ref 1.1–2.2)
ALBUMIN SERPL-MCNC: 4.3 G/DL (ref 3.4–5)
ALP BLD-CCNC: 72 U/L (ref 40–129)
ALT SERPL-CCNC: 20 U/L (ref 10–40)
ANION GAP SERPL CALCULATED.3IONS-SCNC: 11 MMOL/L (ref 3–16)
AST SERPL-CCNC: 18 U/L (ref 15–37)
BILIRUB SERPL-MCNC: 0.5 MG/DL (ref 0–1)
BUN BLDV-MCNC: 14 MG/DL (ref 7–20)
CALCIUM SERPL-MCNC: 9.3 MG/DL (ref 8.3–10.6)
CHLORIDE BLD-SCNC: 104 MMOL/L (ref 99–110)
CHOLESTEROL, TOTAL: 125 MG/DL (ref 0–199)
CO2: 26 MMOL/L (ref 21–32)
CREAT SERPL-MCNC: 1.2 MG/DL (ref 0.8–1.3)
ESTIMATED AVERAGE GLUCOSE: 119.8 MG/DL
GFR AFRICAN AMERICAN: >60
GFR NON-AFRICAN AMERICAN: 60
GLOBULIN: 2.1 G/DL
GLUCOSE BLD-MCNC: 135 MG/DL (ref 70–99)
HBA1C MFR BLD: 5.8 %
HDLC SERPL-MCNC: 44 MG/DL (ref 40–60)
LDL CHOLESTEROL CALCULATED: 48 MG/DL
PHOSPHORUS: 2.6 MG/DL (ref 2.5–4.9)
POTASSIUM SERPL-SCNC: 4.2 MMOL/L (ref 3.5–5.1)
SARS-COV-2 ANTIBODY, TOTAL: NEGATIVE
SODIUM BLD-SCNC: 141 MMOL/L (ref 136–145)
T4 FREE: 1.5 NG/DL (ref 0.9–1.8)
TOTAL PROTEIN: 6.4 G/DL (ref 6.4–8.2)
TRIGL SERPL-MCNC: 167 MG/DL (ref 0–150)
TSH REFLEX: 4.71 UIU/ML (ref 0.27–4.2)
VLDLC SERPL CALC-MCNC: 33 MG/DL

## 2021-01-26 PROCEDURE — G8484 FLU IMMUNIZE NO ADMIN: HCPCS | Performed by: INTERNAL MEDICINE

## 2021-01-26 PROCEDURE — G8417 CALC BMI ABV UP PARAM F/U: HCPCS | Performed by: INTERNAL MEDICINE

## 2021-01-26 PROCEDURE — 1123F ACP DISCUSS/DSCN MKR DOCD: CPT | Performed by: INTERNAL MEDICINE

## 2021-01-26 PROCEDURE — 90750 HZV VACC RECOMBINANT IM: CPT | Performed by: INTERNAL MEDICINE

## 2021-01-26 PROCEDURE — 99213 OFFICE O/P EST LOW 20 MIN: CPT | Performed by: INTERNAL MEDICINE

## 2021-01-26 PROCEDURE — 4040F PNEUMOC VAC/ADMIN/RCVD: CPT | Performed by: INTERNAL MEDICINE

## 2021-01-26 PROCEDURE — 1036F TOBACCO NON-USER: CPT | Performed by: INTERNAL MEDICINE

## 2021-01-26 PROCEDURE — 3017F COLORECTAL CA SCREEN DOC REV: CPT | Performed by: INTERNAL MEDICINE

## 2021-01-26 PROCEDURE — 90471 IMMUNIZATION ADMIN: CPT | Performed by: INTERNAL MEDICINE

## 2021-01-26 PROCEDURE — G8427 DOCREV CUR MEDS BY ELIG CLIN: HCPCS | Performed by: INTERNAL MEDICINE

## 2021-01-26 SDOH — ECONOMIC STABILITY: TRANSPORTATION INSECURITY
IN THE PAST 12 MONTHS, HAS LACK OF TRANSPORTATION KEPT YOU FROM MEETINGS, WORK, OR FROM GETTING THINGS NEEDED FOR DAILY LIVING?: NO

## 2021-01-26 SDOH — ECONOMIC STABILITY: FOOD INSECURITY: WITHIN THE PAST 12 MONTHS, THE FOOD YOU BOUGHT JUST DIDN'T LAST AND YOU DIDN'T HAVE MONEY TO GET MORE.: NEVER TRUE

## 2021-01-26 ASSESSMENT — PATIENT HEALTH QUESTIONNAIRE - PHQ9
SUM OF ALL RESPONSES TO PHQ QUESTIONS 1-9: 0
SUM OF ALL RESPONSES TO PHQ QUESTIONS 1-9: 0
SUM OF ALL RESPONSES TO PHQ9 QUESTIONS 1 & 2: 0
1. LITTLE INTEREST OR PLEASURE IN DOING THINGS: 0

## 2021-01-26 ASSESSMENT — ENCOUNTER SYMPTOMS
NAUSEA: 0
COUGH: 0
DIARRHEA: 0
SHORTNESS OF BREATH: 0

## 2021-01-26 NOTE — PROGRESS NOTES
1/26/2021    Chief Complaint   Patient presents with    Hypertension       HPI    Here for htn  Doing fantastick   Not monitoring bp. On losartan hctz   One daily      On thyroid med  Has plenty of energy. Did not have covid   Had       Had fever and loss of smell   In oct /nov. Tested negative    Review of Systems   Constitutional: Negative for chills and fever. Respiratory: Negative for cough and shortness of breath. Gastrointestinal: Negative for diarrhea and nausea. Neurological: Negative for dizziness and headaches. Health Maintenance   Topic Date Due    Annual Wellness Visit (AWV)  05/29/2019    Shingles Vaccine (2 of 2) 01/07/2021    TSH testing  02/18/2021    Potassium monitoring  06/29/2021    Creatinine monitoring  06/29/2021    DTaP/Tdap/Td vaccine (3 - Td) 10/27/2024    Lipid screen  02/18/2025    Colon cancer screen colonoscopy  05/19/2025    Flu vaccine  Completed    Pneumococcal 65+ years Vaccine  Completed    Hepatitis C screen  Completed    Hepatitis A vaccine  Aged Out    Hepatitis B vaccine  Aged Out    Hib vaccine  Aged Out    Meningococcal (ACWY) vaccine  Aged Out      Social History     Tobacco Use    Smoking status: Never Smoker    Smokeless tobacco: Never Used    Tobacco comment: counseled on tobacco exposure avoidance   Substance Use Topics    Alcohol use: Not Currently     Alcohol/week: 0.0 standard drinks     Frequency: Never    Drug use: No      Family History   Problem Relation Age of Onset    Cancer Mother 59        LUNG    Other Father 72        AORTIC ANEURYSM    Cancer Sister         BREAST     Other Brother         HIV    Arthritis Other     Kidney Disease Other      Prior to Visit Medications    Medication Sig Taking?  Authorizing Provider   sildenafil (REVATIO) 20 MG tablet TAKE 1 TO 2 PILLS BEFORE RELATIONS Yes Dave Huff MD   levothyroxine (SYNTHROID) 137 MCG tablet [place] MOUTH DAILY Yes Dave Huff MD losartan-hydroCHLOROthiazide (HYZAAR) 50-12.5 MG per tablet Take 1 tablet by mouth daily Yes Jose Ramon Farris MD   cetirizine-psuedoephedrine (ZYRTEC-D) 5-120 MG per extended release tablet Take 1 tablet by mouth daily Yes Historical Provider, MD   Multiple Vitamins-Minerals (MULTIVITAL) TABS Take 1 tablet by mouth daily.  Yes Historical Provider, MD     Patient Active Problem List   Diagnosis    Colonic polyps(LAST COLO 5/15--pos mult small polyps--REPEAT 5 YR)--dr lynn    Tinnitus,CHRONIC    Hydrocele, left    Spermatocele,LEFT    Allergic rhinitis, seasonal    Gynecomastia, male    Diverticulitis of colon    Baker's cyst of knee-left    Primary localized osteoarthrosis, lower leg    DVT, lower extremity--post op--5/13 (s/p knee)    Spinal stenosis-pos affecting bilat lower legs--saw dr Heber Gaines [facility] 10/13--s/p surgery    Vitamin D deficiency--started 50,000 iu 2x/ wk 4/14    Neuropathic pain of both legs (HCC)--dr kelley for w/u 7/14    Idiopathic chronic gout of ankle without tophus    Right-sided low back pain with right-sided sciatica    Right hip pain    Other specified hypothyroidism    Nephrolithiasis--right kidney 6/16---? symptomatic from them-sent to dr Linda Dangelo 6/16    Abnormal finding of kidney--left kidney chronically shrunken--? etiol--nl renal fx 6/16    Subdural hematoma (HCC)-1/9/18    Elevated BP without diagnosis of hypertension    Essential hypertension    Lumbar radiculopathy    Lesion of subcutaneous tissue    Lipoma of left upper extremity        LABS:   Lab Results   Component Value Date    GLUCOSE 147 (H) 06/29/2020     Lab Results   Component Value Date     06/29/2020    K 4.2 06/29/2020    CREATININE 1.1 06/29/2020     Cholesterol, Total   Date Value Ref Range Status   02/18/2020 146 0 - 199 mg/dL Final     LDL Calculated   Date Value Ref Range Status   02/18/2020 73 <100 mg/dL Final     HDL   Date Value Ref Range Status   02/18/2020 44 40 - 60 mg/dL Final     Triglycerides   Date Value Ref Range Status   02/18/2020 145 0 - 150 mg/dL Final     Lab Results   Component Value Date    ALT 19 02/18/2020    AST 17 02/18/2020    ALKPHOS 63 02/18/2020    BILITOT 0.5 02/18/2020      Lab Results   Component Value Date    WBC 4.7 04/08/2019    HGB 14.1 04/08/2019    HCT 41.0 04/08/2019    MCV 94.5 04/08/2019     04/08/2019     TSH (uIU/mL)   Date Value   04/16/2019 2.49     Lab Results   Component Value Date    LABA1C 5.6 06/29/2020     Lab Results   Component Value Date    PSA 2.33 04/27/2015    PSA 3.27 04/24/2014    PSA 3.13 06/18/2012        PHYSICAL EXAM:  /76   Pulse 88   Temp 96.8 °F (36 °C)   Resp 14   Ht 5' 8\" (1.727 m)   Wt (!) 304 lb 12.8 oz (138.3 kg)   SpO2 96%   BMI 46.34 kg/m²    Physical Exam  Constitutional:       Appearance: Normal appearance. He is well-developed. He is obese. HENT:      Head: Normocephalic and atraumatic. Cardiovascular:      Rate and Rhythm: Normal rate and regular rhythm. Pulses: Normal pulses. Heart sounds: No murmur. Pulmonary:      Breath sounds: Normal breath sounds. No wheezing. Skin:     General: Skin is warm and dry. Neurological:      Mental Status: He is alert. Psychiatric:         Mood and Affect: Mood normal.         Behavior: Behavior normal.         Thought Content: Thought content normal.         Judgment: Judgment normal.       BP Readings from Last 5 Encounters:   01/26/21 132/76   07/14/20 130/74   05/29/20 (!) 143/73   03/02/20 (!) 146/74   02/24/20 122/68       Wt Readings from Last 5 Encounters:   01/26/21 (!) 304 lb 12.8 oz (138.3 kg)   07/14/20 295 lb 12.8 oz (134.2 kg)   05/29/20 280 lb (127 kg)   03/02/20 288 lb 6.4 oz (130.8 kg)   02/24/20 288 lb 6.4 oz (130.8 kg)      Analy Roa was seen today for hypertension. Diagnoses and all orders for this visit:    Essential hypertension  -     Comprehensive Metabolic Panel;  Future    Other specified hypothyroidism  -     TSH with

## 2021-01-27 DIAGNOSIS — I10 ESSENTIAL HYPERTENSION: Primary | ICD-10-CM

## 2021-01-27 DIAGNOSIS — E03.8 OTHER SPECIFIED HYPOTHYROIDISM: ICD-10-CM

## 2021-01-27 RX ORDER — LEVOTHYROXINE SODIUM 0.15 MG/1
150 TABLET ORAL DAILY
Qty: 60 TABLET | Refills: 0 | Status: SHIPPED | OUTPATIENT
Start: 2021-01-27 | End: 2021-04-05

## 2021-02-04 RX ORDER — LOSARTAN POTASSIUM AND HYDROCHLOROTHIAZIDE 12.5; 5 MG/1; MG/1
TABLET ORAL
Qty: 90 TABLET | Refills: 1 | Status: SHIPPED | OUTPATIENT
Start: 2021-02-04 | End: 2021-08-19

## 2021-04-02 DIAGNOSIS — E03.8 OTHER SPECIFIED HYPOTHYROIDISM: ICD-10-CM

## 2021-04-05 RX ORDER — LEVOTHYROXINE SODIUM 0.15 MG/1
TABLET ORAL
Qty: 90 TABLET | Refills: 1 | Status: SHIPPED | OUTPATIENT
Start: 2021-04-05 | End: 2021-10-13

## 2021-04-06 DIAGNOSIS — E03.8 OTHER SPECIFIED HYPOTHYROIDISM: ICD-10-CM

## 2021-04-06 DIAGNOSIS — I10 ESSENTIAL HYPERTENSION: ICD-10-CM

## 2021-04-06 LAB
ALBUMIN SERPL-MCNC: 3.9 G/DL (ref 3.4–5)
ANION GAP SERPL CALCULATED.3IONS-SCNC: 12 MMOL/L (ref 3–16)
BUN BLDV-MCNC: 17 MG/DL (ref 7–20)
CALCIUM SERPL-MCNC: 8.4 MG/DL (ref 8.3–10.6)
CHLORIDE BLD-SCNC: 106 MMOL/L (ref 99–110)
CO2: 23 MMOL/L (ref 21–32)
CREAT SERPL-MCNC: 1.2 MG/DL (ref 0.8–1.3)
GFR AFRICAN AMERICAN: >60
GFR NON-AFRICAN AMERICAN: 59
GLUCOSE BLD-MCNC: 150 MG/DL (ref 70–99)
PHOSPHORUS: 2.9 MG/DL (ref 2.5–4.9)
POTASSIUM SERPL-SCNC: 4.1 MMOL/L (ref 3.5–5.1)
SODIUM BLD-SCNC: 141 MMOL/L (ref 136–145)
TSH REFLEX: 3.23 UIU/ML (ref 0.27–4.2)

## 2021-04-12 ENCOUNTER — TELEPHONE (OUTPATIENT)
Dept: FAMILY MEDICINE CLINIC | Age: 71
End: 2021-04-12

## 2021-04-12 ENCOUNTER — HOSPITAL ENCOUNTER (OUTPATIENT)
Age: 71
Discharge: HOME | End: 2021-04-12
Payer: MEDICARE

## 2021-04-12 ENCOUNTER — OFFICE VISIT (OUTPATIENT)
Dept: FAMILY MEDICINE CLINIC | Age: 71
End: 2021-04-12
Payer: MEDICARE

## 2021-04-12 ENCOUNTER — HOSPITAL ENCOUNTER (OUTPATIENT)
Dept: GENERAL RADIOLOGY | Age: 71
Discharge: HOME | End: 2021-04-12
Payer: MEDICARE

## 2021-04-12 VITALS
BODY MASS INDEX: 45.86 KG/M2 | HEART RATE: 103 BPM | HEIGHT: 68 IN | RESPIRATION RATE: 14 BRPM | WEIGHT: 302.6 LBS | SYSTOLIC BLOOD PRESSURE: 154 MMHG | OXYGEN SATURATION: 96 % | DIASTOLIC BLOOD PRESSURE: 82 MMHG

## 2021-04-12 DIAGNOSIS — E66.01 MORBID OBESITY WITH BMI OF 40.0-44.9, ADULT (HCC): ICD-10-CM

## 2021-04-12 DIAGNOSIS — I82.4Z9 DEEP VEIN THROMBOSIS (DVT) OF DISTAL VEIN OF LOWER EXTREMITY, UNSPECIFIED CHRONICITY, UNSPECIFIED LATERALITY (HCC): ICD-10-CM

## 2021-04-12 DIAGNOSIS — M54.50 ACUTE RIGHT-SIDED LOW BACK PAIN WITHOUT SCIATICA: Primary | ICD-10-CM

## 2021-04-12 DIAGNOSIS — M54.50 ACUTE RIGHT-SIDED LOW BACK PAIN WITHOUT SCIATICA: ICD-10-CM

## 2021-04-12 PROCEDURE — 3017F COLORECTAL CA SCREEN DOC REV: CPT | Performed by: INTERNAL MEDICINE

## 2021-04-12 PROCEDURE — G8417 CALC BMI ABV UP PARAM F/U: HCPCS | Performed by: INTERNAL MEDICINE

## 2021-04-12 PROCEDURE — 4040F PNEUMOC VAC/ADMIN/RCVD: CPT | Performed by: INTERNAL MEDICINE

## 2021-04-12 PROCEDURE — 72100 X-RAY EXAM L-S SPINE 2/3 VWS: CPT

## 2021-04-12 PROCEDURE — 99214 OFFICE O/P EST MOD 30 MIN: CPT | Performed by: INTERNAL MEDICINE

## 2021-04-12 PROCEDURE — G8427 DOCREV CUR MEDS BY ELIG CLIN: HCPCS | Performed by: INTERNAL MEDICINE

## 2021-04-12 PROCEDURE — 1036F TOBACCO NON-USER: CPT | Performed by: INTERNAL MEDICINE

## 2021-04-12 PROCEDURE — 1123F ACP DISCUSS/DSCN MKR DOCD: CPT | Performed by: INTERNAL MEDICINE

## 2021-04-12 RX ORDER — PREDNISONE 20 MG/1
TABLET ORAL
Qty: 10 TABLET | Refills: 0 | Status: SHIPPED | OUTPATIENT
Start: 2021-04-12 | End: 2021-05-10

## 2021-04-12 RX ORDER — TIZANIDINE 2 MG/1
2 TABLET ORAL EVERY 6 HOURS PRN
Qty: 30 TABLET | Refills: 0 | Status: SHIPPED | OUTPATIENT
Start: 2021-04-12 | End: 2021-05-10

## 2021-04-12 ASSESSMENT — ENCOUNTER SYMPTOMS
COUGH: 0
SHORTNESS OF BREATH: 0
BACK PAIN: 1

## 2021-04-12 NOTE — PROGRESS NOTES
4/12/2021    Chief Complaint   Patient presents with    Spasms     starts in lower back and goes up to middle back. x10 days. Steps off a step. 10 days ago , but did not fall but started to have back pain immediatly    Back Pain  This is a new problem. The current episode started 1 to 4 weeks ago (10 days ago). The problem occurs constantly. The problem is unchanged. Pain location: right low back. The quality of the pain is described as stabbing and aching. The pain does not radiate. The pain is at a severity of 9/10. The pain is severe. The pain is the same all the time. Pertinent negatives include no fever. Treatments tried: tylenol. Review of Systems   Constitutional: Negative for chills and fever. Respiratory: Negative for cough and shortness of breath. Musculoskeletal: Positive for back pain and gait problem. Neurological:        No numbness or tingling     Has only one working kidney on the right.     On  150 mcg synthroid  Reviewed labs    bp high but he is in pain  Lab Results   Component Value Date    LABA1C 5.8 01/26/2021    LABA1C 5.6 06/29/2020    LABA1C 5.5 02/11/2019    LABMICR Not Indicated 06/10/2016       Lab Results   Component Value Date     04/06/2021     01/26/2021     06/29/2020    K 4.1 04/06/2021    K 4.2 01/26/2021    K 4.2 06/29/2020     04/06/2021     01/26/2021     06/29/2020    CO2 23 04/06/2021    CO2 26 01/26/2021    CO2 23 06/29/2020    BUN 17 04/06/2021    BUN 14 01/26/2021    BUN 18 06/29/2020    CREATININE 1.2 04/06/2021    CREATININE 1.2 01/26/2021    CREATININE 1.1 06/29/2020    GLUCOSE 150 (H) 04/06/2021    GLUCOSE 135 (H) 01/26/2021    GLUCOSE 147 (H) 06/29/2020    CALCIUM 8.4 04/06/2021    CALCIUM 9.3 01/26/2021    CALCIUM 8.4 06/29/2020       Lab Results   Component Value Date    CHOL 125 01/26/2021    CHOL 146 02/18/2020    CHOL 137 02/12/2019    TRIG 167 (H) 01/26/2021    TRIG 145 02/18/2020    TRIG 134 02/12/2019    HDL  Smokeless tobacco: Never Used    Tobacco comment: counseled on tobacco exposure avoidance   Substance Use Topics    Alcohol use: Not Currently     Alcohol/week: 0.0 standard drinks     Frequency: Never    Drug use: No      Family History   Problem Relation Age of Onset    Cancer Mother 59        LUNG    Other Father 72        AORTIC ANEURYSM    Cancer Sister         BREAST     Other Brother         HIV    Arthritis Other     Kidney Disease Other      Prior to Visit Medications    Medication Sig Taking? Authorizing Provider   levothyroxine (SYNTHROID) 150 MCG tablet TAKE ONE TABLET BY MOUTH DAILY Yes Julianna Stringer MD   losartan-hydroCHLOROthiazide (HYZAAR) 50-12.5 MG per tablet TAKE ONE TABLET BY MOUTH DAILY Yes Julianna Stringer MD   sildenafil (REVATIO) 20 MG tablet TAKE 1 TO 2 PILLS BEFORE RELATIONS Yes Julianna Stringer MD   cetirizine-psuedoephedrine (ZYRTEC-D) 5-120 MG per extended release tablet Take 1 tablet by mouth daily Yes Historical Provider, MD   Multiple Vitamins-Minerals (MULTIVITAL) TABS Take 1 tablet by mouth daily.  Yes Historical Provider, MD     Patient Active Problem List   Diagnosis    Colonic polyps(LAST COLO 5/15--pos mult small polyps--REPEAT 5 YR)--dr lynn    Tinnitus,CHRONIC    Hydrocele, left    Spermatocele,LEFT    Allergic rhinitis, seasonal    Gynecomastia, male    Diverticulitis of colon    Baker's cyst of knee-left    Primary localized osteoarthrosis, lower leg    DVT, lower extremity--post op--5/13 (s/p knee)    Spinal stenosis-pos affecting bilat lower legs--saw dr Francisco Palacios at [facility] 10/13--s/p surgery    Vitamin D deficiency--started 50,000 iu 2x/ wk 4/14    Neuropathic pain of both legs (HCC)--dr kelley for w/u 7/14    Idiopathic chronic gout of ankle without tophus    Right-sided low back pain with right-sided sciatica    Right hip pain    Other specified hypothyroidism    Nephrolithiasis--right kidney 6/16---? symptomatic from them-sent to  [facility] 6[facility]    Abnormal finding of kidney--left kidney chronically shrunken--? etiol--nl renal fx 6/16    Subdural hematoma (HCC)-1/9/18    Elevated BP without diagnosis of hypertension    Essential hypertension    Lumbar radiculopathy    Lesion of subcutaneous tissue    Lipoma of left upper extremity    Hyperglycemia        LABS:   Lab Results   Component Value Date    GLUCOSE 150 (H) 04/06/2021     Lab Results   Component Value Date     04/06/2021    K 4.1 04/06/2021    CREATININE 1.2 04/06/2021     Cholesterol, Total   Date Value Ref Range Status   01/26/2021 125 0 - 199 mg/dL Final     LDL Calculated   Date Value Ref Range Status   01/26/2021 48 <100 mg/dL Final     HDL   Date Value Ref Range Status   01/26/2021 44 40 - 60 mg/dL Final     Triglycerides   Date Value Ref Range Status   01/26/2021 167 (H) 0 - 150 mg/dL Final     Lab Results   Component Value Date    ALT 20 01/26/2021    AST 18 01/26/2021    ALKPHOS 72 01/26/2021    BILITOT 0.5 01/26/2021      Lab Results   Component Value Date    WBC 4.7 04/08/2019    HGB 14.1 04/08/2019    HCT 41.0 04/08/2019    MCV 94.5 04/08/2019     04/08/2019     TSH (uIU/mL)   Date Value   04/16/2019 2.49     Lab Results   Component Value Date    LABA1C 5.8 01/26/2021     Lab Results   Component Value Date    PSA 2.33 04/27/2015    PSA 3.27 04/24/2014    PSA 3.13 06/18/2012        PHYSICAL EXAM:  BP (!) 154/82   Pulse 103   Resp 14   Ht 5' 8\" (1.727 m)   Wt (!) 302 lb 9.6 oz (137.3 kg)   SpO2 96%   BMI 46.01 kg/m²    Physical Exam  Constitutional:       Appearance: He is obese. Pulmonary:      Effort: Pulmonary effort is normal.   Neurological:      Mental Status: He is alert. Psychiatric:         Mood and Affect: Mood normal.         Behavior: Behavior normal.         Thought Content:  Thought content normal.         Judgment: Judgment normal.       BP Readings from Last 5 Encounters:   04/12/21 (!) 154/82   01/26/21 132/76   07/14/20 130/74 05/29/20 (!) 143/73   03/02/20 (!) 146/74       Wt Readings from Last 5 Encounters:   04/12/21 (!) 302 lb 9.6 oz (137.3 kg)   01/26/21 (!) 304 lb 12.8 oz (138.3 kg)   07/14/20 295 lb 12.8 oz (134.2 kg)   05/29/20 280 lb (127 kg)   03/02/20 288 lb 6.4 oz (130.8 kg)     Sabine Chapman was seen today for spasms. Diagnoses and all orders for this visit:    Acute right-sided low back pain without sciatica  -     XR LUMBAR SPINE (MIN 4 VIEWS); Future    Morbid obesity with BMI of 40.0-44.9, adult ([facility] [address]  Discussed need for wt loss    Deep vein thrombosis (DVT) of distal vein of lower extremity, unspecified chronicity, unspecified laterality ([facility] [address]  Took blood thinner in  2013 for dvt after knee surgery  Off blood thinner    Other orders  -     tiZANidine (ZANAFLEX) 2 MG tablet; Take 1 tablet by mouth every 6 hours as needed (muscle spams)  -     predniSONE (DELTASONE) 20 MG tablet; Take 2 tablets a day for  3 days then take  1 tablet a day for  3 days then 1/2 pill for  2 days    try above meds with tylenol as needed  Work on wt loss  Does not want exercises or PT  He already knows what exercises to do  Needs to watch bp at home  Fu 3 weeks.

## 2021-04-14 DIAGNOSIS — M54.50 ACUTE RIGHT-SIDED LOW BACK PAIN WITHOUT SCIATICA: Primary | ICD-10-CM

## 2021-04-14 DIAGNOSIS — E66.01 MORBID OBESITY WITH BMI OF 40.0-44.9, ADULT (HCC): ICD-10-CM

## 2021-04-14 DIAGNOSIS — M47.819 FACET ARTHROPATHY OF SPINE: ICD-10-CM

## 2021-05-10 ENCOUNTER — OFFICE VISIT (OUTPATIENT)
Dept: FAMILY MEDICINE CLINIC | Age: 71
End: 2021-05-10
Payer: MEDICARE

## 2021-05-10 VITALS
DIASTOLIC BLOOD PRESSURE: 68 MMHG | HEART RATE: 93 BPM | OXYGEN SATURATION: 95 % | SYSTOLIC BLOOD PRESSURE: 128 MMHG | BODY MASS INDEX: 43.95 KG/M2 | RESPIRATION RATE: 14 BRPM | WEIGHT: 290 LBS | HEIGHT: 68 IN

## 2021-05-10 DIAGNOSIS — G89.29 CHRONIC RIGHT-SIDED LOW BACK PAIN WITH RIGHT-SIDED SCIATICA: ICD-10-CM

## 2021-05-10 DIAGNOSIS — R10.9 RIGHT FLANK PAIN: Primary | ICD-10-CM

## 2021-05-10 DIAGNOSIS — R94.4 DECREASED GFR: ICD-10-CM

## 2021-05-10 DIAGNOSIS — M54.41 CHRONIC RIGHT-SIDED LOW BACK PAIN WITH RIGHT-SIDED SCIATICA: ICD-10-CM

## 2021-05-10 PROCEDURE — 1123F ACP DISCUSS/DSCN MKR DOCD: CPT | Performed by: INTERNAL MEDICINE

## 2021-05-10 PROCEDURE — 99213 OFFICE O/P EST LOW 20 MIN: CPT | Performed by: INTERNAL MEDICINE

## 2021-05-10 PROCEDURE — G8417 CALC BMI ABV UP PARAM F/U: HCPCS | Performed by: INTERNAL MEDICINE

## 2021-05-10 PROCEDURE — G8427 DOCREV CUR MEDS BY ELIG CLIN: HCPCS | Performed by: INTERNAL MEDICINE

## 2021-05-10 PROCEDURE — 4040F PNEUMOC VAC/ADMIN/RCVD: CPT | Performed by: INTERNAL MEDICINE

## 2021-05-10 PROCEDURE — 3017F COLORECTAL CA SCREEN DOC REV: CPT | Performed by: INTERNAL MEDICINE

## 2021-05-10 PROCEDURE — 1036F TOBACCO NON-USER: CPT | Performed by: INTERNAL MEDICINE

## 2021-05-10 RX ORDER — DULOXETIN HYDROCHLORIDE 30 MG/1
30 CAPSULE, DELAYED RELEASE ORAL DAILY
Qty: 30 CAPSULE | Refills: 0 | Status: SHIPPED
Start: 2021-05-10 | End: 2021-05-28 | Stop reason: DRUGHIGH

## 2021-05-10 ASSESSMENT — ENCOUNTER SYMPTOMS
SHORTNESS OF BREATH: 0
VOMITING: 0
BACK PAIN: 1
NAUSEA: 0
COUGH: 0

## 2021-05-10 NOTE — PROGRESS NOTES
5/10/2021    This is a 66 y.o. male   Chief Complaint   Patient presents with    Follow-up     back pain fu. pt says pain is not better. tizanidine is not working. Kali Pizarro HPI    Fu back pain  No better at all  Saw dr Sharmila Solano with [facility]   He ordered a mri    Stiffness in the low back  Pain is lateral  To the spine   Had surgery in  2017 on low back with Dr. Yoana Dean  Had mri 2018 lumbar spine. Eating low carbs  Lost 14 lbs     Tried tizanidine and it did not help  Tried gabapentin and that did not help      Had covid vaccine  Never had covid    Review of Systems   Constitutional: Positive for unexpected weight change. Respiratory: Negative for cough and shortness of breath. Gastrointestinal: Negative for nausea and vomiting. Musculoskeletal: Positive for back pain. Trouble walking a long distance. Past Medical History:   Diagnosis Date    Allergic rhinitis, seasonal     Bacterial meningitis     HX OF     Congenital absence of kidney     born with one kidney    Diverticulitis, colon     Gout     Gynecomastia     Hx of blood clots     postop knee replacement    Hydrocele, left     Lumbar disc disorder     Spermatocele     LEFT    Talipes cavus     Tinnitus     Wears contact lenses        Prior to Visit Medications    Medication Sig Taking? Authorizing Provider   tiZANidine (ZANAFLEX) 2 MG tablet Take 1 tablet by mouth every 6 hours as needed (muscle spams) Yes Bret Bauman MD   levothyroxine (SYNTHROID) 150 MCG tablet TAKE ONE TABLET BY MOUTH DAILY Yes Bret Bauman MD   losartan-hydroCHLOROthiazide (HYZAAR) 50-12.5 MG per tablet TAKE ONE TABLET BY MOUTH DAILY Yes Bret Bauman MD   sildenafil (REVATIO) 20 MG tablet TAKE 1 TO 2 PILLS BEFORE RELATIONS Yes Bret Bauman MD   Multiple Vitamins-Minerals (MULTIVITAL) TABS Take 1 tablet by mouth daily.  Yes Historical Provider, MD       Past Surgical History:   Procedure Laterality Date    BREAST LUMPECTOMY Left 08/12/2014    lipoma    BREAST SURGERY Right 2/4/15    removal of lipoma right breast.    CHOLECYSTECTOMY      COLECTOMY  1996    partial for diverticulitis    COLONOSCOPY      HYDROCELE EXCISION      and spermatacele excision    KNEE ARTHROSCOPY  9/12    left --dr Anthony Davenport HISTORY  10/21/2013    BILATERAL DECOMPRESSIVE LUMBAR LAMINECTOMY L4-L5, POSSIBLE    SPINAL CORD STIMULATOR IMPLANTATION N/A 4/8/2019    SPINAL CORD STIMULATOR  TRIAL WITH NEVRO USING FLUOROSCOPY performed by Brandy Gutierrez MD at [facility] Left 5/17/13    [address] [facility]        Social History     Socioeconomic History    Marital status:      Spouse name: Berenice Barton Number of children: 3    Years of education: Not on file    Highest education level: Not on file   Occupational History    Occupation: Retired--Construction    Social Needs    Financial resource strain: Not hard at all   Fei-David insecurity     Worry: Never true     Inability: Never true   [employer] needs     Medical: No     Non-medical: No   Tobacco Use    Smoking status: Never Smoker    Smokeless tobacco: Never Used    Tobacco comment: counseled on tobacco exposure avoidance   Substance and Sexual Activity    Alcohol use: Not Currently     Alcohol/week: 0.0 standard drinks     Frequency: Never    Drug use: No    Sexual activity: Yes     Partners: Female   Lifestyle    Physical activity     Days per week: Not on file     Minutes per session: Not on file    Stress: Not on file   Relationships    Social connections     Talks on phone: Not on file     Gets together: Not on file     Attends Jewish service: Not on file     Active member of club or organization: Not on file     Attends meetings of clubs or organizations: Not on file     Relationship status: Not on file    Intimate partner violence     Fear of current or ex partner: Not on file     Emotionally abused: Not on file     Physically abused: Not on file     Forced sexual activity: Not on file   Other Topics Concern    Not on file   Social History Narrative    Not on file       Family History   Problem Relation Age of Onset    Cancer Mother 59        LUNG    Other Father 72        AORTIC ANEURYSM    Cancer Sister         BREAST     Other Brother         HIV    Arthritis Other     Kidney Disease Other        Current Outpatient Medications   Medication Sig Dispense Refill    tiZANidine (ZANAFLEX) 2 MG tablet Take 1 tablet by mouth every 6 hours as needed (muscle spams) 30 tablet 0    levothyroxine (SYNTHROID) 150 MCG tablet TAKE ONE TABLET BY MOUTH DAILY 90 tablet 1    losartan-hydroCHLOROthiazide (HYZAAR) 50-12.5 MG per tablet TAKE ONE TABLET BY MOUTH DAILY 90 tablet 1    sildenafil (REVATIO) 20 MG tablet TAKE 1 TO 2 PILLS BEFORE RELATIONS 30 tablet 0    Multiple Vitamins-Minerals (MULTIVITAL) TABS Take 1 tablet by mouth daily. No current facility-administered medications for this visit. Allergies   Allergen Reactions    No Known Allergies        /68   Pulse 93   Resp 14   Ht 5' 8\" (1.727 m)   Wt 290 lb (131.5 kg)   SpO2 95%   BMI 44.09 kg/m²     Physical Exam  Constitutional:       Appearance: He is obese. Pulmonary:      Effort: Pulmonary effort is normal.   Musculoskeletal:      Comments: Large scar lower spine  Non tender in the throacic and lumbar spine  Pain is located lateral to the right lower thoracic spine   Neurological:      Mental Status: He is alert. Psychiatric:         Mood and Affect: Mood normal.         Behavior: Behavior normal.         Thought Content:  Thought content normal.         Judgment: Judgment normal.         Wt Readings from Last 3 Encounters:   05/10/21 290 lb (131.5 kg)   04/12/21 (!) 302 lb 9.6 oz (137.3 kg)   01/26/21 (!) 304 lb 12.8 oz (138.3 kg)       BP Readings from Last 3 Encounters:   05/10/21 128/68 04/12/21 (!) 154/82   01/26/21 132/76         Plan  Jackelin Poole was seen today for follow-up. Diagnoses and all orders for this visit:    Right flank pain  -     US RENAL COMPLETE; Future    Decreased GFR  -     Renal Function Panel; Future  -     Microalbumin / Creatinine Urine Ratio; Future    Chronic right-sided low back pain with right-sided sciatica    Other orders  -     DULoxetine (CYMBALTA) 30 MG extended release capsule; Take 1 capsule by mouth daily    will try cymbalta for pain  Will ck renal us for the pain  Neurosurgery is getting an mri lumbar spine  Avoid nsaids due to kidney  Was told the left kidney was non funtion.   His sister needed a new kidney and he could not be a doner

## 2021-05-12 ENCOUNTER — HOSPITAL ENCOUNTER (OUTPATIENT)
Dept: MRI IMAGING | Age: 71
Discharge: HOME | End: 2021-05-12
Payer: MEDICARE

## 2021-05-12 DIAGNOSIS — M54.16 LUMBAR RADICULOPATHY: ICD-10-CM

## 2021-05-12 DIAGNOSIS — R94.4 DECREASED GFR: ICD-10-CM

## 2021-05-12 PROCEDURE — 6360000004 HC RX CONTRAST MEDICATION: Performed by: NEUROLOGICAL SURGERY

## 2021-05-12 PROCEDURE — A9577 INJ MULTIHANCE: HCPCS | Performed by: NEUROLOGICAL SURGERY

## 2021-05-12 PROCEDURE — 72158 MRI LUMBAR SPINE W/O & W/DYE: CPT

## 2021-05-12 RX ADMIN — GADOBENATE DIMEGLUMINE 20 ML: 529 INJECTION, SOLUTION INTRAVENOUS at 15:50

## 2021-05-13 ENCOUNTER — HOSPITAL ENCOUNTER (OUTPATIENT)
Dept: ULTRASOUND IMAGING | Age: 71
Discharge: HOME | End: 2021-05-13
Payer: MEDICARE

## 2021-05-13 DIAGNOSIS — R10.9 RIGHT FLANK PAIN: ICD-10-CM

## 2021-05-13 LAB
ALBUMIN SERPL-MCNC: 4.4 G/DL (ref 3.4–5)
ANION GAP SERPL CALCULATED.3IONS-SCNC: 12 MMOL/L (ref 3–16)
BUN BLDV-MCNC: 20 MG/DL (ref 7–20)
CALCIUM SERPL-MCNC: 9.4 MG/DL (ref 8.3–10.6)
CHLORIDE BLD-SCNC: 101 MMOL/L (ref 99–110)
CO2: 25 MMOL/L (ref 21–32)
CREAT SERPL-MCNC: 1.1 MG/DL (ref 0.8–1.3)
CREATININE URINE: 59 MG/DL (ref 39–259)
GFR AFRICAN AMERICAN: >60
GFR NON-AFRICAN AMERICAN: >60
GLUCOSE BLD-MCNC: 110 MG/DL (ref 70–99)
MICROALBUMIN UR-MCNC: <1.2 MG/DL
MICROALBUMIN/CREAT UR-RTO: NORMAL MG/G (ref 0–30)
PHOSPHORUS: 3.6 MG/DL (ref 2.5–4.9)
POTASSIUM SERPL-SCNC: 4.3 MMOL/L (ref 3.5–5.1)
SODIUM BLD-SCNC: 138 MMOL/L (ref 136–145)

## 2021-05-13 PROCEDURE — 76770 US EXAM ABDO BACK WALL COMP: CPT

## 2021-05-28 ENCOUNTER — TELEPHONE (OUTPATIENT)
Dept: FAMILY MEDICINE CLINIC | Age: 71
End: 2021-05-28

## 2021-05-28 RX ORDER — DULOXETIN HYDROCHLORIDE 20 MG/1
20 CAPSULE, DELAYED RELEASE ORAL DAILY
Qty: 30 CAPSULE | Refills: 1 | Status: SHIPPED | OUTPATIENT
Start: 2021-05-28 | End: 2021-08-30

## 2021-05-28 NOTE — TELEPHONE ENCOUNTER
This can be a side effect with duloxetine but should improve with time. I am sending in a lower 20 mg dose for him to start taking to replace the 30 mg dose, can follow-up with Dr. Caroline Garcia as scheduled.

## 2021-05-28 NOTE — TELEPHONE ENCOUNTER
Patient called stating received duloxetine making him dizzy   Patient started taking this medication on 5/10. Dizziness is mostly in the morning, day goes on gets better. Patient would like to know if dosage could be lowered.      Please advise

## 2021-05-28 NOTE — TELEPHONE ENCOUNTER
Patient calling wanting to know when his last tetanus shot was, and when he is due for this   Please advise

## 2021-07-27 ENCOUNTER — OFFICE VISIT (OUTPATIENT)
Dept: FAMILY MEDICINE CLINIC | Age: 71
End: 2021-07-27
Payer: MEDICARE

## 2021-07-27 VITALS
SYSTOLIC BLOOD PRESSURE: 120 MMHG | OXYGEN SATURATION: 98 % | DIASTOLIC BLOOD PRESSURE: 70 MMHG | BODY MASS INDEX: 43.35 KG/M2 | RESPIRATION RATE: 12 BRPM | HEIGHT: 68 IN | WEIGHT: 286 LBS | HEART RATE: 76 BPM

## 2021-07-27 DIAGNOSIS — G89.29 CHRONIC RIGHT-SIDED LOW BACK PAIN WITH RIGHT-SIDED SCIATICA: Primary | ICD-10-CM

## 2021-07-27 DIAGNOSIS — I10 ESSENTIAL HYPERTENSION: ICD-10-CM

## 2021-07-27 DIAGNOSIS — R73.03 PREDIABETES: ICD-10-CM

## 2021-07-27 DIAGNOSIS — M54.41 CHRONIC RIGHT-SIDED LOW BACK PAIN WITH RIGHT-SIDED SCIATICA: Primary | ICD-10-CM

## 2021-07-27 LAB
ALBUMIN SERPL-MCNC: 4.3 G/DL (ref 3.4–5)
ANION GAP SERPL CALCULATED.3IONS-SCNC: 11 MMOL/L (ref 3–16)
BUN BLDV-MCNC: 17 MG/DL (ref 7–20)
CALCIUM SERPL-MCNC: 8.9 MG/DL (ref 8.3–10.6)
CHLORIDE BLD-SCNC: 103 MMOL/L (ref 99–110)
CO2: 27 MMOL/L (ref 21–32)
CREAT SERPL-MCNC: 0.9 MG/DL (ref 0.8–1.3)
GFR AFRICAN AMERICAN: >60
GFR NON-AFRICAN AMERICAN: >60
GLUCOSE BLD-MCNC: 126 MG/DL (ref 70–99)
HBA1C MFR BLD: 5.3 %
PHOSPHORUS: 3.4 MG/DL (ref 2.5–4.9)
POTASSIUM SERPL-SCNC: 4.3 MMOL/L (ref 3.5–5.1)
SODIUM BLD-SCNC: 141 MMOL/L (ref 136–145)

## 2021-07-27 PROCEDURE — 3017F COLORECTAL CA SCREEN DOC REV: CPT | Performed by: INTERNAL MEDICINE

## 2021-07-27 PROCEDURE — 1036F TOBACCO NON-USER: CPT | Performed by: INTERNAL MEDICINE

## 2021-07-27 PROCEDURE — 83036 HEMOGLOBIN GLYCOSYLATED A1C: CPT | Performed by: INTERNAL MEDICINE

## 2021-07-27 PROCEDURE — 4040F PNEUMOC VAC/ADMIN/RCVD: CPT | Performed by: INTERNAL MEDICINE

## 2021-07-27 PROCEDURE — 99213 OFFICE O/P EST LOW 20 MIN: CPT | Performed by: INTERNAL MEDICINE

## 2021-07-27 PROCEDURE — G8417 CALC BMI ABV UP PARAM F/U: HCPCS | Performed by: INTERNAL MEDICINE

## 2021-07-27 PROCEDURE — G8427 DOCREV CUR MEDS BY ELIG CLIN: HCPCS | Performed by: INTERNAL MEDICINE

## 2021-07-27 PROCEDURE — 1123F ACP DISCUSS/DSCN MKR DOCD: CPT | Performed by: INTERNAL MEDICINE

## 2021-07-27 ASSESSMENT — ENCOUNTER SYMPTOMS: BACK PAIN: 1

## 2021-07-27 NOTE — PROGRESS NOTES
Kayy Purcell (:  1950) is a 66 y.o. male,Established patient, here for evaluation of the following chief complaint(s):  Hypertension         ASSESSMENT/PLAN:       Gabriella Kelly was seen today for hypertension. Diagnoses and all orders for this visit:    Chronic right-sided low back pain with right-sided sciatica    Essential hypertension  -     Renal Function Panel; Future    Prediabetes  -     POCT glycosylated hemoglobin (Hb A1C)    Doing well  hga1c 5.3     bp is great.       SUBJECTIVE/OBJECTIVE:  HPI  Saw stacy for his back  Did PT helped tremendously  Continue exercise  Still has some back pain  On cymbalta  20 mg a day  30 mg made him dizzy  Helping with pain    htn  bp is great   Taking hyzaar    Prediabetic  Just spent 2 weeks in [facility]  hga1c  5.3 today  Lab Results   Component Value Date    LABA1C 5.8 2021    LABA1C 5.6 2020    LABA1C 5.5 2019    LABMICR <1.20 2021    LABMICR Not Indicated 06/10/2016       Lab Results   Component Value Date     2021     2021     2021    K 4.3 2021    K 4.1 2021    K 4.2 2021     2021     2021     2021    CO2 25 2021    CO2 23 2021    CO2 26 2021    BUN 20 2021    BUN 17 2021    BUN 14 2021    CREATININE 1.1 2021    CREATININE 1.2 2021    CREATININE 1.2 2021    GLUCOSE 110 (H) 2021    GLUCOSE 150 (H) 2021    GLUCOSE 135 (H) 2021    CALCIUM 9.4 2021    CALCIUM 8.4 2021    CALCIUM 9.3 2021       Lab Results   Component Value Date    CHOL 125 2021    CHOL 146 2020    CHOL 137 2019    TRIG 167 (H) 2021    TRIG 145 2020    TRIG 134 2019    HDL 44 2021    HDL 44 2020    HDL 53 2019    LDLCALC 48 2021    LDLCALC 73 2020    LDLCALC 57 2019       Lab Results   Component Value Date    ALT 20 2021 ALT 19 02/18/2020    ALT 18 02/12/2019    AST 18 01/26/2021    AST 17 02/18/2020    AST 17 02/12/2019       Lab Results   Component Value Date    TSH 2.49 04/16/2019    TSH 4.28 (H) 02/18/2019    TSH 2.27 02/06/2018    T4FREE 1.5 01/26/2021    T4FREE 1.2 02/12/2019    T4FREE 1.1 09/21/2017       Lab Results   Component Value Date    WBC 4.7 04/08/2019    WBC 9.0 02/18/2019    WBC 6.5 05/29/2018    HGB 14.1 04/08/2019    HGB 14.9 02/18/2019    HGB 15.2 05/29/2018    HCT 41.0 04/08/2019    HCT 43.3 02/18/2019    HCT 44.5 05/29/2018    MCV 94.5 04/08/2019    MCV 95.8 02/18/2019    MCV 94.9 05/29/2018     04/08/2019     02/18/2019     05/29/2018       Lab Results   Component Value Date    INR 1.04 04/08/2019    INR 1.01 01/09/2018    INR 1.01 10/14/2013        Lab Results   Component Value Date    PSA 2.33 04/27/2015    PSA 3.27 04/24/2014    PSA 3.13 06/18/2012        Lab Results   Component Value Date    LABURIC 5.5 09/21/2017    LABURIC 5.0 10/28/2016    LABURIC 4.9 04/29/2016        Review of Systems   Constitutional: Positive for unexpected weight change (lost   4 lbs). Negative for appetite change. Musculoskeletal: Positive for back pain (improved). Negative for gait problem. Objective   Physical Exam  Constitutional:       Appearance: Normal appearance. He is well-developed. He is obese. HENT:      Head: Normocephalic and atraumatic. Cardiovascular:      Rate and Rhythm: Normal rate and regular rhythm. Pulses: Normal pulses. Heart sounds: No murmur heard. Pulmonary:      Effort: Pulmonary effort is normal.      Breath sounds: Normal breath sounds. No wheezing. Abdominal:      Palpations: Abdomen is soft. Tenderness: There is no abdominal tenderness. Skin:     General: Skin is warm and dry. Neurological:      Mental Status: He is alert.    Psychiatric:         Mood and Affect: Mood normal.         Behavior: Behavior normal.         Thought Content: Thought content normal.         Judgment: Judgment normal.            An electronic signature was used to authenticate this note.     --Particalfreda Richmond MD

## 2021-08-30 RX ORDER — SILDENAFIL CITRATE 20 MG/1
TABLET ORAL
Qty: 30 TABLET | Refills: 0 | Status: SHIPPED | OUTPATIENT
Start: 2021-08-30 | End: 2022-02-07 | Stop reason: SDUPTHER

## 2021-10-04 ENCOUNTER — OFFICE VISIT (OUTPATIENT)
Dept: FAMILY MEDICINE CLINIC | Age: 71
End: 2021-10-04
Payer: MEDICARE

## 2021-10-04 VITALS
WEIGHT: 282 LBS | SYSTOLIC BLOOD PRESSURE: 132 MMHG | HEIGHT: 68 IN | OXYGEN SATURATION: 94 % | TEMPERATURE: 97.7 F | HEART RATE: 83 BPM | BODY MASS INDEX: 42.74 KG/M2 | DIASTOLIC BLOOD PRESSURE: 78 MMHG

## 2021-10-04 DIAGNOSIS — Z00.00 ENCOUNTER FOR MEDICARE ANNUAL WELLNESS EXAM: Primary | ICD-10-CM

## 2021-10-04 DIAGNOSIS — Z00.00 ROUTINE GENERAL MEDICAL EXAMINATION AT A HEALTH CARE FACILITY: ICD-10-CM

## 2021-10-04 PROCEDURE — G8484 FLU IMMUNIZE NO ADMIN: HCPCS | Performed by: INTERNAL MEDICINE

## 2021-10-04 PROCEDURE — 4040F PNEUMOC VAC/ADMIN/RCVD: CPT | Performed by: INTERNAL MEDICINE

## 2021-10-04 PROCEDURE — 3017F COLORECTAL CA SCREEN DOC REV: CPT | Performed by: INTERNAL MEDICINE

## 2021-10-04 PROCEDURE — G0008 ADMIN INFLUENZA VIRUS VAC: HCPCS | Performed by: INTERNAL MEDICINE

## 2021-10-04 PROCEDURE — G0438 PPPS, INITIAL VISIT: HCPCS | Performed by: INTERNAL MEDICINE

## 2021-10-04 PROCEDURE — 90694 VACC AIIV4 NO PRSRV 0.5ML IM: CPT | Performed by: INTERNAL MEDICINE

## 2021-10-04 PROCEDURE — 1123F ACP DISCUSS/DSCN MKR DOCD: CPT | Performed by: INTERNAL MEDICINE

## 2021-10-04 ASSESSMENT — LIFESTYLE VARIABLES
HOW OFTEN DO YOU HAVE A DRINK CONTAINING ALCOHOL: 2
HOW MANY STANDARD DRINKS CONTAINING ALCOHOL DO YOU HAVE ON A TYPICAL DAY: 0
HAS A RELATIVE, FRIEND, DOCTOR, OR ANOTHER HEALTH PROFESSIONAL EXPRESSED CONCERN ABOUT YOUR DRINKING OR SUGGESTED YOU CUT DOWN: 0
HOW OFTEN DURING THE LAST YEAR HAVE YOU FAILED TO DO WHAT WAS NORMALLY EXPECTED FROM YOU BECAUSE OF DRINKING: 0
HOW OFTEN DURING THE LAST YEAR HAVE YOU FOUND THAT YOU WERE NOT ABLE TO STOP DRINKING ONCE YOU HAD STARTED: 0
AUDIT TOTAL SCORE: 2
HOW OFTEN DURING THE LAST YEAR HAVE YOU HAD A FEELING OF GUILT OR REMORSE AFTER DRINKING: 0
HOW OFTEN DO YOU HAVE SIX OR MORE DRINKS ON ONE OCCASION: 0
HOW OFTEN DURING THE LAST YEAR HAVE YOU BEEN UNABLE TO REMEMBER WHAT HAPPENED THE NIGHT BEFORE BECAUSE YOU HAD BEEN DRINKING: 0
HOW OFTEN DURING THE LAST YEAR HAVE YOU NEEDED AN ALCOHOLIC DRINK FIRST THING IN THE MORNING TO GET YOURSELF GOING AFTER A NIGHT OF HEAVY DRINKING: 0
AUDIT-C TOTAL SCORE: 2
HAVE YOU OR SOMEONE ELSE BEEN INJURED AS A RESULT OF YOUR DRINKING: 0

## 2021-10-04 ASSESSMENT — PATIENT HEALTH QUESTIONNAIRE - PHQ9
SUM OF ALL RESPONSES TO PHQ QUESTIONS 1-9: 0
SUM OF ALL RESPONSES TO PHQ QUESTIONS 1-9: 0
1. LITTLE INTEREST OR PLEASURE IN DOING THINGS: 0
SUM OF ALL RESPONSES TO PHQ9 QUESTIONS 1 & 2: 0
SUM OF ALL RESPONSES TO PHQ QUESTIONS 1-9: 0
2. FEELING DOWN, DEPRESSED OR HOPELESS: 0

## 2021-10-04 NOTE — PROGRESS NOTES
ARTHROSCOPY  9/12    left --dr Edita Silva HISTORY  10/21/2013    BILATERAL DECOMPRESSIVE LUMBAR LAMINECTOMY L4-L5, POSSIBLE    SPINAL CORD STIMULATOR IMPLANTATION N/A 4/8/2019    SPINAL CORD STIMULATOR  TRIAL WITH BRYN USING FLUOROSCOPY performed by Eleno Read MD at [address] [facility] [address] Left 5/17/13    [facility]        Family History   Problem Relation Age of Onset    Cancer Mother 59        LUNG    Other Father 72        AORTIC ANEURYSM    Cancer Sister         BREAST     Other Brother         HIV    Arthritis Other     Kidney Disease Other        CareTeam (Including outside providers/suppliers regularly involved in providing care):   Patient Care Team:  Andres Valdez MD as PCP - General (Internal Medicine)  Andres Valdez MD as PCP - [address] [place] [address] Dr Beach Provider    Wt Readings from Last 3 Encounters:   10/04/21 282 lb (127.9 kg)   07/27/21 286 lb (129.7 kg)   05/10/21 290 lb (131.5 kg)     Vitals:    10/04/21 1007   BP: 132/78   Site: Right Upper Arm   Position: Sitting   Cuff Size: Medium Adult   Pulse: 83   Temp: 97.7 °F (36.5 °C)   SpO2: 94%   Weight: 282 lb (127.9 kg)   Height: 5' 7.5\" (1.715 m)     Body mass index is 43.52 kg/m². Based upon direct observation of the patient, evaluation of cognition reveals recent and remote memory intact. Patient's complete Health Risk Assessment and screening values have been reviewed and are found in Flowsheets. The following problems were reviewed today and where indicated follow up appointments were made and/or referrals ordered. Positive Risk Factor Screenings with Interventions:          General Health and ACP:  General  In general, how would you say your health is?: Very Good  In the past 7 days, have you experienced any of the following?  New or Increased Pain, New or Increased Fatigue, Loneliness, Social Isolation, Stress or Anger?: None of These  Do you get the social and emotional support that you need?: Yes  Do you have a Living Will?: (!) No  Advance Directives     Power of  Living Will ACP-Advance Directive ACP-Power of     Not on File Filed on 10/24/13 Filed Not on File      General Health Risk Interventions:  · Recommend maurice care power of     Health Habits/Nutrition:  Health Habits/Nutrition  Do you exercise for at least 20 minutes 2-3 times per week?: (!) No  Have you lost any weight without trying in the past 3 months?: No  Do you eat only one meal per day?: No  Have you seen the dentist within the past year?: Yes  Body mass index: (!) 43.51  Health Habits/Nutrition Interventions:  · Not in the routine   · Exercise for back   3 times a week  · Stretching   · Encouraged walking    Hearing/Vision:  No exam data present  Hearing/Vision  Do you or your family notice any trouble with your hearing that hasn't been managed with hearing aids?: (!) Yes  Do you have difficulty driving, watching TV, or doing any of your daily activities because of your eyesight?: No  Have you had an eye exam within the past year?: Yes  Hearing/Vision Interventions:  · Had hearing aids 10-12 yrs ago  · Says his problem is background noise. Safety:  Safety  Do you have working smoke detectors?: Yes  Have all throw rugs been removed or fastened?: (!) No  Do you have non-slip mats or surfaces in all bathtubs/showers?: Yes  Do all of your stairways have a railing or banister?: Yes  Are your doorways, halls and stairs free of clutter?: Yes  Do you always fasten your seatbelt when you are in a car?: Yes  Safety Interventions:  · Has one at front door.        Personalized Preventive Plan   Current Health Maintenance Status  Immunization History   Administered Date(s) Administered    COVID-19, [employer], PF, 30mcg/0.3mL 02/06/2021, 02/27/2021    Influenza, High Dose (Fluzone 65 yrs and older) 10/27/2014, 10/27/2015, 10/28/2016, 09/27/2017, 11/14/2018    Influenza, Quadv, adjuvanted, 65 yrs +, IM, PF (Fluad) 11/12/2020    Influenza, Triv, inactivated, subunit, adjuvanted, IM (Fluad 65 yrs and older) 01/22/2020    Pneumococcal Conjugate 13-valent ([id number]) 04/27/2015    Pneumococcal Polysaccharide ([id number]) 04/24/2014    Tdap (Boostrix, Adacel) 09/27/2005, 10/27/2014    Zoster Recombinant (Shingrix) 11/12/2020, 01/26/2021        Health Maintenance   Topic Date Due    Annual Wellness Visit (AWV)  Never done    Flu vaccine (1) 09/01/2021    TSH testing  04/06/2022    Potassium monitoring  07/27/2022    Creatinine monitoring  07/27/2022    Colon cancer screen colonoscopy  03/02/2024    DTaP/Tdap/Td vaccine (3 - Td or Tdap) 10/27/2024    Lipid screen  01/26/2026    Shingles Vaccine  Completed    Pneumococcal 65+ years Vaccine  Completed    COVID-19 Vaccine  Completed    Hepatitis C screen  Completed    Hepatitis A vaccine  Aged Out    Hepatitis B vaccine  Aged Out    Hib vaccine  Aged Out    Meningococcal (ACWY) vaccine  Aged Out     Recommendations for vitalclip Due: see orders and patient instructions/AVS.  . Recommended screening schedule for the next 5-10 years is provided to the patient in written form: see Patient Instructions/AVS.       Julius Rankin was seen today for medicare awv.     Diagnoses and all orders for this visit:    Encounter for Medicare annual wellness exam    Doing well.  cymbalta 20 mg helping with pain

## 2021-12-07 ENCOUNTER — HOSPITAL ENCOUNTER (OUTPATIENT)
Dept: CT IMAGING | Age: 71
Discharge: HOME | End: 2021-12-07
Payer: MEDICARE

## 2021-12-07 ENCOUNTER — OFFICE VISIT (OUTPATIENT)
Dept: FAMILY MEDICINE CLINIC | Age: 71
End: 2021-12-07
Payer: MEDICARE

## 2021-12-07 VITALS
DIASTOLIC BLOOD PRESSURE: 82 MMHG | BODY MASS INDEX: 42.74 KG/M2 | OXYGEN SATURATION: 95 % | HEIGHT: 68 IN | SYSTOLIC BLOOD PRESSURE: 130 MMHG | HEART RATE: 81 BPM | WEIGHT: 282 LBS | RESPIRATION RATE: 18 BRPM

## 2021-12-07 DIAGNOSIS — R31.9 HEMATURIA, UNSPECIFIED TYPE: Primary | ICD-10-CM

## 2021-12-07 DIAGNOSIS — R31.9 HEMATURIA, UNSPECIFIED TYPE: ICD-10-CM

## 2021-12-07 DIAGNOSIS — R73.03 PREDIABETES: ICD-10-CM

## 2021-12-07 DIAGNOSIS — E87.5 HYPERKALEMIA: Primary | ICD-10-CM

## 2021-12-07 LAB
A/G RATIO: 1.5 (ref 1.1–2.2)
ALBUMIN SERPL-MCNC: 4.2 G/DL (ref 3.4–5)
ALP BLD-CCNC: 64 U/L (ref 40–129)
ALT SERPL-CCNC: 23 U/L (ref 10–40)
ANION GAP SERPL CALCULATED.3IONS-SCNC: 13 MMOL/L (ref 3–16)
AST SERPL-CCNC: 27 U/L (ref 15–37)
BASOPHILS ABSOLUTE: 0 K/UL (ref 0–0.2)
BASOPHILS RELATIVE PERCENT: 0.8 %
BILIRUB SERPL-MCNC: 0.4 MG/DL (ref 0–1)
BUN BLDV-MCNC: 17 MG/DL (ref 7–20)
CALCIUM SERPL-MCNC: 9.1 MG/DL (ref 8.3–10.6)
CHLORIDE BLD-SCNC: 101 MMOL/L (ref 99–110)
CO2: 25 MMOL/L (ref 21–32)
CREAT SERPL-MCNC: 1.1 MG/DL (ref 0.8–1.3)
EOSINOPHILS ABSOLUTE: 0.2 K/UL (ref 0–0.6)
EOSINOPHILS RELATIVE PERCENT: 2.9 %
GFR AFRICAN AMERICAN: >60
GFR NON-AFRICAN AMERICAN: >60
GLUCOSE BLD-MCNC: 139 MG/DL (ref 70–99)
HCT VFR BLD CALC: 43 % (ref 40.5–52.5)
HEMOGLOBIN: 14.3 G/DL (ref 13.5–17.5)
LYMPHOCYTES ABSOLUTE: 1.8 K/UL (ref 1–5.1)
LYMPHOCYTES RELATIVE PERCENT: 30.3 %
MCH RBC QN AUTO: 32.7 PG (ref 26–34)
MCHC RBC AUTO-ENTMCNC: 33.3 G/DL (ref 31–36)
MCV RBC AUTO: 98.2 FL (ref 80–100)
MONOCYTES ABSOLUTE: 0.5 K/UL (ref 0–1.3)
MONOCYTES RELATIVE PERCENT: 8.2 %
NEUTROPHILS ABSOLUTE: 3.4 K/UL (ref 1.7–7.7)
NEUTROPHILS RELATIVE PERCENT: 57.8 %
PDW BLD-RTO: 14 % (ref 12.4–15.4)
PLATELET # BLD: 178 K/UL (ref 135–450)
PMV BLD AUTO: 9.5 FL (ref 5–10.5)
POTASSIUM SERPL-SCNC: 5.2 MMOL/L (ref 3.5–5.1)
RBC # BLD: 4.38 M/UL (ref 4.2–5.9)
SODIUM BLD-SCNC: 139 MMOL/L (ref 136–145)
TOTAL PROTEIN: 7 G/DL (ref 6.4–8.2)
WBC # BLD: 5.8 K/UL (ref 4–11)

## 2021-12-07 PROCEDURE — 4040F PNEUMOC VAC/ADMIN/RCVD: CPT | Performed by: INTERNAL MEDICINE

## 2021-12-07 PROCEDURE — 3017F COLORECTAL CA SCREEN DOC REV: CPT | Performed by: INTERNAL MEDICINE

## 2021-12-07 PROCEDURE — 99214 OFFICE O/P EST MOD 30 MIN: CPT | Performed by: INTERNAL MEDICINE

## 2021-12-07 PROCEDURE — 1036F TOBACCO NON-USER: CPT | Performed by: INTERNAL MEDICINE

## 2021-12-07 PROCEDURE — 74176 CT ABD & PELVIS W/O CONTRAST: CPT

## 2021-12-07 PROCEDURE — G8427 DOCREV CUR MEDS BY ELIG CLIN: HCPCS | Performed by: INTERNAL MEDICINE

## 2021-12-07 PROCEDURE — 1123F ACP DISCUSS/DSCN MKR DOCD: CPT | Performed by: INTERNAL MEDICINE

## 2021-12-07 PROCEDURE — G8417 CALC BMI ABV UP PARAM F/U: HCPCS | Performed by: INTERNAL MEDICINE

## 2021-12-07 PROCEDURE — G8484 FLU IMMUNIZE NO ADMIN: HCPCS | Performed by: INTERNAL MEDICINE

## 2021-12-07 RX ORDER — CEPHALEXIN 500 MG/1
500 CAPSULE ORAL 2 TIMES DAILY
Qty: 20 CAPSULE | Refills: 0 | Status: SHIPPED | OUTPATIENT
Start: 2021-12-07 | End: 2021-12-17

## 2021-12-07 ASSESSMENT — ENCOUNTER SYMPTOMS
VOMITING: 0
NAUSEA: 0

## 2021-12-07 NOTE — PROGRESS NOTES
Head: Normocephalic and atraumatic. Cardiovascular:      Rate and Rhythm: Normal rate and regular rhythm. Heart sounds: Normal heart sounds. No murmur heard. Pulmonary:      Breath sounds: Normal breath sounds. No wheezing. Abdominal:      Palpations: Abdomen is soft. Tenderness: There is no abdominal tenderness. Skin:     General: Skin is warm and dry. Neurological:      Mental Status: He is alert. Psychiatric:         Mood and Affect: Mood normal.         Behavior: Behavior normal.         Thought Content:  Thought content normal.         Judgment: Judgment normal.                --Enio Oscar MD

## 2021-12-08 LAB — URINE CULTURE, ROUTINE: NORMAL

## 2021-12-10 DIAGNOSIS — E87.5 HYPERKALEMIA: ICD-10-CM

## 2021-12-10 DIAGNOSIS — R73.03 PREDIABETES: ICD-10-CM

## 2021-12-10 LAB
POTASSIUM SERPL-SCNC: 4.4 MMOL/L (ref 3.5–5.1)
PROSTATE SPECIFIC ANTIGEN: 2.91 NG/ML (ref 0–4)

## 2021-12-11 LAB
ESTIMATED AVERAGE GLUCOSE: 114 MG/DL
HBA1C MFR BLD: 5.6 %

## 2022-01-31 LAB
BASOPHILS ABSOLUTE: 0 K/UL (ref 0–0.2)
BASOPHILS RELATIVE PERCENT: 0.6 %
EOSINOPHILS ABSOLUTE: 0.1 K/UL (ref 0–0.6)
EOSINOPHILS RELATIVE PERCENT: 0.9 %
HCT VFR BLD CALC: 42.2 % (ref 40.5–52.5)
HEMOGLOBIN: 14.2 G/DL (ref 13.5–17.5)
LYMPHOCYTES ABSOLUTE: 1.9 K/UL (ref 1–5.1)
LYMPHOCYTES RELATIVE PERCENT: 25.8 %
MCH RBC QN AUTO: 32.7 PG (ref 26–34)
MCHC RBC AUTO-ENTMCNC: 33.7 G/DL (ref 31–36)
MCV RBC AUTO: 97.1 FL (ref 80–100)
MONOCYTES ABSOLUTE: 0.6 K/UL (ref 0–1.3)
MONOCYTES RELATIVE PERCENT: 7.7 %
NEUTROPHILS ABSOLUTE: 4.8 K/UL (ref 1.7–7.7)
NEUTROPHILS RELATIVE PERCENT: 65 %
PDW BLD-RTO: 13.6 % (ref 12.4–15.4)
PLATELET # BLD: 194 K/UL (ref 135–450)
PMV BLD AUTO: 9.2 FL (ref 5–10.5)
RBC # BLD: 4.35 M/UL (ref 4.2–5.9)
WBC # BLD: 7.4 K/UL (ref 4–11)

## 2022-02-07 ENCOUNTER — OFFICE VISIT (OUTPATIENT)
Dept: FAMILY MEDICINE CLINIC | Age: 72
End: 2022-02-07
Payer: MEDICARE

## 2022-02-07 VITALS
OXYGEN SATURATION: 98 % | RESPIRATION RATE: 10 BRPM | DIASTOLIC BLOOD PRESSURE: 80 MMHG | SYSTOLIC BLOOD PRESSURE: 138 MMHG | WEIGHT: 292 LBS | HEART RATE: 86 BPM | BODY MASS INDEX: 45.06 KG/M2

## 2022-02-07 DIAGNOSIS — E03.8 OTHER SPECIFIED HYPOTHYROIDISM: ICD-10-CM

## 2022-02-07 DIAGNOSIS — M54.41 CHRONIC RIGHT-SIDED LOW BACK PAIN WITH RIGHT-SIDED SCIATICA: ICD-10-CM

## 2022-02-07 DIAGNOSIS — Z13.220 SCREENING FOR LIPID DISORDERS: ICD-10-CM

## 2022-02-07 DIAGNOSIS — G89.29 CHRONIC RIGHT-SIDED LOW BACK PAIN WITH RIGHT-SIDED SCIATICA: ICD-10-CM

## 2022-02-07 DIAGNOSIS — I82.4Z9 DEEP VEIN THROMBOSIS (DVT) OF DISTAL VEIN OF LOWER EXTREMITY, UNSPECIFIED CHRONICITY, UNSPECIFIED LATERALITY (HCC): ICD-10-CM

## 2022-02-07 DIAGNOSIS — R42 VERTIGO: ICD-10-CM

## 2022-02-07 DIAGNOSIS — I10 ESSENTIAL HYPERTENSION: ICD-10-CM

## 2022-02-07 DIAGNOSIS — R73.9 HYPERGLYCEMIA: Primary | ICD-10-CM

## 2022-02-07 LAB
CHOLESTEROL, TOTAL: 145 MG/DL (ref 0–199)
HDLC SERPL-MCNC: 44 MG/DL (ref 40–60)
LDL CHOLESTEROL CALCULATED: 71 MG/DL
TRIGL SERPL-MCNC: 150 MG/DL (ref 0–150)
TSH REFLEX: 1.71 UIU/ML (ref 0.27–4.2)
VLDLC SERPL CALC-MCNC: 30 MG/DL

## 2022-02-07 PROCEDURE — 99214 OFFICE O/P EST MOD 30 MIN: CPT | Performed by: INTERNAL MEDICINE

## 2022-02-07 PROCEDURE — 3017F COLORECTAL CA SCREEN DOC REV: CPT | Performed by: INTERNAL MEDICINE

## 2022-02-07 PROCEDURE — 1123F ACP DISCUSS/DSCN MKR DOCD: CPT | Performed by: INTERNAL MEDICINE

## 2022-02-07 PROCEDURE — G8484 FLU IMMUNIZE NO ADMIN: HCPCS | Performed by: INTERNAL MEDICINE

## 2022-02-07 PROCEDURE — 1036F TOBACCO NON-USER: CPT | Performed by: INTERNAL MEDICINE

## 2022-02-07 PROCEDURE — G8417 CALC BMI ABV UP PARAM F/U: HCPCS | Performed by: INTERNAL MEDICINE

## 2022-02-07 PROCEDURE — 4040F PNEUMOC VAC/ADMIN/RCVD: CPT | Performed by: INTERNAL MEDICINE

## 2022-02-07 PROCEDURE — G8427 DOCREV CUR MEDS BY ELIG CLIN: HCPCS | Performed by: INTERNAL MEDICINE

## 2022-02-07 RX ORDER — FINASTERIDE 5 MG/1
5 TABLET, FILM COATED ORAL DAILY
COMMUNITY
Start: 2022-02-01 | End: 2022-09-28

## 2022-02-07 RX ORDER — SILDENAFIL CITRATE 20 MG/1
TABLET ORAL
Qty: 30 TABLET | Refills: 0 | Status: SHIPPED | OUTPATIENT
Start: 2022-02-07 | End: 2022-08-17

## 2022-02-07 RX ORDER — MECLIZINE HYDROCHLORIDE 25 MG/1
25 TABLET ORAL 3 TIMES DAILY PRN
Qty: 15 TABLET | Refills: 0 | Status: SHIPPED | OUTPATIENT
Start: 2022-02-07 | End: 2022-02-22

## 2022-02-07 ASSESSMENT — PATIENT HEALTH QUESTIONNAIRE - PHQ9
SUM OF ALL RESPONSES TO PHQ QUESTIONS 1-9: 0
1. LITTLE INTEREST OR PLEASURE IN DOING THINGS: 0
SUM OF ALL RESPONSES TO PHQ9 QUESTIONS 1 & 2: 0
2. FEELING DOWN, DEPRESSED OR HOPELESS: 0
SUM OF ALL RESPONSES TO PHQ QUESTIONS 1-9: 0

## 2022-04-13 ENCOUNTER — HOSPITAL ENCOUNTER (OUTPATIENT)
Dept: CT IMAGING | Age: 72
Discharge: HOME | End: 2022-04-13
Payer: MEDICARE

## 2022-04-13 ENCOUNTER — HOSPITAL ENCOUNTER (OUTPATIENT)
Dept: MRI IMAGING | Age: 72
Discharge: HOME | End: 2022-04-13
Payer: MEDICARE

## 2022-04-13 DIAGNOSIS — M43.16 SPONDYLOLISTHESIS OF LUMBAR REGION: ICD-10-CM

## 2022-04-13 PROCEDURE — 72131 CT LUMBAR SPINE W/O DYE: CPT

## 2022-04-13 PROCEDURE — 72148 MRI LUMBAR SPINE W/O DYE: CPT

## 2022-06-09 RX ORDER — LOSARTAN POTASSIUM AND HYDROCHLOROTHIAZIDE 12.5; 5 MG/1; MG/1
TABLET ORAL
Qty: 90 TABLET | Refills: 1 | Status: SHIPPED | OUTPATIENT
Start: 2022-06-09

## 2022-06-15 ENCOUNTER — HOSPITAL ENCOUNTER (OUTPATIENT)
Dept: WOMENS IMAGING | Age: 72
Discharge: HOME | End: 2022-06-15
Payer: MEDICARE

## 2022-06-15 ENCOUNTER — HOSPITAL ENCOUNTER (OUTPATIENT)
Dept: MRI IMAGING | Age: 72
Discharge: HOME | End: 2022-06-15
Payer: MEDICARE

## 2022-06-15 ENCOUNTER — HOSPITAL ENCOUNTER (OUTPATIENT)
Dept: CT IMAGING | Age: 72
Discharge: HOME | End: 2022-06-15
Payer: MEDICARE

## 2022-06-15 DIAGNOSIS — M41.9 SCOLIOSIS, UNSPECIFIED SCOLIOSIS TYPE, UNSPECIFIED SPINAL REGION: ICD-10-CM

## 2022-06-15 DIAGNOSIS — M81.0 AGE RELATED OSTEOPOROSIS, UNSPECIFIED PATHOLOGICAL FRACTURE PRESENCE: ICD-10-CM

## 2022-06-15 PROCEDURE — 77080 DXA BONE DENSITY AXIAL: CPT

## 2022-06-15 PROCEDURE — 72146 MRI CHEST SPINE W/O DYE: CPT

## 2022-06-15 PROCEDURE — 72128 CT CHEST SPINE W/O DYE: CPT

## 2022-08-05 DIAGNOSIS — E03.8 OTHER SPECIFIED HYPOTHYROIDISM: ICD-10-CM

## 2022-08-05 RX ORDER — LEVOTHYROXINE SODIUM 0.15 MG/1
TABLET ORAL
Qty: 90 TABLET | Refills: 2 | Status: SHIPPED
Start: 2022-08-05 | End: 2022-08-17 | Stop reason: DRUGHIGH

## 2022-08-17 ENCOUNTER — OFFICE VISIT (OUTPATIENT)
Dept: FAMILY MEDICINE CLINIC | Age: 72
End: 2022-08-17
Payer: MEDICARE

## 2022-08-17 VITALS
SYSTOLIC BLOOD PRESSURE: 122 MMHG | HEART RATE: 99 BPM | RESPIRATION RATE: 12 BRPM | WEIGHT: 290 LBS | BODY MASS INDEX: 44.75 KG/M2 | DIASTOLIC BLOOD PRESSURE: 76 MMHG | OXYGEN SATURATION: 96 %

## 2022-08-17 DIAGNOSIS — R94.31 ABNORMAL EKG: ICD-10-CM

## 2022-08-17 DIAGNOSIS — I82.4Z9 DEEP VEIN THROMBOSIS (DVT) OF DISTAL VEIN OF LOWER EXTREMITY, UNSPECIFIED CHRONICITY, UNSPECIFIED LATERALITY (HCC): ICD-10-CM

## 2022-08-17 DIAGNOSIS — Z87.39 H/O: GOUT: ICD-10-CM

## 2022-08-17 DIAGNOSIS — U07.1 COVID-19: ICD-10-CM

## 2022-08-17 DIAGNOSIS — S06.5XAA SUBDURAL HEMATOMA: ICD-10-CM

## 2022-08-17 DIAGNOSIS — Z01.810 PREOP CARDIOVASCULAR EXAM: Primary | ICD-10-CM

## 2022-08-17 DIAGNOSIS — N20.0 NEPHROLITHIASIS: ICD-10-CM

## 2022-08-17 DIAGNOSIS — I10 ESSENTIAL HYPERTENSION: ICD-10-CM

## 2022-08-17 DIAGNOSIS — I75.022 PURPLE TOE SYNDROME OF LEFT FOOT (HCC): ICD-10-CM

## 2022-08-17 PROCEDURE — 1123F ACP DISCUSS/DSCN MKR DOCD: CPT | Performed by: INTERNAL MEDICINE

## 2022-08-17 PROCEDURE — 99214 OFFICE O/P EST MOD 30 MIN: CPT | Performed by: INTERNAL MEDICINE

## 2022-08-17 PROCEDURE — 93000 ELECTROCARDIOGRAM COMPLETE: CPT | Performed by: INTERNAL MEDICINE

## 2022-08-17 RX ORDER — LEVOTHYROXINE SODIUM 0.15 MG/1
150 TABLET ORAL DAILY
COMMUNITY

## 2022-08-17 SDOH — ECONOMIC STABILITY: FOOD INSECURITY: WITHIN THE PAST 12 MONTHS, THE FOOD YOU BOUGHT JUST DIDN'T LAST AND YOU DIDN'T HAVE MONEY TO GET MORE.: NEVER TRUE

## 2022-08-17 SDOH — ECONOMIC STABILITY: FOOD INSECURITY: WITHIN THE PAST 12 MONTHS, YOU WORRIED THAT YOUR FOOD WOULD RUN OUT BEFORE YOU GOT MONEY TO BUY MORE.: NEVER TRUE

## 2022-08-17 ASSESSMENT — ENCOUNTER SYMPTOMS
VOMITING: 0
NAUSEA: 0
CONSTIPATION: 0
SHORTNESS OF BREATH: 0
DIARRHEA: 0
COLOR CHANGE: 0
WHEEZING: 0
EYE PAIN: 0

## 2022-08-17 ASSESSMENT — SOCIAL DETERMINANTS OF HEALTH (SDOH): HOW HARD IS IT FOR YOU TO PAY FOR THE VERY BASICS LIKE FOOD, HOUSING, MEDICAL CARE, AND HEATING?: NOT HARD AT ALL

## 2022-08-17 NOTE — PROGRESS NOTES
PRE-OP [facility] PHYSICAL    8/17/2022    Jacquie Nugent is a 67 y.o. male who presents to the office today for apreoperative consultation. Chief Complaint   Patient presents with    Pre-op Exam     8/31 and 9/2 woth Dr Jose Pina 2: [id number] FIXATION AND FUSION, BILATERAL, OPEN SI FUSION, L2-S1 BILATERAL GRADE II FACET OSTEOTOMIES           Here for pre op.     Never has been dx with heart or lung disease  No palpitations , no chest pain or shortness of breath    No bleeding problems  No ho sleep apnea  Could walk 1/4 of a mile  - can't walk far due to back pain  Hiking in [place] several months ago  Going up and down stairs hurts back      Past Medical History:   Diagnosis Date    Allergic rhinitis, seasonal     Bacterial meningitis     HX OF     Congenital absence of kidney     born with one kidney    Diverticulitis, colon     Gout     Gynecomastia     Hx of blood clots     postop knee replacement    Hydrocele, left     Lumbar disc disorder     Spermatocele     LEFT    Talipes cavus     Tinnitus     Wears contact lenses      Past Surgical History:   Procedure Laterality Date    BREAST LUMPECTOMY Left 08/12/2014    lipoma    BREAST SURGERY Right 2/4/15    removal of lipoma right breast.    CHOLECYSTECTOMY      COLECTOMY  1996    partial for diverticulitis    COLONOSCOPY      HYDROCELE EXCISION      and spermatacele excision    KNEE ARTHROSCOPY  9/12    left --dr Lorna Gray S/P EXCISION     OTHER SURGICAL HISTORY  10/21/2013    BILATERAL DECOMPRESSIVE LUMBAR LAMINECTOMY L4-L5, POSSIBLE    SPINAL CORD STIMULATOR IMPLANTATION N/A 4/8/2019    SPINAL CORD STIMULATOR  TRIAL WITH NEVRO USING FLUOROSCOPY performed by Sophie Aden MD at [facility] Left 5/17/13    [facility]      Family History   Problem Relation Age of Onset    Cancer Mother 59        LUNG    Other Father 72        AORTIC ANEURYSM Cancer Sister         BREAST     Other Brother         HIV    Arthritis Other     Kidney Disease Other      Social History     Socioeconomic History    Marital status:      Spouse name: Lisandro White    Number of children: 4    Years of education: None    Highest education level: None   Occupational History    Occupation: Retired--Construction    Tobacco Use    Smoking status: Never    Smokeless tobacco: Never    Tobacco comments:     counseled on tobacco exposure avoidance   Vaping Use    Vaping Use: Never used   Substance and Sexual Activity    Alcohol use: Not Currently     Alcohol/week: 0.0 standard drinks    Drug use: No    Sexual activity: Yes     Partners: Female     Social Determinants of Health     Financial Resource Strain: Low Risk     Difficulty of Paying Living Expenses: Not hard at all   Food Insecurity: No Food Insecurity    Worried About Running Out of Food in the Last Year: Never true    [facility] in the Last Year: Never true     Prior to Visit Medications    Medication Sig Taking? Authorizing Provider   levothyroxine (SYNTHROID) 137 MCG tablet Take 137 mcg by mouth Daily Yes Historical Provider, MD   losartan-hydroCHLOROthiazide (HYZAAR) 50-12.5 MG per tablet TAKE ONE TABLET BY MOUTH DAILY Yes Oswald Kuo MD   finasteride (PROSCAR) 5 MG tablet  Yes Historical Provider, MD   Multiple Vitamins-Minerals (MULTIVITAL) TABS Take 1 tablet by mouth daily. Yes Historical Provider, MD     Allergies   Allergen Reactions    No Known Allergies        Review of Systems   Constitutional:  Negative for fatigue and unexpected weight change. HENT:  Positive for hearing loss and tinnitus. Eyes:  Negative for pain and visual disturbance. Respiratory:  Negative for shortness of breath and wheezing. Cardiovascular:  Negative for chest pain, palpitations and leg swelling. Gastrointestinal:  Negative for constipation, diarrhea, nausea and vomiting.    Endocrine: Negative for cold intolerance and heat intolerance. Genitourinary:  Negative for dysuria and frequency. Musculoskeletal:  Negative for gait problem and joint swelling. Skin:  Negative for color change and rash. Neurological:  Negative for dizziness and headaches. Psychiatric/Behavioral:  Negative for dysphoric mood. The patient is not nervous/anxious. PHYSICAL EXAM  /76 (Site: Right Upper Arm, Position: Sitting, Cuff Size: Large Adult)   Pulse 99   Resp 12   Wt 290 lb (131.5 kg)   SpO2 96%   BMI 44.75 kg/m²   Wt Readings from Last 3 Encounters:   08/17/22 290 lb (131.5 kg)   02/07/22 292 lb (132.5 kg)   12/07/21 282 lb (127.9 kg)     BP Readings from Last 3 Encounters:   08/17/22 122/76   02/07/22 138/80   12/07/21 130/82       Physical Exam  Constitutional:       Appearance: Normal appearance. He is well-developed. He is obese. HENT:      Head: Normocephalic and atraumatic. Right Ear: Tympanic membrane and ear canal normal.      Left Ear: Tympanic membrane and ear canal normal.   Eyes:      General: No scleral icterus. Conjunctiva/sclera: Conjunctivae normal.   Neck:      Thyroid: No thyromegaly. Cardiovascular:      Rate and Rhythm: Normal rate and regular rhythm. Heart sounds: Normal heart sounds. No murmur heard. Pulmonary:      Effort: Pulmonary effort is normal. No respiratory distress. Breath sounds: Normal breath sounds. No wheezing. Abdominal:      General: There is no distension. Palpations: Abdomen is soft. Tenderness: There is no abdominal tenderness. Musculoskeletal:         General: No deformity. Cervical back: Neck supple. Skin:     General: Skin is warm and dry. Findings: No rash. Neurological:      Mental Status: He is alert. Motor: No abnormal muscle tone. Comments: Motor 5/5 upper and lower ext   Speech clear   Psychiatric:         Mood and Affect: Mood normal.         Behavior: Behavior normal.         Thought Content:  Thought content normal. Judgment: Judgment normal.       ASSESSMENT/PLAN:   Freddie Stewart was seen today for pre-op exam.    Diagnoses and all orders for this visit:    Preop cardiovascular exam  -     EKG 12 Lead - Clinic Performed-nsr rate 99, RBBB, t wave abnv3  Change from the ekg8/15/22  Reviewed ekg jan 2018 - no RBBB  -     Fabiola Chandra MD, Cad[facility], [facility]  -     External Referral To Cardiology  -     VL ANDREA BILATERAL LIMITED 1-2 LEVELS; Future    Abnormal EKG  -     Fabiola Chandra MD, Cadiology, [facility]  -     External Referral To Cardiology    Deep vein thrombosis (DVT) of distal vein of lower extremity, unspecified chronicity, unspecified laterality ([facility] [address]    COVID-19 - symptoms and + test 8/6/22  -     Fabiola Chandra MD, [facility]iology, [facility]  -     External Referral To Cardiology    Essential hypertension on hyzaar- bp fine    Subdural hematoma (HCC)-1/9/18    Nephrolithiasis--right kidney 6/16---? symptomatic from them-sent to dr Soledad Tobar 6/16    H/O: gout    Purple toe syndrome of left foot ([facility] [address]  -     Cancel: VL ANDREA BILATERAL LIMITED 1-2 LEVELS; Future  -     VL ANDREA BILATERAL LIMITED 1-2 LEVELS; Future      Will need further evaluation with a cardiologist.  He had an EKG in January 2018 that did not show any right bundle branch block. His EKG today showed a right bundle branch block and was different than the EKG on 8/15/2022 that was sent to us from [facility] . I gave  him two cardiology referrals as a think he may have difficulty getting in soon. He is also a greater risk for blood clots and possible anesthesia risk with his recent COVID infection. He also has a history of a DVT in the past after knee surgery in 5/13. He has a history of a subdural hematoma in jan 2018. Will check ANDREA bilateral as he had some purple toes. Will Update the preop once I have this result back.     Addendum  8/23/22   Had normal  arterial study from  8/22/23   Conclusions      Summary

## 2022-08-22 ENCOUNTER — HOSPITAL ENCOUNTER (OUTPATIENT)
Dept: VASCULAR LAB | Age: 72
Discharge: HOME | End: 2022-08-22
Payer: MEDICARE

## 2022-08-22 DIAGNOSIS — I75.022 PURPLE TOE SYNDROME OF LEFT FOOT (HCC): ICD-10-CM

## 2022-08-22 DIAGNOSIS — Z01.810 PREOP CARDIOVASCULAR EXAM: ICD-10-CM

## 2022-08-22 PROCEDURE — 93922 UPR/L XTREMITY ART 2 LEVELS: CPT

## 2022-08-23 ENCOUNTER — OFFICE VISIT (OUTPATIENT)
Dept: CARDIOLOGY CLINIC | Age: 72
End: 2022-08-23
Payer: MEDICARE

## 2022-08-23 VITALS
BODY MASS INDEX: 44.1 KG/M2 | HEART RATE: 88 BPM | OXYGEN SATURATION: 94 % | SYSTOLIC BLOOD PRESSURE: 120 MMHG | WEIGHT: 291 LBS | HEIGHT: 68 IN | DIASTOLIC BLOOD PRESSURE: 72 MMHG

## 2022-08-23 DIAGNOSIS — E66.01 MORBID OBESITY (HCC): ICD-10-CM

## 2022-08-23 DIAGNOSIS — R94.31 ABNORMAL EKG: ICD-10-CM

## 2022-08-23 DIAGNOSIS — Z01.810 PREOP CARDIOVASCULAR EXAM: Primary | ICD-10-CM

## 2022-08-23 DIAGNOSIS — I10 ESSENTIAL HYPERTENSION: ICD-10-CM

## 2022-08-23 PROCEDURE — 99204 OFFICE O/P NEW MOD 45 MIN: CPT | Performed by: INTERNAL MEDICINE

## 2022-08-23 PROCEDURE — 1123F ACP DISCUSS/DSCN MKR DOCD: CPT | Performed by: INTERNAL MEDICINE

## 2022-08-23 PROCEDURE — 93000 ELECTROCARDIOGRAM COMPLETE: CPT | Performed by: INTERNAL MEDICINE

## 2022-08-23 RX ORDER — CETIRIZINE HYDROCHLORIDE 10 MG/1
10 TABLET ORAL DAILY PRN
COMMUNITY
End: 2022-09-28

## 2022-08-23 RX ORDER — TRAMADOL HYDROCHLORIDE 50 MG/1
50 TABLET ORAL EVERY 6 HOURS PRN
COMMUNITY
Start: 2022-08-15

## 2022-08-23 NOTE — PROGRESS NOTES
[facility]  Cardiology Consult Note      Chary Hall  1950, 67 y.o. Misbah Mina MD:  Chief Complaint   Patient presents with    New Patient     New pt x preop spine fusion      HPI:   This is a 67 y.o. male with a plast medical history of hypertension and DVT who presents today for a pre-operative risk assessment prior to a spinal fusion. He was seen by his primary care physician and completed an EKG that showed a RBBB which prompted the referral today. He denies any cardiac complaints. He states he is doing well. She denies any history of CKD, CHF, MI, CVA, or DM on insulin. He reports a functional capacity of >4 Mets but states his mobility is limited with the chronic orthopedic issues. He denies any chest pains or shortness of breath. He reports compliance with his medications. Patient denies exertional chest pain/pressure, dyspnea at rest, worsening CLIFTON, PND, orthopnea, palpitations, lightheadedness, weight changes, changes in LE edema, and syncope.     Past Medical History:   Diagnosis Date    Allergic rhinitis, seasonal     Bacterial meningitis     HX OF     Congenital absence of kidney     born with one kidney    Diverticulitis, colon     Gout     Gynecomastia     Hx of blood clots     postop knee replacement    Hydrocele, left     Lumbar disc disorder     Spermatocele     LEFT    Talipes cavus     Tinnitus     Wears contact lenses       Past Surgical History:   Procedure Laterality Date    BREAST LUMPECTOMY Left 08/12/2014    lipoma    BREAST SURGERY Right 2/4/15    removal of lipoma right breast.    CHOLECYSTECTOMY      COLECTOMY  1996    partial for diverticulitis    COLONOSCOPY      HYDROCELE EXCISION      and spermatacele excision    KNEE ARTHROSCOPY  9/12    left --dr Rob Lucas S/P EXCISION     OTHER SURGICAL HISTORY  10/21/2013    BILATERAL DECOMPRESSIVE LUMBAR LAMINECTOMY L4-L5, POSSIBLE    SPINAL CORD STIMULATOR IMPLANTATION N/A 4/8/2019    SPINAL CORD STIMULATOR  TRIAL WITH NEVRO USING FLUOROSCOPY performed by Hari Smiley MD at [facility] Left 5/17/13    [facility]       Family History   Problem Relation Age of Onset    Cancer Mother 59        LUNG    Other Father 72        AORTIC ANEURYSM    Cancer Sister         BREAST     Other Brother         HIV    Arthritis Other     Kidney Disease Other       Social History     Tobacco Use    Smoking status: Never    Smokeless tobacco: Never    Tobacco comments:     counseled on tobacco exposure avoidance   Vaping Use    Vaping Use: Never used   Substance Use Topics    Alcohol use: Yes     Comment: occ    Drug use: No     Allergies   Allergen Reactions    No Known Allergies      Review of Systems -   Constitutional: Negative for weight gain/loss; malaise, fever  Respiratory: Negative for Asthma;  cough and hemoptysis  Cardiovascular: Negative for palpitations,dizziness   Gastrointestinal: Negative for abd.pain; constipation/diarrhea;    Genitourinary: Negative for stones; hematuria; frequency hesitancy  Integumentt: Negative for rash or pruritis  Hematologic/lymphatic: Negative for blood dyscrasia; leukemia/lymphoma  Musculoskeletal: Negative for Connective tissue disease  Neurological:  Negative for Seizure   Behavioral/Psych:Negative for Bipolar disorder, Schizophrenia; Dementia  Endocrine: negative for thyroid, parathyroid disease    Physical Examination:    /72   Pulse 88   Ht 5' 7.5\" (1.715 m)   Wt 291 lb (132 kg)   SpO2 94%   BMI 44.90 kg/m²      Physical Exam:   Constitutional: The patient is oriented to person, place, and time. Appears well-developed and well-nourished. In no acute distress. Head: Normocephalic and atraumatic. Pupils equal and round. Neck: Neck supple. No JVP or carotid bruit appreciated. No mass and no thyromegaly present. No lymphadenopathy present. Cardiovascular: Normal rate. Normal heart sounds. Exam reveals no gallop and no friction rub. No murmur heard. Pulmonary/Chest: Effort normal and breath sounds normal. No respiratory distress. No wheezes, rhonchi or rales. Abdominal: Soft, non-tender. Bowel sounds are normal. Exhibits no organomegaly, mass or bruit. Extremities: No edema. No cyanosis or clubbing. Pulses are 2+ radial and carotid bilaterally. Neurological: No gross cranial nerve deficit. Coordination normal.   Skin: Skin is warm and dry. There is no rash or diaphoresis. Psychiatric: Patient has a normal mood and affect. Speech is normal and behavior is normal.      Outpatient Medications Marked as Taking for the 22 encounter (Office Visit) with Brenda Posey MD   Medication Sig Dispense Refill    vitamin D (CHOLECALCIFEROL) 25 MCG (1000 UT) TABS tablet Take 2,000 Units by mouth daily      cetirizine (ZYRTEC) 10 MG tablet Take 10 mg by mouth daily as needed      traMADol (ULTRAM) 50 MG tablet Take 50 mg by mouth every 6 hours as needed. levothyroxine (SYNTHROID) 137 MCG tablet Take 137 mcg by mouth Daily      losartan-hydroCHLOROthiazide (HYZAAR) 50-12.5 MG per tablet TAKE ONE TABLET BY MOUTH DAILY 90 tablet 1    finasteride (PROSCAR) 5 MG tablet Take 5 mg by mouth daily      Multiple Vitamins-Minerals (MULTIVITAL) TABS Take 1 tablet by mouth daily. Labs:   No results for input(s): WBC, HGB, HCT, PLT in the last 72 hours. No results for input(s): NA, K, CO2, BUN, CREATININE, LABGLOM in the last 72 hours. No results for input(s): BNP in the last 72 hours. Lab Results   Component Value Date/Time    HDL 44 2022 11:54 AM    LDLCALC 71 2022 11:54 AM    TRIG 150 2022 11:54 AM     EK22 sinus rhythm, RBBB. No echo or stress on file.      ASSESSMENT AND PLAN:    Pre-operative risk assessment   -reports functional capacity >4 Mets with chronic orthopedic pain   -denies any cardiac symptoms   -patient is low cardiac risk   -may proceed with surgery  -no additional cardiac testing is required prior to surgery     Abnormal EKG/RBBB  -sinus rhythm with RBBB  -asymptomatic   -no additional cardiac testing is necessary     Essential hypertension  -controlled  -continue medical therapy    Morbid obesity  -BMI 44.9  -encouraged weight loss and increase activity as tolerated     Follow up as needed     Thank you very much for allowing me to participate in the care of your patient. Please do not hesitate to contact me if you have any questions. Sincerely,    Thao Martinez M.D  [facility], [address] [place] [address]  Ph: [phone]  Fax: [phone]      This note was scribed in the presence of Dr Jim Negron MD by Kacie Lopez RN. Physician Attestation:  The scribes documentation has been prepared under my direction and personally reviewed by me in its entirety. I confirm that the note above accurately reflects all work, treatment, procedures, and medical decision making performed by me.

## 2022-09-22 ENCOUNTER — TELEPHONE (OUTPATIENT)
Dept: FAMILY MEDICINE CLINIC | Age: 72
End: 2022-09-22

## 2022-09-22 NOTE — TELEPHONE ENCOUNTER
Spouse calling in to schedule a hospital f/u with Dr. Ernesto Diggs. Pt was in [facility] from 8.31.2022 to 9.12.2022. Looked and next 40 minute appt with Dr. Tafoya is at end of day on 9.28.2022.     Please Advise if pt is needing seen sooner  Spouse can be reached at [phone]

## 2022-09-28 ENCOUNTER — OFFICE VISIT (OUTPATIENT)
Dept: FAMILY MEDICINE CLINIC | Age: 72
End: 2022-09-28
Payer: MEDICARE

## 2022-09-28 VITALS
RESPIRATION RATE: 16 BRPM | OXYGEN SATURATION: 97 % | HEART RATE: 75 BPM | BODY MASS INDEX: 46.62 KG/M2 | SYSTOLIC BLOOD PRESSURE: 116 MMHG | DIASTOLIC BLOOD PRESSURE: 60 MMHG | WEIGHT: 297 LBS | HEIGHT: 67 IN

## 2022-09-28 DIAGNOSIS — R06.83 SNORING: ICD-10-CM

## 2022-09-28 DIAGNOSIS — I48.91 ATRIAL FIBRILLATION, UNSPECIFIED TYPE (HCC): ICD-10-CM

## 2022-09-28 DIAGNOSIS — I82.403 DEEP VEIN THROMBOSIS (DVT) OF BOTH LOWER EXTREMITIES, UNSPECIFIED CHRONICITY, UNSPECIFIED VEIN (HCC): Primary | ICD-10-CM

## 2022-09-28 DIAGNOSIS — R60.0 BILATERAL LEG EDEMA: ICD-10-CM

## 2022-09-28 DIAGNOSIS — I10 ESSENTIAL HYPERTENSION: ICD-10-CM

## 2022-09-28 PROCEDURE — 1123F ACP DISCUSS/DSCN MKR DOCD: CPT | Performed by: INTERNAL MEDICINE

## 2022-09-28 PROCEDURE — 99214 OFFICE O/P EST MOD 30 MIN: CPT | Performed by: INTERNAL MEDICINE

## 2022-09-28 RX ORDER — ACETAMINOPHEN 325 MG/1
650 TABLET ORAL EVERY 6 HOURS PRN
COMMUNITY
Start: 2022-09-12

## 2022-09-28 RX ORDER — DIAZEPAM 5 MG/1
TABLET ORAL PRN
COMMUNITY
Start: 2022-09-23 | End: 2022-10-24

## 2022-09-28 RX ORDER — METOPROLOL SUCCINATE 100 MG/1
TABLET, EXTENDED RELEASE ORAL
COMMUNITY
Start: 2022-09-13

## 2022-09-28 RX ORDER — OXYCODONE HYDROCHLORIDE 5 MG/1
TABLET ORAL
COMMUNITY
Start: 2022-09-26 | End: 2022-10-24

## 2022-09-28 RX ORDER — FUROSEMIDE 20 MG/1
20 TABLET ORAL DAILY
Qty: 30 TABLET | Refills: 0 | Status: SHIPPED | OUTPATIENT
Start: 2022-09-28 | End: 2022-10-17

## 2022-09-28 RX ORDER — TAMSULOSIN HYDROCHLORIDE 0.4 MG/1
CAPSULE ORAL
COMMUNITY
Start: 2022-09-19

## 2022-09-28 RX ORDER — LOSARTAN POTASSIUM AND HYDROCHLOROTHIAZIDE 12.5; 5 MG/1; MG/1
TABLET ORAL
COMMUNITY
Start: 2022-09-13 | End: 2022-09-28

## 2022-09-28 RX ORDER — FINASTERIDE 5 MG/1
TABLET, FILM COATED ORAL
COMMUNITY
Start: 2022-09-13

## 2022-09-28 RX ORDER — CYCLOBENZAPRINE HCL 10 MG
TABLET ORAL
COMMUNITY
Start: 2022-09-22 | End: 2022-10-24

## 2022-09-28 ASSESSMENT — ENCOUNTER SYMPTOMS
COUGH: 0
SHORTNESS OF BREATH: 0

## 2022-09-28 NOTE — PROGRESS NOTES
Post-Discharge Transitional Care Follow Up      Vaishnavi Valenzuela   YOB: 1950    Date of Office Visit:  9/28/2022  Date of Hospital Admission: 8/31/22  Date of Hospital Discharge: 9/12/22  Readmission Risk Score (high >=14%. Medium >=10%):No data recorded    Care management risk score Rising risk (score 2-5) and Complex Care (Scores >=6): No Risk Score On File         Khloe Vallejo was seen today for follow-up from hospital.    Diagnoses and all orders for this visit:    Deep vein thrombosis (DVT) of both lower extremities, unspecified chronicity, unspecified vein ([facility] [address]  -     AFL - Sami Brooks MD, Oncology, [facility] hypertension on hyzaar    Atrial fibrillation, unspecified type [facility]) seeing [facility] cardiology    Bilateral leg edema  -     Renal Function Panel; Future    Snoring  -     Ambulatory referral to Sleep Medicine    Other orders  -     furosemide (LASIX) 20 MG tablet; Take 1 tablet by mouth daily Take one in the morning as needed for leg swelling   Needs sleep study  Needs to see hematology about dvt's  Will see cardiology on Friday with [facility]  Start lasix tomorrow         Subjective:   HPI    Inpatient course: Discharge summary reviewed- see chart. Surgery went well  Has a lot of pain relief  Has a muscle spasm on the right side of the ribs  It hurts to get in and out of bed.   Changed robaxan to flexeril  Think it helped   Still terrible  Seeing wound care for a wound on the left hand  ( after the ivs)    Started going into afib per wife  Around the time of the first surgery  Was on a vent for 2 days   Then left hand had ?ulcer  ( bruise and ulcer)  Had afib off and on  On zarelto  Seeing cardiology this week  Needs sleep apnea   Will have back brace off in  6 months    On oxycodone 1 q  4-6 hours    Legs swollen since dc from the hospital and in the hospital  Has BILAT  DVT  since after surgery    Denies flu shot    Had gross hematuria  and  clots in the hospital  Had to be taken to the OR       On xarelto one month pack       9/4/22  Impression:        o The evaluation is positive for the presence of acute deep vein thrombosis        in the right and left lower extremitites , as described above. Patient Active Problem List   Diagnosis    Colonic polyps(LAST COLO 5/15--pos mult small polyps--REPEAT 5 YR)--dr lynn    Tinnitus,CHRONIC    Hydrocele, left    Spermatocele,LEFT    Allergic rhinitis, seasonal    Gynecomastia, male    Diverticulitis of colon    Baker's cyst of knee-left    Primary localized osteoarthrosis, lower leg    DVT, lower extremity--post op--5/13 (s/p knee)    Spinal stenosis-pos affecting bilat lower legs--saw dr Artie Sánchez at [facility] 10/13--s/p surgery    Vitamin D deficiency--started 50,000 iu 2x/ wk 4/14    Neuropathic pain of both legs (HCC)--dr kelley for w/u 7/14    Idiopathic chronic gout of ankle without tophus    Right-sided low back pain with right-sided  sciatica -gabapentin  had SE, cymbalta(20 mg, SE with  30mg)PT helped Perfecto Rahman at [place]    Right hip pain    Other specified hypothyroidism    Nephrolithiasis--right kidney 6/16---? symptomatic from them-sent to dr Philip Bal 6/16    Abnormal finding of kidney--left kidney chronically shrunken--? etiol--nl renal fx 6/16    Subdural hematoma (HCC)-1/9/18    Elevated BP without diagnosis of hypertension    Essential hypertension on hyzaar    Lumbar radiculopathy    Lesion of subcutaneous tissue    Lipoma of left upper extremity    Hyperglycemia       Medications listed as ordered at the time of discharge from hospital     Medication List            Accurate as of September 28, 2022  4:45 PM. If you have any questions, ask your nurse or doctor.                 CONTINUE taking these medications      acetaminophen 325 MG tablet  Commonly known as: TYLENOL     cetirizine 10 MG tablet  Commonly known as: ZYRTEC     cyclobenzaprine 10 MG tablet  Commonly known as: FLEXERIL     diazePAM 5 MG tablet  Commonly known as: VALIUM     * finasteride 5 MG tablet  Commonly known as: PROSCAR     * finasteride 5 MG tablet  Commonly known as: PROSCAR     levothyroxine 150 MCG tablet  Commonly known as: SYNTHROID     * losartan-hydroCHLOROthiazide 50-12.5 MG per tablet  Commonly known as: HYZAAR  TAKE ONE TABLET BY MOUTH DAILY     * losartan-hydroCHLOROthiazide 50-12.5 MG per tablet  Commonly known as: HYZAAR     metoprolol succinate 100 MG extended release tablet  Commonly known as: TOPROL XL     Multivital Tabs     oxyCODONE 5 MG immediate release tablet  Commonly known as: ROXICODONE     tamsulosin 0.4 MG capsule  Commonly known as: FLOMAX     traMADol 50 MG tablet  Commonly known as: ULTRAM     vitamin D 25 MCG (1000 UT) Tabs tablet  Commonly known as: CHOLECALCIFEROL     Xarelto 15 MG Tabs tablet  Generic drug: rivaroxaban           * This list has 4 medication(s) that are the same as other medications prescribed for you. Read the directions carefully, and ask your doctor or other care provider to review them with you. Medications marked \"taking\" at this time  Outpatient Medications Marked as Taking for the 9/28/22 encounter (Office Visit) with Heavenly Thomas MD   Medication Sig Dispense Refill    metoprolol succinate (TOPROL XL) 100 MG extended release tablet 1 tablet DAILY (route: oral)      finasteride (PROSCAR) 5 MG tablet 1 tablet DAILY (route: oral)      losartan-hydroCHLOROthiazide (HYZAAR) 50-12.5 MG per tablet 1 tablet DAILY (route: oral)      cyclobenzaprine (FLEXERIL) 10 MG tablet       oxyCODONE (ROXICODONE) 5 MG immediate release tablet       diazePAM (VALIUM) 5 MG tablet as needed.       tamsulosin (FLOMAX) 0.4 MG capsule       acetaminophen (TYLENOL) 325 MG tablet Take 650 mg by mouth every 6 hours as needed      rivaroxaban (XARELTO) 15 MG TABS tablet Take 15 mg by mouth      vitamin D (CHOLECALCIFEROL) 25 MCG (1000 UT) TABS tablet Take 2,000 Units by mouth daily levothyroxine (SYNTHROID) 150 MCG tablet Take 150 mcg by mouth Daily      losartan-hydroCHLOROthiazide (HYZAAR) 50-12.5 MG per tablet TAKE ONE TABLET BY MOUTH DAILY 90 tablet 1    finasteride (PROSCAR) 5 MG tablet Take 5 mg by mouth daily      Multiple Vitamins-Minerals (MULTIVITAL) TABS Take 1 tablet by mouth daily. Review of Systems   Constitutional:  Negative for chills and fever. Respiratory:  Negative for cough and shortness of breath. Genitourinary:  Negative for hematuria. Objective:    /60 (Site: Right Upper Arm, Position: Sitting, Cuff Size: Large Adult)   Pulse 75   Resp 16   Ht 5' 7\" (1.702 m)   Wt 297 lb (134.7 kg)   SpO2 97%   BMI 46.52 kg/m²   Physical Exam  Constitutional:       Appearance: Normal appearance. HENT:      Head: Normocephalic and atraumatic. Cardiovascular:      Rate and Rhythm: Normal rate and regular rhythm. Pulmonary:      Effort: Pulmonary effort is normal.      Breath sounds: Normal breath sounds. Musculoskeletal:         General: Swelling (3 plus edema in both lower ext .) present. Neurological:      Mental Status: He is alert. Psychiatric:         Mood and Affect: Mood normal.         Behavior: Behavior normal.         Thought Content: Thought content normal.         Judgment: Judgment normal.       An electronic signature was used to authenticate this note.   --Misbah Mina MD

## 2022-09-28 NOTE — PATIENT INSTRUCTIONS
On xarelto for 2 reasons  afib and DVT  Referring to heme for DVT because you had a DVT prior  May need it long term

## 2022-10-03 ENCOUNTER — OFFICE VISIT (OUTPATIENT)
Dept: SLEEP MEDICINE | Age: 72
End: 2022-10-03
Payer: MEDICARE

## 2022-10-03 VITALS
SYSTOLIC BLOOD PRESSURE: 125 MMHG | RESPIRATION RATE: 18 BRPM | HEART RATE: 79 BPM | TEMPERATURE: 97.9 F | BODY MASS INDEX: 44.28 KG/M2 | WEIGHT: 292.2 LBS | DIASTOLIC BLOOD PRESSURE: 70 MMHG | OXYGEN SATURATION: 98 % | HEIGHT: 68 IN

## 2022-10-03 DIAGNOSIS — G47.19 EXCESSIVE DAYTIME SLEEPINESS: ICD-10-CM

## 2022-10-03 DIAGNOSIS — R60.0 BILATERAL LEG EDEMA: ICD-10-CM

## 2022-10-03 DIAGNOSIS — E66.01 CLASS 3 SEVERE OBESITY DUE TO EXCESS CALORIES WITH SERIOUS COMORBIDITY AND BODY MASS INDEX (BMI) OF 45.0 TO 49.9 IN ADULT (HCC): ICD-10-CM

## 2022-10-03 DIAGNOSIS — R06.83 SNORING: ICD-10-CM

## 2022-10-03 DIAGNOSIS — G47.33 OSA (OBSTRUCTIVE SLEEP APNEA): Primary | ICD-10-CM

## 2022-10-03 DIAGNOSIS — Z86.79 HISTORY OF ATRIAL FIBRILLATION: ICD-10-CM

## 2022-10-03 DIAGNOSIS — R06.81 WITNESSED EPISODE OF APNEA: ICD-10-CM

## 2022-10-03 DIAGNOSIS — R06.89 GASPING FOR BREATH: ICD-10-CM

## 2022-10-03 LAB
ALBUMIN SERPL-MCNC: 4.1 G/DL (ref 3.4–5)
ANION GAP SERPL CALCULATED.3IONS-SCNC: 13 MMOL/L (ref 3–16)
BUN BLDV-MCNC: 19 MG/DL (ref 7–20)
CALCIUM SERPL-MCNC: 8.9 MG/DL (ref 8.3–10.6)
CHLORIDE BLD-SCNC: 103 MMOL/L (ref 99–110)
CO2: 25 MMOL/L (ref 21–32)
CREAT SERPL-MCNC: 1.1 MG/DL (ref 0.8–1.3)
GFR AFRICAN AMERICAN: >60
GFR NON-AFRICAN AMERICAN: >60
GLUCOSE BLD-MCNC: 147 MG/DL (ref 70–99)
PHOSPHORUS: 4.1 MG/DL (ref 2.5–4.9)
POTASSIUM SERPL-SCNC: 4.3 MMOL/L (ref 3.5–5.1)
SODIUM BLD-SCNC: 141 MMOL/L (ref 136–145)

## 2022-10-03 PROCEDURE — 1123F ACP DISCUSS/DSCN MKR DOCD: CPT | Performed by: PSYCHIATRY & NEUROLOGY

## 2022-10-03 PROCEDURE — 99204 OFFICE O/P NEW MOD 45 MIN: CPT | Performed by: PSYCHIATRY & NEUROLOGY

## 2022-10-03 ASSESSMENT — ENCOUNTER SYMPTOMS
GASTROINTESTINAL NEGATIVE: 1
APNEA: 1
CHOKING: 1
BACK PAIN: 1
EYES NEGATIVE: 1
SHORTNESS OF BREATH: 1
ALLERGIC/IMMUNOLOGIC NEGATIVE: 1

## 2022-10-03 ASSESSMENT — SLEEP AND FATIGUE QUESTIONNAIRES
HOW LIKELY ARE YOU TO NOD OFF OR FALL ASLEEP WHILE SITTING QUIETLY AFTER LUNCH WITHOUT ALCOHOL: 1
NECK CIRCUMFERENCE (INCHES): 20
HOW LIKELY ARE YOU TO NOD OFF OR FALL ASLEEP WHILE LYING DOWN TO REST IN THE AFTERNOON WHEN CIRCUMSTANCES PERMIT: 2
HOW LIKELY ARE YOU TO NOD OFF OR FALL ASLEEP WHILE SITTING AND READING: 2
HOW LIKELY ARE YOU TO NOD OFF OR FALL ASLEEP WHILE WATCHING TV: 1
HOW LIKELY ARE YOU TO NOD OFF OR FALL ASLEEP WHEN YOU ARE A PASSENGER IN A CAR FOR AN HOUR WITHOUT A BREAK: 1
ESS TOTAL SCORE: 8
HOW LIKELY ARE YOU TO NOD OFF OR FALL ASLEEP IN A CAR, WHILE STOPPED FOR A FEW MINUTES IN TRAFFIC: 0
HOW LIKELY ARE YOU TO NOD OFF OR FALL ASLEEP WHILE SITTING AND TALKING TO SOMEONE: 0
HOW LIKELY ARE YOU TO NOD OFF OR FALL ASLEEP WHILE SITTING INACTIVE IN A PUBLIC PLACE: 1

## 2022-10-03 NOTE — PROGRESS NOTES
MD ANDREA Galarza Board Certified in Sleep Medicine  Certified [facility] Sleep Medicine  Board Certified in Neurology [address]  [address] [facility] [address] [address], [address]  [address], [address]           2230 [facility] [address] [address] [phone]    Subjective:     Patient ID: Mauricio Ervin is a 67 y.o. male. Chief Complaint   Patient presents with    Establish Care    Snoring         HPI:        Mauricio Ervin is a 67 y.o. male referred by Dr. Lenny Patel for a sleep evaluation. He complains of snoring, snorting, choking, periods of not breathing, tossing and turning, take naps during the day but he denies knees buckling with laughing, completely or partially paralyzed while falling asleep or waking up, noisy environment, uncomfortable room temperature, uncomfortable bedding. Symptoms began several months ago, gradually worsening since that time. The patient's bed-partner confirmed the snoring and stopped breathing at night. SLEEP SCHEDULE: Goes to bed around 12 AM in the weekdays and 12 AM in the weekends. It usually takes the patient 15 minutes to fall asleep. The patient gets up 1 per night to go to the bathroom. The Patient finally gets up at 8 AM during the weekdays and 8 AM in the weekends. patient wakes up with dry mouth. The patient has restless sleep with frequent arousals in addition to the Patient has significant daytime sleepiness. The Patient scored [place] Sleepiness Score: 8 on [place] Sleepiness Scale ( more than 10 is indicative of daytime sleepiness)and 36 in fatigue scale ( more than 36 is indicative of daytime fatigue). The patient takes daily nap for 30-60 minutes and usually is not refreshing nap. Had several episodes of A-Fib in the hospital last month. Previous evaluation and treatment has included- none. The Patient has been obese for many years and tried, has not gained weight in the last 5 years. DOT/CDL - N/A      Previous Report(s) Reviewed: historical medical records       Social History     Socioeconomic History    Marital status:      Spouse name: Coby Sandoval    Number of children: 4    Years of education: Not on file    Highest education level: Not on file   Occupational History    Occupation: Retired--Construction    Tobacco Use    Smoking status: Never     Passive exposure: Past    Smokeless tobacco: Never    Tobacco comments:     counseled on tobacco exposure avoidance   Vaping Use    Vaping Use: Never used   Substance and Sexual Activity    Alcohol use: Yes     Comment: occ    Drug use: No    Sexual activity: Yes     Partners: Female   Other Topics Concern    Not on file   Social History Narrative    Not on file     Social Determinants of Health     Financial Resource Strain: Low Risk     Difficulty of Paying Living Expenses: Not hard at all   Food Insecurity: No Food Insecurity    Worried About Running Out of Food in the Last Year: Never true    Ran Out of Food in the Last Year: Never true   Transportation Needs: Not on file   Physical Activity: Not on file   Stress: Not on file   Social Connections: Not on file   Intimate Partner Violence: Not on file   Housing Stability: Not on file       Prior to Admission medications    Medication Sig Start Date End Date Taking? Authorizing Provider   metoprolol succinate (TOPROL XL) 100 MG extended release tablet 1 tablet DAILY (route: oral) 9/13/22  Yes Historical Provider, MD   finasteride (PROSCAR) 5 MG tablet 1 tablet DAILY (route: oral) 9/13/22  Yes Historical Provider, MD   cyclobenzaprine (FLEXERIL) 10 MG tablet  9/22/22  Yes Historical Provider, MD   oxyCODONE (ROXICODONE) 5 MG immediate release tablet  9/26/22  Yes Historical Provider, MD   diazePAM (VALIUM) 5 MG tablet as needed.  9/23/22 Yes Historical Provider, MD   tamsulosin (FLOMAX) 0.4 MG capsule  9/19/22  Yes Historical Provider, MD   acetaminophen (TYLENOL) 325 MG tablet Take 650 mg by mouth every 6 hours as needed 9/12/22  Yes Historical Provider, MD   rivaroxaban (XARELTO) 15 MG TABS tablet Take 15 mg by mouth   Yes Historical Provider, MD   furosemide (LASIX) 20 MG tablet Take 1 tablet by mouth daily Take one in the morning as needed for leg swelling 9/28/22  Yes [facility], MD   vitamin D (CHOLECALCIFEROL) 25 MCG (1000 UT) TABS tablet Take 2,000 Units by mouth daily   Yes Historical Provider, MD   traMADol (ULTRAM) 50 MG tablet Take 50 mg by mouth every 6 hours as needed. 8/15/22  Yes Historical Provider, MD   levothyroxine (SYNTHROID) 150 MCG tablet Take 150 mcg by mouth Daily   Yes Historical Provider, MD   losartan-hydroCHLOROthiazide (HYZAAR) 50-12.5 MG per tablet TAKE ONE TABLET BY MOUTH DAILY 6/9/22  Yes Stacy Kuo MD   Multiple Vitamins-Minerals (MULTIVITAL) TABS Take 1 tablet by mouth daily.    Yes Historical Provider, MD       Allergies as of 10/03/2022 - Fully Reviewed 10/03/2022   Allergen Reaction Noted    No known allergies  08/07/2014       Patient Active Problem List   Diagnosis    Colonic polyps(LAST COLO 5/15--pos mult small polyps--REPEAT 5 YR)--dr lynn    Tinnitus,CHRONIC    Hydrocele, left    Spermatocele,LEFT    Allergic rhinitis, seasonal    Gynecomastia, male    Diverticulitis of colon    Baker's cyst of knee-left    Primary localized osteoarthrosis, lower leg    DVT, lower extremity--post op--5/13 (s/p knee)    Spinal stenosis-pos affecting bilat lower legs--saw dr Joel Beckford at [facility] 10/13--s/p surgery    Vitamin D deficiency--started 50,000 iu 2x/ wk 4/14    Neuropathic pain of both legs (HCC)--dr kelley for w/u 7/14    Idiopathic chronic gout of ankle without tophus    Right-sided low back pain with right-sided  sciatica -gabapentin  had SE, cymbalta(20 mg, SE with  30mg)PT helped Jacinta Bush at [facility] Right hip pain    Other specified hypothyroidism    Nephrolithiasis--right kidney 6/16---? symptomatic from them-sent to dr Enoch Churchill 6/16    Abnormal finding of kidney--left kidney chronically shrunken--? etiol--nl renal fx 6/16    Subdural hematoma (HCC)-1/9/18    Elevated BP without diagnosis of hypertension    Essential hypertension on hyzaar    Lumbar radiculopathy    Lesion of subcutaneous tissue    Lipoma of left upper extremity    Hyperglycemia       Past Medical History:   Diagnosis Date    Allergic rhinitis, seasonal     Bacterial meningitis     HX OF     Congenital absence of kidney     born with one kidney    Diverticulitis, colon     Gout     Gynecomastia     Hx of blood clots     postop knee replacement    Hydrocele, left     Lumbar disc disorder     Spermatocele     LEFT    Talipes cavus     Tinnitus     Wears contact lenses        Past Surgical History:   Procedure Laterality Date    BREAST LUMPECTOMY Left 08/12/2014    lipoma    BREAST SURGERY Right 2/4/15    removal of lipoma right breast.    CHOLECYSTECTOMY      COLECTOMY  1996    partial for diverticulitis    COLONOSCOPY      HYDROCELE EXCISION      and spermatacele excision    KNEE ARTHROSCOPY  9/12    left --dr Jose Clemens S/P EXCISION     OTHER SURGICAL HISTORY  10/21/2013    BILATERAL DECOMPRESSIVE LUMBAR LAMINECTOMY L4-L5, POSSIBLE    SPINAL CORD STIMULATOR IMPLANTATION N/A 4/8/2019    SPINAL CORD STIMULATOR  TRIAL WITH NEVRO USING FLUOROSCOPY performed by Leeanna Garner MD at [address] Left 5/17/13    [facility]        Family History   Problem Relation Age of Onset    Cancer Mother 59        LUNG    Other Father 72        AORTIC ANEURYSM    Cancer Sister         BREAST     Other Brother         HIV    Arthritis Other     Kidney Disease Other        Review of Systems   Constitutional:  Positive for fatigue. HENT: Negative. Eyes: Negative. circumference, Mallampati class of 4 and obesity. Diagnosis Orders   1. YOVANI (obstructive sleep apnea)  Home Sleep Study    Sleep Study with PAP Titration      2. Class 3 severe obesity due to excess calories with serious comorbidity and body mass index (BMI) of 45.0 to 49.9 in adult ([address]        3. Snoring        4. Witnessed episode of apnea        5. Gasping for breath        6. Excessive daytime sleepiness        7. History of atrial fibrillation          Plan:     Patient was counseled about the pathophysiology of obstructive sleep apnea syndrome and the methods for evaluating its presence and severity. Patient was counseled to avoid driving and other potentially hazardous circumstances if the patient is experiencing excessive sleepiness. Treatment considerations include the use of nasal CPAP, oral dental appliance or a surgical intervention, which should be based on otolarygologic findings, In the meantime, the patient should be cautioned to avoid the use of alcohol or other depressant medications because of potential for increasing the duration and severity of apnea and cautioned regarding driving or operating and dangerous equipment if the patient is experiencing daytime sleepiness. .  Weight loss; We discussed the proportionality between weight and AHI. With 10% weight change, the AHI has a 27% proportionate change. With 20% weight change, the AHI has a 45-50% proportionate change. Most likely treating the YOVANI will have positive impact on A-Fibcontrol. Orders Placed This Encounter   Procedures    Home Sleep Study    Sleep Study with PAP Titration         Return for F/U between 31 and 90 days from the recieving CPAP.     Sophie Taveras MD  Medical Director - [facility]

## 2022-10-03 NOTE — PATIENT INSTRUCTIONS
Orders Placed This Encounter   Procedures    Home Sleep Study     Standing Status:   Future     Standing Expiration Date:   10/3/2023     Order Specific Question:   Location For Sleep Study     Answer:   [facility]     Order Specific Question:   Select Sleep Lab Location     Answer:   [facility]    Sleep Study with PAP Titration     Standing Status:   Future     Standing Expiration Date:   10/3/2023     Order Specific Question:   Sleep Study Titration Type     Answer:   CPAP     Order Specific Question:   Location For Sleep Study     Answer:   [facility]     Order Specific Question:   Select Sleep Lab Location     Answer:   [facility]

## 2022-10-05 ENCOUNTER — OFFICE VISIT (OUTPATIENT)
Dept: FAMILY MEDICINE CLINIC | Age: 72
End: 2022-10-05
Payer: MEDICARE

## 2022-10-05 VITALS
OXYGEN SATURATION: 98 % | HEIGHT: 68 IN | BODY MASS INDEX: 43.5 KG/M2 | DIASTOLIC BLOOD PRESSURE: 62 MMHG | WEIGHT: 287 LBS | HEART RATE: 80 BPM | RESPIRATION RATE: 16 BRPM | SYSTOLIC BLOOD PRESSURE: 126 MMHG

## 2022-10-05 DIAGNOSIS — M85.80 OSTEOPENIA, UNSPECIFIED LOCATION: ICD-10-CM

## 2022-10-05 DIAGNOSIS — I82.4Z3 ACUTE DEEP VEIN THROMBOSIS (DVT) OF DISTAL VEIN OF BOTH LOWER EXTREMITIES (HCC): ICD-10-CM

## 2022-10-05 DIAGNOSIS — R60.0 BILATERAL LOWER EXTREMITY EDEMA: ICD-10-CM

## 2022-10-05 DIAGNOSIS — E55.9 VITAMIN D DEFICIENCY: ICD-10-CM

## 2022-10-05 DIAGNOSIS — Z00.00 MEDICARE ANNUAL WELLNESS VISIT, SUBSEQUENT: Primary | ICD-10-CM

## 2022-10-05 PROCEDURE — 1123F ACP DISCUSS/DSCN MKR DOCD: CPT | Performed by: INTERNAL MEDICINE

## 2022-10-05 PROCEDURE — G0439 PPPS, SUBSEQ VISIT: HCPCS | Performed by: INTERNAL MEDICINE

## 2022-10-05 PROCEDURE — 90694 VACC AIIV4 NO PRSRV 0.5ML IM: CPT | Performed by: INTERNAL MEDICINE

## 2022-10-05 PROCEDURE — G0008 ADMIN INFLUENZA VIRUS VAC: HCPCS | Performed by: INTERNAL MEDICINE

## 2022-10-05 SDOH — HEALTH STABILITY: PHYSICAL HEALTH
ON AVERAGE, HOW MANY DAYS PER WEEK DO YOU ENGAGE IN MODERATE TO STRENUOUS EXERCISE (LIKE A BRISK WALK)?: PATIENT DECLINED

## 2022-10-05 ASSESSMENT — LIFESTYLE VARIABLES
HOW OFTEN DO YOU HAVE SIX OR MORE DRINKS ON ONE OCCASION: 98
HOW MANY STANDARD DRINKS CONTAINING ALCOHOL DO YOU HAVE ON A TYPICAL DAY: 1 OR 2
HOW MANY STANDARD DRINKS CONTAINING ALCOHOL DO YOU HAVE ON A TYPICAL DAY: 98
HOW OFTEN DO YOU HAVE A DRINK CONTAINING ALCOHOL: 98
HOW OFTEN DO YOU HAVE A DRINK CONTAINING ALCOHOL: MONTHLY OR LESS

## 2022-10-05 ASSESSMENT — PATIENT HEALTH QUESTIONNAIRE - PHQ9
SUM OF ALL RESPONSES TO PHQ9 QUESTIONS 1 & 2: 0
2. FEELING DOWN, DEPRESSED OR HOPELESS: 0
SUM OF ALL RESPONSES TO PHQ QUESTIONS 1-9: 0
1. LITTLE INTEREST OR PLEASURE IN DOING THINGS: 0
SUM OF ALL RESPONSES TO PHQ QUESTIONS 1-9: 0
SUM OF ALL RESPONSES TO PHQ9 QUESTIONS 1 & 2: 0
SUM OF ALL RESPONSES TO PHQ QUESTIONS 1-9: 0
SUM OF ALL RESPONSES TO PHQ QUESTIONS 1-9: 0
2. FEELING DOWN, DEPRESSED OR HOPELESS: 0
SUM OF ALL RESPONSES TO PHQ QUESTIONS 1-9: 0
1. LITTLE INTEREST OR PLEASURE IN DOING THINGS: 0

## 2022-10-05 NOTE — PROGRESS NOTES
Medicare Annual Wellness Visit    Madie Harada is here for Medicare AWV (Patient is here for a medicare AWV)    Assessment & Plan     Patient's complete Health Risk Assessment and screening values have been reviewed and are found in Flowsheets. The following problems were reviewed today and where indicated follow up appointments were made and/or referrals ordered. Positive Risk Factor Screenings with Interventions:              Health Habits/Nutrition:  Physical Activity: Insufficiently Active    Days of Exercise per Week: 7 days    Minutes of Exercise per Session: 10 min     Have you lost any weight without trying in the past 3 months?: No  Body mass index: (!) 43.63  Have you seen the dentist within the past year?: Yes  Health Habits/Nutrition Interventions:  Lost wt from last week after starting lasix  Labs done  2 d ago ( down  5 lbs)  Renal was done and was fine      ADLs:  In the past 7 days, did you need help from others to perform any of the following everyday activities: Eating, dressing, grooming, bathing, toileting, or walking/balance?: (!) Yes  Select all that apply: (!) Walking/Balance, Grooming, Bathing, Toileting  In the past 7 days, did you need help from others to take care of any of the following: Laundry, housekeeping, banking/finances, shopping, telephone use, food preparation, transportation, or taking medications?: (!) Yes  Select all that apply: [employer], Housekeeping, Laundry, [employer], Shopping, Food Preparation, Taking Medications  ADL Interventions:  Needs a lot  of help right now due to surgery  Before surgery did not need help          Objective   Vitals:    10/05/22 1058   BP: 126/62   Site: Right Upper Arm   Position: Sitting   Cuff Size: Large Adult   Pulse: 80   Resp: 16   SpO2: 98%   Weight: 287 lb (130.2 kg)   Height: 5' 8\" (1.727 m)      Body mass index is 43.64 kg/m².              Allergies   Allergen Reactions    No Known Allergies      Prior to Visit Medications    Medication Sig Taking? Authorizing Provider   metoprolol succinate (TOPROL XL) 100 MG extended release tablet 1 tablet DAILY (route: oral) Yes Historical Provider, MD   finasteride (PROSCAR) 5 MG tablet 1 tablet DAILY (route: oral) Yes Historical Provider, MD   cyclobenzaprine (FLEXERIL) 10 MG tablet  Yes Historical Provider, MD   oxyCODONE (ROXICODONE) 5 MG immediate release tablet  Yes Historical Provider, MD   diazePAM (VALIUM) 5 MG tablet as needed. Yes Historical Provider, MD   tamsulosin (FLOMAX) 0.4 MG capsule  Yes Historical Provider, MD   acetaminophen (TYLENOL) 325 MG tablet Take 650 mg by mouth every 6 hours as needed Yes Historical Provider, MD   rivaroxaban (XARELTO) 15 MG TABS tablet Take 15 mg by mouth Yes Historical Provider, MD   furosemide (LASIX) 20 MG tablet Take 1 tablet by mouth daily Take one in the morning as needed for leg swelling Yes Lit Figueroa MD   vitamin D (CHOLECALCIFEROL) 25 MCG (1000 UT) TABS tablet Take 2,000 Units by mouth daily Yes Historical Provider, MD   traMADol (ULTRAM) 50 MG tablet Take 50 mg by mouth every 6 hours as needed. Yes Historical Provider, MD   levothyroxine (SYNTHROID) 150 MCG tablet Take 150 mcg by mouth Daily Yes Historical Provider, MD   losartan-hydroCHLOROthiazide (HYZAAR) 50-12.5 MG per tablet TAKE ONE TABLET BY MOUTH DAILY Yes Geetha Kuo MD   Multiple Vitamins-Minerals (MULTIVITAL) TABS Take 1 tablet by mouth daily. Yes Historical Provider, MD Lobo (Including outside providers/suppliers regularly involved in providing care):   Patient Care Team:  Lit Figueroa MD as PCP - General (Internal Medicine)  Lit Figueroa MD as PCP - [facility] Empaneled Provider     Reviewed and updated this visit:  Tobacco  Allergies  Meds  Problems  Med Hx  Surg Hx  Soc Hx  Fam Hx        He had a dexa prior to surgery  6/15/22     IMPRESSION:   Osteopenia by WHO criteria.      Taking 2000 IU vit d /day  1200 mg calcium /day  Johnathan Bright was seen today for medicare awv. Diagnoses and all orders for this visit:    Medicare annual wellness visit, subsequent    Osteopenia, unspecified location  -     Vitamin D 25 Hydroxy; Future    Vitamin D deficiency  -     Vitamin D 25 Hydroxy;  Future    Acute deep vein thrombosis (DVT) of distal vein of both lower extremities ([facility] [place] [address] 9/4/22 bilat after surgery back    Bilateral lower extremity edema      Discussed need for calcium  Take  2k vit d /day  Seeing heme for dvt / anemia  Wt down and legs smaller with lasix  Continue the lasix daily  Potassium fine  Follow up 3 weeks

## 2022-10-05 NOTE — PATIENT INSTRUCTIONS
Take  1200mg calcium/day divide     Personalized Preventive Plan for Deven Berger - 10/5/2022  Medicare offers a range of preventive health benefits. Some of the tests and screenings are paid in full while other may be subject to a deductible, co-insurance, and/or copay. Some of these benefits include a comprehensive review of your medical history including lifestyle, illnesses that may run in your family, and various assessments and screenings as appropriate. After reviewing your medical record and screening and assessments performed today your provider may have ordered immunizations, labs, imaging, and/or referrals for you. A list of these orders (if applicable) as well as your Preventive Care list are included within your After Visit Summary for your review. Other Preventive Recommendations:    A preventive eye exam performed by an eye specialist is recommended every 1-2 years to screen for glaucoma; cataracts, macular degeneration, and other eye disorders. A preventive dental visit is recommended every 6 months. Try to get at least 150 minutes of exercise per week or 10,000 steps per day on a pedometer . Order or download the FREE \"Exercise & Physical Activity: Your Everyday Guide\" from [employer]. Call 2-194.813.6058 or search [employer] online. You need 6616-3211 mg of calcium and 4315-3645 IU of vitamin D per day. It is possible to meet your calcium requirement with diet alone, but a vitamin D supplement is usually necessary to meet this goal.  When exposed to the sun, use a sunscreen that protects against both UVA and UVB radiation with an SPF of 30 or greater. Reapply every 2 to 3 hours or after sweating, drying off with a towel, or swimming. Always wear a seat belt when traveling in a car. Always wear a helmet when riding a bicycle or motorcycle.

## 2022-10-12 ENCOUNTER — HOSPITAL ENCOUNTER (OUTPATIENT)
Dept: GENERAL RADIOLOGY | Age: 72
Discharge: HOME | End: 2022-10-12
Payer: MEDICARE

## 2022-10-12 ENCOUNTER — HOSPITAL ENCOUNTER (OUTPATIENT)
Age: 72
Discharge: HOME | End: 2022-10-12
Payer: MEDICARE

## 2022-10-12 DIAGNOSIS — M54.89 OTHER BACK PAIN, UNSPECIFIED CHRONICITY: ICD-10-CM

## 2022-10-12 PROCEDURE — 72082 X-RAY EXAM ENTIRE SPI 2/3 VW: CPT

## 2022-10-17 RX ORDER — FUROSEMIDE 20 MG/1
TABLET ORAL
Qty: 30 TABLET | Refills: 0 | Status: SHIPPED | OUTPATIENT
Start: 2022-10-17 | End: 2022-10-24 | Stop reason: SDUPTHER

## 2022-10-24 ENCOUNTER — OFFICE VISIT (OUTPATIENT)
Dept: FAMILY MEDICINE CLINIC | Age: 72
End: 2022-10-24
Payer: MEDICARE

## 2022-10-24 VITALS
WEIGHT: 285 LBS | SYSTOLIC BLOOD PRESSURE: 132 MMHG | HEIGHT: 68 IN | BODY MASS INDEX: 43.19 KG/M2 | OXYGEN SATURATION: 95 % | HEART RATE: 75 BPM | DIASTOLIC BLOOD PRESSURE: 58 MMHG | RESPIRATION RATE: 16 BRPM

## 2022-10-24 DIAGNOSIS — R25.2 CRAMPING OF FEET: Primary | ICD-10-CM

## 2022-10-24 DIAGNOSIS — E03.9 HYPOTHYROIDISM, UNSPECIFIED TYPE: ICD-10-CM

## 2022-10-24 DIAGNOSIS — R25.2 CRAMPING OF FEET: ICD-10-CM

## 2022-10-24 DIAGNOSIS — E55.9 VITAMIN D DEFICIENCY: ICD-10-CM

## 2022-10-24 DIAGNOSIS — M85.80 OSTEOPENIA, UNSPECIFIED LOCATION: ICD-10-CM

## 2022-10-24 DIAGNOSIS — I10 ESSENTIAL HYPERTENSION: ICD-10-CM

## 2022-10-24 LAB
ALBUMIN SERPL-MCNC: 4.4 G/DL (ref 3.4–5)
ANION GAP SERPL CALCULATED.3IONS-SCNC: 12 MMOL/L (ref 3–16)
BUN BLDV-MCNC: 15 MG/DL (ref 7–20)
CALCIUM SERPL-MCNC: 9.2 MG/DL (ref 8.3–10.6)
CHLORIDE BLD-SCNC: 103 MMOL/L (ref 99–110)
CO2: 27 MMOL/L (ref 21–32)
CREAT SERPL-MCNC: 1.1 MG/DL (ref 0.8–1.3)
GFR SERPL CREATININE-BSD FRML MDRD: >60 ML/MIN/{1.73_M2}
GLUCOSE BLD-MCNC: 141 MG/DL (ref 70–99)
MAGNESIUM: 2.4 MG/DL (ref 1.8–2.4)
PHOSPHORUS: 4.1 MG/DL (ref 2.5–4.9)
POTASSIUM SERPL-SCNC: 4.6 MMOL/L (ref 3.5–5.1)
SODIUM BLD-SCNC: 142 MMOL/L (ref 136–145)
TSH REFLEX: 1.88 UIU/ML (ref 0.27–4.2)
VITAMIN D 25-HYDROXY: 39.2 NG/ML

## 2022-10-24 PROCEDURE — 99214 OFFICE O/P EST MOD 30 MIN: CPT | Performed by: INTERNAL MEDICINE

## 2022-10-24 PROCEDURE — 1123F ACP DISCUSS/DSCN MKR DOCD: CPT | Performed by: INTERNAL MEDICINE

## 2022-10-24 RX ORDER — FUROSEMIDE 20 MG/1
TABLET ORAL
Qty: 90 TABLET | Refills: 0 | Status: SHIPPED | OUTPATIENT
Start: 2022-10-24

## 2022-10-24 RX ORDER — LANOLIN ALCOHOL/MO/W.PET/CERES
CREAM (GRAM) TOPICAL
Qty: 1 TABLET | Refills: 0 | COMMUNITY
Start: 2022-10-24

## 2022-10-24 NOTE — PATIENT INSTRUCTIONS
potassium content foods  Highest content (>25 mEq/100 g)   Dried figs   Molasses   Seaweed   Very high content (>12.5 mEq/100 g)   Dried fruits (dates, prunes)   Nuts   Avocados   Bran cereals   Wheat germ   Lima beans    High content (>6.2 mEq/100 g)   Vegetables   Spinach   Tomatoes   Broccoli   Winter squash   Beets   Carrots   Cauliflower   Potatoes   Fruits   Bananas   Cantaloupe   Kiwis   Oranges   Mangos   Meats   Ground beef   Steak   Pork   Veal   Lamb      Adapted from: Rosa Barcenas. Hypokalemia. [address]; [id number].   Graphic [address]3042 Version 4.0

## 2022-10-24 NOTE — PROGRESS NOTES
Xander Colvin (:  1950) is a 67 y.o. male, here for evaluation of the following chief complaint(s):  Medication Check (Patient is here for a medication follow up )    Mili Aragon was seen today for medication check. Diagnoses and all orders for this visit:    Cramping of feet  -     Renal Function Panel; Future  -     Magnesium; Future    Hypothyroidism, unspecified type  -     TSH with Reflex; Future    Essential hypertension on hyzaar    Other orders  -     furosemide (LASIX) 20 MG tablet; TAKE ONE TABLET BY MOUTH EVERY MORNING AS NEEDED FOR LEG SWELLING   Let ck labs today   Tomorrow getting home sleep study kit  Bp lowish on the diastolic  Re ck thyroid test  since doing the labs  Unclear what caused the cramping in the feet this week.     Subjective   SUBJECTIVE/OBJECTIVE:  HPI  Had vit d  level ck with hematology last week  Taking  2k iu vit d /day    Going to wound care for left hand ulceration at [facility]  Was told he was anemic  Started iron iron otc  Taking one a day  45 mg  Had cramp right foot for hours /all day Saturday  Drank a lot of fluid and better yesterday and today -ok  Stopped lasix bc of the foot cramp  Off lasix 3 days     Off flexeril    Lab Results   Component Value Date    LABA1C 5.6 12/10/2021    LABA1C 5.3 2021    LABA1C 5.8 2021    LABMICR <1.20 2021    LABMICR Not Indicated 06/10/2016       Lab Results   Component Value Date     10/03/2022     2021     2021    K 4.3 10/03/2022    K 4.4 12/10/2021    K 5.2 (H) 2021     10/03/2022     2021     2021    CO2 25 10/03/2022    CO2 25 2021    CO2 27 2021    BUN 19 10/03/2022    BUN 17 2021    BUN 17 2021    CREATININE 1.1 10/03/2022    CREATININE 1.1 2021    CREATININE 0.9 2021    GLUCOSE 147 (H) 10/03/2022    GLUCOSE 139 (H) 2021    GLUCOSE 126 (H) 2021    CALCIUM 8.9 10/03/2022    CALCIUM 9.1 2021 CALCIUM 8.9 07/27/2021       Lab Results   Component Value Date    CHOL 145 02/07/2022    CHOL 125 01/26/2021    CHOL 146 02/18/2020    TRIG 150 02/07/2022    TRIG 167 (H) 01/26/2021    TRIG 145 02/18/2020    HDL 44 02/07/2022    HDL 44 01/26/2021    HDL 44 02/18/2020    LDLCALC 71 02/07/2022    LDLCALC 48 01/26/2021    LDLCALC 73 02/18/2020       Lab Results   Component Value Date    ALT 23 12/07/2021    ALT 20 01/26/2021    ALT 19 02/18/2020    AST 27 12/07/2021    AST 18 01/26/2021    AST 17 02/18/2020       Lab Results   Component Value Date    TSH 2.49 04/16/2019    TSH 4.28 (H) 02/18/2019    TSH 2.27 02/06/2018    T4FREE 1.5 01/26/2021    T4FREE 1.2 02/12/2019    T4FREE 1.1 09/21/2017       Lab Results   Component Value Date    WBC 7.4 01/31/2022    WBC 5.8 12/07/2021    WBC 4.7 04/08/2019    HGB 14.2 01/31/2022    HGB 14.3 12/07/2021    HGB 14.1 04/08/2019    HCT 42.2 01/31/2022    HCT 43.0 12/07/2021    HCT 41.0 04/08/2019    MCV 97.1 01/31/2022    MCV 98.2 12/07/2021    MCV 94.5 04/08/2019     01/31/2022     12/07/2021     04/08/2019       Lab Results   Component Value Date    PSA 2.91 12/10/2021    PSA 2.33 04/27/2015    PSA 3.27 04/24/2014        Lab Results   Component Value Date    LABURIC 5.5 09/21/2017    LABURIC 5.0 10/28/2016    LABURIC 4.9 04/29/2016        Vitals:    10/24/22 1144   BP: (!) 132/58   Pulse: 75   Resp: 16   SpO2: 95%       Review of Systems       Objective   Physical Exam  Constitutional:       Appearance: Normal appearance. HENT:      Head: Normocephalic and atraumatic. Cardiovascular:      Rate and Rhythm: Normal rate and regular rhythm. Pulmonary:      Effort: Pulmonary effort is normal.      Breath sounds: Normal breath sounds. Musculoskeletal:         General: Swelling (trace  lower ext bilat) present. Neurological:      Mental Status: He is alert.           Praveen Lin MD

## 2022-10-25 ENCOUNTER — HOSPITAL ENCOUNTER (OUTPATIENT)
Dept: SLEEP CENTER | Age: 72
Discharge: HOME | End: 2022-10-25
Payer: MEDICARE

## 2022-10-25 DIAGNOSIS — G47.33 OSA (OBSTRUCTIVE SLEEP APNEA): ICD-10-CM

## 2022-10-25 PROCEDURE — 95806 SLEEP STUDY UNATT&RESP EFFT: CPT

## 2022-10-25 PROCEDURE — 95806 SLEEP STUDY UNATT&RESP EFFT: CPT | Performed by: PSYCHIATRY & NEUROLOGY

## 2022-10-27 PROBLEM — G47.33 OSA (OBSTRUCTIVE SLEEP APNEA): Status: ACTIVE | Noted: 2022-10-27

## 2022-10-28 ENCOUNTER — TELEPHONE (OUTPATIENT)
Dept: PULMONOLOGY | Age: 72
End: 2022-10-28

## 2022-10-28 NOTE — TELEPHONE ENCOUNTER
Sleep study showed severe YOVANI. AHI was 67.8  per hr. And O2 Desaturations to 68%. Dr Heber Medley titration.    Pt notified  transferred to the sleep lab

## 2022-11-28 ENCOUNTER — HOSPITAL ENCOUNTER (OUTPATIENT)
Dept: SLEEP CENTER | Age: 72
Discharge: HOME | End: 2022-11-28
Payer: MEDICARE

## 2022-11-28 DIAGNOSIS — G47.33 OSA (OBSTRUCTIVE SLEEP APNEA): ICD-10-CM

## 2022-11-28 PROCEDURE — 95811 POLYSOM 6/>YRS CPAP 4/> PARM: CPT | Performed by: PSYCHIATRY & NEUROLOGY

## 2022-11-28 PROCEDURE — 95811 POLYSOM 6/>YRS CPAP 4/> PARM: CPT

## 2022-11-30 NOTE — PROGRESS NOTES
Talisha Antunez         : 1950  [] [address]     [] [address]      [] [facility]     [x][facility]    [] [facility]  [] [facility]  [] [facility]   [] [facility] [] Other:  Diagnosis: [x] YOVANI (G47.33) [] CSA (G47.31) [] Apnea (G47.30)   Length of Need: [] 12 Months [x] 99 Months [] Other:    Machine (SAMUEL!):  [x] ResMed AirSense     Auto [] Other:     [x]  CPAP () [] Bilevel ()   Mode: [x] Auto [] Spontaneous    Mode: [] Auto [] Spontaneous           12 cm                 Comfort Settings:   - Ramp Pressure: 5 cmH2O                                        - Ramp time: 15 min                                     -  Flex/EPR - 3 full time                                    - For ResMed Bilevel (TiMax-4 sec   TiMin- 0.2 sec)     Humidifier: [x] Heated ()        [x] Water chamber replacement ()/ 1 per 6 months        Mask:  Please always start with the mask the patient used during the titraion   [x] Nasal () /1 per 3 months [] Full Face () /1 per 3 months   [x] Patient choice -Size and fit mask [] Patient Choice - Size and fit mask   [] Dispense:   a small [employer]els FX nasal pillows  [] Dispense:    [x] Headgear () / 1 per 3 months [] Headgear () / 1 per 3 months   [x] Replacement Nasal Cushion ()/2 per month [] Interface Replacement ()/1 per month   [x] Replacement Nasal Pillows ()/2 per month         Tubing: [x] Heated ()/1 per 3 months    [] Standard ()/1 per 3 months [] Other:           Filters: [x] Non-disposable ()/1 per 6 months     [x] Ultra-Fine, Disposable ()/2 per month        Miscellaneous: [x] Chin Strap ()/ 1 per 6 months [] O2 bleed-in:       LPM   [] Oximetry on CPAP/Bilevel []  Other:          Start Order Date: 22    MEDICAL JUSTIFICATION:  I, the undersigned, certify that the above prescribed supplies are medically necessary for this patients wellbeing.   In my opinion, the supplies are both reasonable and necessary in reference to accepted standards of medicalpractice in treatment of this patients condition.     Neftaly Hou MD      NPI: [id number]       Order Signed Date: 11/30/22    Electronically signed by Neftaly Hou MD on 11/30/2022 at 8:20 AM

## 2022-12-01 ENCOUNTER — TELEPHONE (OUTPATIENT)
Dept: PULMONOLOGY | Age: 72
End: 2022-12-01

## 2022-12-01 NOTE — TELEPHONE ENCOUNTER
Message left for patient to call re;History of severe sleep apnea adequately controled on CPAP pressure of 12 cm    DME choice Pending.

## 2022-12-01 NOTE — TELEPHONE ENCOUNTER
SW patient and gave him results. He would like his order send to [facility] in [facility]. Order sent to [facility] as of today.

## 2022-12-06 ENCOUNTER — TELEPHONE (OUTPATIENT)
Dept: PULMONOLOGY | Age: 72
End: 2022-12-06

## 2022-12-06 NOTE — TELEPHONE ENCOUNTER
Yasmin from [facility] calls in to say that they are not contracted with [facility] His CPAP order will need to be sent somewhere else. Thank you.

## 2022-12-06 NOTE — TELEPHONE ENCOUNTER
Pt notified  States to fax info to [facility]  I told pt he had to call and make sure they are contracted with his insurance

## 2022-12-08 ENCOUNTER — TELEPHONE (OUTPATIENT)
Dept: PULMONOLOGY | Age: 72
End: 2022-12-08

## 2022-12-08 NOTE — TELEPHONE ENCOUNTER
Jose G Phipps called in asking for his CPAP order to be sent to [address] [place] [address]. Please send it there. Thank you!

## 2022-12-22 RX ORDER — LOSARTAN POTASSIUM AND HYDROCHLOROTHIAZIDE 12.5; 5 MG/1; MG/1
TABLET ORAL
Qty: 90 TABLET | Refills: 1 | Status: SHIPPED | OUTPATIENT
Start: 2022-12-22

## 2023-01-24 ENCOUNTER — OFFICE VISIT (OUTPATIENT)
Dept: FAMILY MEDICINE CLINIC | Age: 73
End: 2023-01-24

## 2023-01-24 ENCOUNTER — TELEPHONE (OUTPATIENT)
Dept: PULMONOLOGY | Age: 73
End: 2023-01-24

## 2023-01-24 VITALS
RESPIRATION RATE: 16 BRPM | OXYGEN SATURATION: 95 % | BODY MASS INDEX: 43.04 KG/M2 | SYSTOLIC BLOOD PRESSURE: 118 MMHG | HEART RATE: 69 BPM | HEIGHT: 68 IN | DIASTOLIC BLOOD PRESSURE: 62 MMHG | WEIGHT: 284 LBS

## 2023-01-24 DIAGNOSIS — I48.91 ATRIAL FIBRILLATION, UNSPECIFIED TYPE (HCC): ICD-10-CM

## 2023-01-24 DIAGNOSIS — I82.4Z3 ACUTE DEEP VEIN THROMBOSIS (DVT) OF DISTAL VEIN OF BOTH LOWER EXTREMITIES (HCC): ICD-10-CM

## 2023-01-24 DIAGNOSIS — I10 ESSENTIAL HYPERTENSION: ICD-10-CM

## 2023-01-24 DIAGNOSIS — E66.01 OBESITY, CLASS III, BMI 40-49.9 (MORBID OBESITY) (HCC): ICD-10-CM

## 2023-01-24 DIAGNOSIS — I10 ESSENTIAL HYPERTENSION: Primary | ICD-10-CM

## 2023-01-24 LAB
A/G RATIO: 1.8 (ref 1.1–2.2)
ALBUMIN SERPL-MCNC: 4.2 G/DL (ref 3.4–5)
ALP BLD-CCNC: 87 U/L (ref 40–129)
ALT SERPL-CCNC: 11 U/L (ref 10–40)
ANION GAP SERPL CALCULATED.3IONS-SCNC: 13 MMOL/L (ref 3–16)
AST SERPL-CCNC: 13 U/L (ref 15–37)
BILIRUB SERPL-MCNC: 0.4 MG/DL (ref 0–1)
BUN BLDV-MCNC: 24 MG/DL (ref 7–20)
CALCIUM SERPL-MCNC: 9.1 MG/DL (ref 8.3–10.6)
CHLORIDE BLD-SCNC: 102 MMOL/L (ref 99–110)
CO2: 26 MMOL/L (ref 21–32)
CREAT SERPL-MCNC: 1.1 MG/DL (ref 0.8–1.3)
GFR SERPL CREATININE-BSD FRML MDRD: >60 ML/MIN/{1.73_M2}
GLUCOSE BLD-MCNC: 109 MG/DL (ref 70–99)
POTASSIUM SERPL-SCNC: 4.3 MMOL/L (ref 3.5–5.1)
SODIUM BLD-SCNC: 141 MMOL/L (ref 136–145)
TOTAL PROTEIN: 6.6 G/DL (ref 6.4–8.2)

## 2023-01-24 ASSESSMENT — PATIENT HEALTH QUESTIONNAIRE - PHQ9
2. FEELING DOWN, DEPRESSED OR HOPELESS: 0
SUM OF ALL RESPONSES TO PHQ QUESTIONS 1-9: 0
SUM OF ALL RESPONSES TO PHQ QUESTIONS 1-9: 0
1. LITTLE INTEREST OR PLEASURE IN DOING THINGS: 0
SUM OF ALL RESPONSES TO PHQ9 QUESTIONS 1 & 2: 0
SUM OF ALL RESPONSES TO PHQ QUESTIONS 1-9: 0
SUM OF ALL RESPONSES TO PHQ QUESTIONS 1-9: 0
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
## 2023-01-24 NOTE — PROGRESS NOTES
Hua Lakhani         : 1950        PHONE: [phone] (home)   [x] [address]     [] [address]      [] Tanya     [] Khalif    [] Linus Marmolejo  [] [facility]   [] [address] [place] [address]  [] [facility] [] Other:     Diagnosis: [x] YOVANI (G47.33) [] CSA (G47.31) [] Apnea (G47.30)   Length of Need: [] 12 Months [x] 99 Months [] Other:    Machine (SAMUEL!): [] Respironics Dream Station      Auto [] ResMed AirSense     Auto [] Other:     []  CPAP () [] Bilevel ()   Mode: [] Auto [] Spontaneous    Mode: [] Auto [] Spontaneous           Please change the CPAP to APAP between 6 and 9 cm                 Comfort Settings:   - Ramp Pressure: 5 cmH2O                                        - Ramp time: 15 min                                     -  Flex/EPR - 3 full time                                    - For ResMed Bilevel (TiMax-4 sec   TiMin- 0.2 sec)     Humidifier: [] Heated ()        [x] Water chamber replacement ()/ 1 per 6 months        Mask:   [] Nasal () /1 per 3 months [] Full Face () /1 per 3 months   [] Patient choice -Size and fit mask [] Patient Choice - Size and fit mask   [] Dispense:  [] Dispense:    [] Headgear () / 1 per 3 months [] Headgear () / 1 per 3 months   [] Replacement Nasal Cushion ()/2 per month [] Interface Replacement ()/1 per month   [] Replacement Nasal Pillows ()/2 per month         Tubing: [x] Heated ()/1 per 3 months    [] Standard ()/1 per 3 months [] Other:           Filters: [x] Non-disposable ()/1 per 6 months     [x] Ultra-Fine, Disposable ()/2 per month        Miscellaneous: [x] Chin Strap ()/ 1 per 6 months [] O2 bleed-in:       LPM   [] Oximetry on CPAP/Bilevel []  Other:          Start Order Date: 23    MEDICAL JUSTIFICATION:  I, the undersigned, certify that the above prescribed supplies are medically necessary for this patients wellbeing.   In my opinion, the supplies are both reasonable and necessary in reference to accepted standards of medicalpractice in treatment of this patients condition.     Alina Thomas MD      NPI: [id number]       Order Signed Date: 01/24/23    Electronically signed by lAina Thomas MD on 1/24/2023 at 12:39 PM

## 2023-01-24 NOTE — TELEPHONE ENCOUNTER
Patient comes by stating he can't sleep with his CPAP machine. He thinks the pressure is to high and the nasal mask is leaking. He uses [facility] as his DME. He has tried 2 masks and is tossing and turning all night.   Please advise

## 2023-02-20 ENCOUNTER — OFFICE VISIT (OUTPATIENT)
Dept: SLEEP MEDICINE | Age: 73
End: 2023-02-20
Payer: MEDICARE

## 2023-02-20 VITALS
HEIGHT: 68 IN | TEMPERATURE: 97.5 F | RESPIRATION RATE: 18 BRPM | WEIGHT: 288.4 LBS | SYSTOLIC BLOOD PRESSURE: 120 MMHG | OXYGEN SATURATION: 97 % | HEART RATE: 83 BPM | DIASTOLIC BLOOD PRESSURE: 80 MMHG | BODY MASS INDEX: 43.71 KG/M2

## 2023-02-20 DIAGNOSIS — G47.00 INSOMNIA WITH SLEEP APNEA: Primary | ICD-10-CM

## 2023-02-20 DIAGNOSIS — Z99.89 OSA ON CPAP: Primary | ICD-10-CM

## 2023-02-20 DIAGNOSIS — G47.30 INSOMNIA WITH SLEEP APNEA: Primary | ICD-10-CM

## 2023-02-20 DIAGNOSIS — G47.00 INSOMNIA WITH SLEEP APNEA: ICD-10-CM

## 2023-02-20 DIAGNOSIS — Z99.89 DEPENDENCE ON OTHER ENABLING MACHINES AND DEVICES: ICD-10-CM

## 2023-02-20 DIAGNOSIS — G47.30 INSOMNIA WITH SLEEP APNEA: ICD-10-CM

## 2023-02-20 DIAGNOSIS — G47.33 OSA ON CPAP: Primary | ICD-10-CM

## 2023-02-20 PROCEDURE — 3074F SYST BP LT 130 MM HG: CPT | Performed by: PSYCHIATRY & NEUROLOGY

## 2023-02-20 PROCEDURE — 3079F DIAST BP 80-89 MM HG: CPT | Performed by: PSYCHIATRY & NEUROLOGY

## 2023-02-20 PROCEDURE — 99214 OFFICE O/P EST MOD 30 MIN: CPT | Performed by: PSYCHIATRY & NEUROLOGY

## 2023-02-20 PROCEDURE — 1123F ACP DISCUSS/DSCN MKR DOCD: CPT | Performed by: PSYCHIATRY & NEUROLOGY

## 2023-02-20 RX ORDER — DOXEPIN HYDROCHLORIDE 3 MG/1
3 TABLET ORAL NIGHTLY
Qty: 30 TABLET | Refills: 5 | Status: SHIPPED | OUTPATIENT
Start: 2023-02-20 | End: 2023-02-20

## 2023-02-20 RX ORDER — DOXEPIN HYDROCHLORIDE 3 MG/1
TABLET ORAL
Qty: 30 TABLET | Refills: 5 | Status: SHIPPED | OUTPATIENT
Start: 2023-02-20

## 2023-02-20 ASSESSMENT — SLEEP AND FATIGUE QUESTIONNAIRES
HOW LIKELY ARE YOU TO NOD OFF OR FALL ASLEEP WHILE SITTING AND TALKING TO SOMEONE: 0
HOW LIKELY ARE YOU TO NOD OFF OR FALL ASLEEP WHILE SITTING INACTIVE IN A PUBLIC PLACE: 0
HOW LIKELY ARE YOU TO NOD OFF OR FALL ASLEEP WHILE LYING DOWN TO REST IN THE AFTERNOON WHEN CIRCUMSTANCES PERMIT: 3
HOW LIKELY ARE YOU TO NOD OFF OR FALL ASLEEP WHEN YOU ARE A PASSENGER IN A CAR FOR AN HOUR WITHOUT A BREAK: 0
HOW LIKELY ARE YOU TO NOD OFF OR FALL ASLEEP WHILE SITTING AND READING: 1
HOW LIKELY ARE YOU TO NOD OFF OR FALL ASLEEP WHILE SITTING QUIETLY AFTER LUNCH WITHOUT ALCOHOL: 0
HOW LIKELY ARE YOU TO NOD OFF OR FALL ASLEEP IN A CAR, WHILE STOPPED FOR A FEW MINUTES IN TRAFFIC: 0
ESS TOTAL SCORE: 6
HOW LIKELY ARE YOU TO NOD OFF OR FALL ASLEEP WHILE WATCHING TV: 2

## 2023-02-20 NOTE — PROGRESS NOTES
MD JENNIFER Garland Board Certified in Sleep Medicine  Certified in [address] [place] [address] Certified in Neurology Ariana Geeta Fair [address] [facility] [place],  Babak Rodriguez 66  [phone]   [address] [place] [address]                      [address] [facility]  [address] [address] [phone]    Subjective:     Patient ID: Tal Ferris is a 75 y.o. male. Chief Complaint   Patient presents with    Follow-up    Sleep Apnea         HPI:        Tal Ferris is a 75 y.o. male was seen today as a follow for obstructive sleep apnea. The patient underwent comprehensive polysomnogram on 10/25/2022, the overnight registration revealed severe obstructive sleep apnea with apnea hypopnea index of 67.8/hr with lowest O2 saturation of 68%, patient spent about  234.9 minutes below 90% (weight was 292 pounds). Subsequently, the patient underwent successful PAP titration on 11/28/2022, the lowest O2 saturation while on PAP was 91%. Patient is using the PAP machine about 41% of the time, more than 4 hours a nightabout  36 %, in total average of 7:44 hours a night in last 22 days. Currently on PAP at 8.8 cm (6-9), the AHI is only 1.6 events per hour at this pressure. Patient improved regarding daytime sleepiness and fatigue, wakes up refreshed in the morning. The Patient scored Florence Sleepiness Score: 6 on Florence Sleepiness Scale ( more than 10 is indicative of daytime sleepiness)   Had tried 4 mask, 2 FFM and nasal pillow and one nasal mask.    Can not breathe against the pressure despite of we lowered it to APAP 6 and 9 cm, also has insomnia with CPAP  DOT/CDL - N/A        Previous Report(s)Reviewed: historical medical records         Social History     Socioeconomic History    Marital status:      Spouse name: Reena Lopez Number of children: 4    Years of education: Not on file    Highest education level: Not on file   Occupational History    Occupation: Retired--Construction    Tobacco Use    Smoking status: Never     Passive exposure: Past    Smokeless tobacco: Never    Tobacco comments:     counseled on tobacco exposure avoidance   Vaping Use    Vaping Use: Never used   Substance and Sexual Activity    Alcohol use: Yes     Comment: occ    Drug use: No    Sexual activity: Yes     Partners: Female   Other Topics Concern    Not on file   Social History Narrative    Not on file     Social Determinants of Health     Financial Resource Strain: Low Risk     Difficulty of Paying Living Expenses: Not hard at all   Food Insecurity: No Food Insecurity    Worried About Running Out of Food in the Last Year: Never true    [address] in the Last Year: Never true   Transportation Needs: Not on file   Physical Activity: Insufficiently Active    Days of Exercise per Week: 7 days    Minutes of Exercise per Session: 10 min   Stress: Not on file   Social Connections: Not on file   Intimate Partner Violence: Not on file   Housing Stability: Not on file       Prior to Admission medications    Medication Sig Start Date End Date Taking?  Authorizing Provider   doxepin (SILENOR) 3 MG TABS tablet One tab at the bedtime 2/20/23  Yes Rocael Hadring MD   losartan-hydroCHLOROthiazide (HYZAAR) 50-12.5 MG per tablet TAKE ONE TABLET BY MOUTH DAILY 12/22/22  Yes Ann Marie Kuo MD   furosemide (LASIX) 20 MG tablet TAKE ONE TABLET BY MOUTH EVERY MORNING AS NEEDED FOR LEG SWELLING 10/24/22  Yes Glenny Kapadia MD   ferrous sulfate (FE TABS 325) 325 (65 Fe) MG EC tablet Takes 45 mg over the counter per hematology   10/24/22 10/24/22  Yes Glenny Kapadia MD   rivaroxaban (Noelle Barron) 20 MG TABS tablet Take 20 mg by mouth daily (with breakfast) 10/24/22  Yes Glenny Kapadia MD   metoprolol succinate (TOPROL XL) 100 MG extended release tablet 1 tablet DAILY (route: oral) 9/13/22  Yes Historical Provider, MD   finasteride (PROSCAR) 5 MG tablet 1 tablet DAILY (route: oral) 9/13/22  Yes Historical Provider, MD   tamsulosin (FLOMAX) 0.4 MG capsule  9/19/22  Yes Historical Provider, MD   acetaminophen (TYLENOL) 325 MG tablet Take 650 mg by mouth every 6 hours as needed 9/12/22  Yes Historical Provider, MD   vitamin D (CHOLECALCIFEROL) 25 MCG (1000 UT) TABS tablet Take 2,000 Units by mouth daily   Yes Historical Provider, MD   traMADol (ULTRAM) 50 MG tablet Take 50 mg by mouth every 6 hours as needed. 8/15/22  Yes Historical Provider, MD   levothyroxine (SYNTHROID) 150 MCG tablet Take 150 mcg by mouth Daily   Yes Historical Provider, MD   Multiple Vitamins-Minerals (MULTIVITAL) TABS Take 1 tablet by mouth daily.    Yes Historical Provider, MD       Allergies as of 02/20/2023 - Fully Reviewed 02/20/2023   Allergen Reaction Noted    No known allergies  08/07/2014       Patient Active Problem List   Diagnosis    Colonic polyps(LAST COLO 5/15--pos mult small polyps--REPEAT 5 YR)--dr lynn    Tinnitus,CHRONIC    Hydrocele, left    Spermatocele,LEFT    Allergic rhinitis, seasonal    Gynecomastia, male    Diverticulitis of colon    Baker's cyst of knee-left    Primary localized osteoarthrosis, lower leg    DVT, lower extremity--post op--5/13 (s/p knee), bilat 9/4/22 after back surgery    Spinal stenosis-pos affecting bilat lower legs--saw dr Anni Light at [facility] 10/13--s/p surgery    Vitamin D deficiency--started 50,000 iu 2x/ wk 4/14    Neuropathic pain of both legs (HCC)--dr kelley for w/u 7/14    Idiopathic chronic gout of ankle without tophus    Right-sided low back pain with right-sided  sciatica -gabapentin  had SE, cymbalta(20 mg, SE with  30mg)PT helped Chilo Walker at [facility]    Right hip pain    Other specified hypothyroidism    Nephrolithiasis--right kidney 6/16---? symptomatic from them-sent to dr Radha Rodriguez 6/16    Abnormal finding of kidney--left kidney chronically shrunken--? etiol--nl renal fx 6/16    Subdural hematoma (HCC)-1/9/18    Elevated BP without diagnosis of hypertension    Essential hypertension on hyzaar    Lumbar radiculopathy    Lesion of subcutaneous tissue    Lipoma of left upper extremity    Hyperglycemia    Osteopenia    Acute deep vein thrombosis (DVT) of distal vein of both lower extremities ([address] 9/4/22 bilat after surgery back    YOVANI (obstructive sleep apnea)    Atrial fibrillation, unspecified type ([address]       Past Medical History:   Diagnosis Date    Allergic rhinitis, seasonal     Bacterial meningitis     HX OF     Congenital absence of kidney     born with one kidney    Diverticulitis, colon     Gout     Gynecomastia     Hx of blood clots     postop knee replacement    Hydrocele, left     Lumbar disc disorder     Sleep apnea     Spermatocele     LEFT    Talipes cavus     Tinnitus     Wears contact lenses        Past Surgical History:   Procedure Laterality Date    BREAST LUMPECTOMY Left 08/12/2014    lipoma    BREAST SURGERY Right 2/4/15    removal of lipoma right breast.    CHOLECYSTECTOMY      COLECTOMY  1996    partial for diverticulitis    COLONOSCOPY      HYDROCELE EXCISION      and spermatacele excision    KNEE ARTHROSCOPY  9/12    left --dr Jaime Herrera S/P EXCISION     OTHER SURGICAL HISTORY  10/21/2013    BILATERAL DECOMPRESSIVE LUMBAR LAMINECTOMY L4-L5, POSSIBLE    SPINAL CORD STIMULATOR IMPLANTATION N/A 4/8/2019    SPINAL CORD STIMULATOR  TRIAL WITH NEVRO USING FLUOROSCOPY performed by Finesse Martinez MD at [facility] Left 5/17/13    [facility]        Family History   Problem Relation Age of Onset    Cancer Mother 59        LUNG    Other Father 72        AORTIC ANEURYSM    Cancer Sister         BREAST     Other Brother         HIV    Arthritis Other     Kidney Disease Other        Review of Systems    Objective:     Vitals:  Weight BMI Neck circumference    Wt Readings from Last 3 Encounters:   02/20/23 288 lb 6.4 oz (130.8 kg)   01/24/23 284 lb (128.8 kg)   10/24/22 285 lb (129.3 kg)    Body mass index is 43.85 kg/m². BP HR SaO2   BP Readings from Last 3 Encounters:   02/20/23 120/80   01/24/23 118/62   10/24/22 (!) 132/58    Pulse Readings from Last 3 Encounters:   02/20/23 83   01/24/23 69   10/24/22 75    SpO2 Readings from Last 3 Encounters:   02/20/23 97%   01/24/23 95%   10/24/22 95%        Themandibular molar Class :   [x]1 []2 []3      Mallampati I Airway Classification:   []1 []2 []3 [x]4      Physical Exam  Vitals and nursing note reviewed. Constitutional:       Appearance: He is obese. Cardiovascular:      Rate and Rhythm: Normal rate and regular rhythm. Pulmonary:      Effort: Pulmonary effort is normal.      Breath sounds: Normal breath sounds.       :   Severe Obstructive Sleep Apnea/Hypopnea Syndrome ,   Insomnia with the CPAP  Problem exhaling despite of we lowered the pressure    Diagnosis Orders   1. YOVANI on CPAP        2. Dependence on other enabling machines and devices        3. Insomnia with sleep apnea  doxepin (SILENOR) 3 MG TABS tablet        Plan: Will continue the PAP at 6-9 cmwp. Will try Doxepin 3 mg  I will consider BiPAP titration next visit. I will order PAP supplies, mask, filters. ... Most likely treating the YOVANI will have positive impact on A-Fib control. No orders of the defined types were placed in this encounter. Return in about 6 weeks (around 4/3/2023) for Reveiwing CPAP usage and compliance report and tro.     Bobbi Mejia MD  Medical Director - [facility]

## 2023-04-03 ENCOUNTER — OFFICE VISIT (OUTPATIENT)
Dept: SLEEP MEDICINE | Age: 73
End: 2023-04-03
Payer: MEDICARE

## 2023-04-03 VITALS
DIASTOLIC BLOOD PRESSURE: 80 MMHG | OXYGEN SATURATION: 98 % | HEART RATE: 70 BPM | TEMPERATURE: 96.9 F | RESPIRATION RATE: 18 BRPM | HEIGHT: 68 IN | BODY MASS INDEX: 44.5 KG/M2 | WEIGHT: 293.6 LBS | SYSTOLIC BLOOD PRESSURE: 130 MMHG

## 2023-04-03 DIAGNOSIS — E66.01 CLASS 3 SEVERE OBESITY DUE TO EXCESS CALORIES WITH SERIOUS COMORBIDITY AND BODY MASS INDEX (BMI) OF 40.0 TO 44.9 IN ADULT (HCC): ICD-10-CM

## 2023-04-03 DIAGNOSIS — G47.00 INSOMNIA WITH SLEEP APNEA: ICD-10-CM

## 2023-04-03 DIAGNOSIS — Z99.89 OSA ON CPAP: Primary | ICD-10-CM

## 2023-04-03 DIAGNOSIS — G47.33 OSA ON CPAP: Primary | ICD-10-CM

## 2023-04-03 DIAGNOSIS — G47.30 INSOMNIA WITH SLEEP APNEA: ICD-10-CM

## 2023-04-03 DIAGNOSIS — Z99.89 DEPENDENCE ON OTHER ENABLING MACHINES AND DEVICES: ICD-10-CM

## 2023-04-03 PROCEDURE — 99214 OFFICE O/P EST MOD 30 MIN: CPT | Performed by: PSYCHIATRY & NEUROLOGY

## 2023-04-03 PROCEDURE — 3079F DIAST BP 80-89 MM HG: CPT | Performed by: PSYCHIATRY & NEUROLOGY

## 2023-04-03 PROCEDURE — 3075F SYST BP GE 130 - 139MM HG: CPT | Performed by: PSYCHIATRY & NEUROLOGY

## 2023-04-03 PROCEDURE — 1123F ACP DISCUSS/DSCN MKR DOCD: CPT | Performed by: PSYCHIATRY & NEUROLOGY

## 2023-04-03 ASSESSMENT — SLEEP AND FATIGUE QUESTIONNAIRES
HOW LIKELY ARE YOU TO NOD OFF OR FALL ASLEEP WHILE WATCHING TV: 1
HOW LIKELY ARE YOU TO NOD OFF OR FALL ASLEEP WHILE SITTING INACTIVE IN A PUBLIC PLACE: 1
HOW LIKELY ARE YOU TO NOD OFF OR FALL ASLEEP WHILE LYING DOWN TO REST IN THE AFTERNOON WHEN CIRCUMSTANCES PERMIT: 3
HOW LIKELY ARE YOU TO NOD OFF OR FALL ASLEEP WHILE SITTING AND TALKING TO SOMEONE: 0
HOW LIKELY ARE YOU TO NOD OFF OR FALL ASLEEP WHEN YOU ARE A PASSENGER IN A CAR FOR AN HOUR WITHOUT A BREAK: 0
HOW LIKELY ARE YOU TO NOD OFF OR FALL ASLEEP IN A CAR, WHILE STOPPED FOR A FEW MINUTES IN TRAFFIC: 0
ESS TOTAL SCORE: 7
HOW LIKELY ARE YOU TO NOD OFF OR FALL ASLEEP WHILE SITTING QUIETLY AFTER LUNCH WITHOUT ALCOHOL: 1
HOW LIKELY ARE YOU TO NOD OFF OR FALL ASLEEP WHILE SITTING AND READING: 1

## 2023-04-03 NOTE — PROGRESS NOTES
Historical Provider, MD   acetaminophen (TYLENOL) 325 MG tablet Take 2 tablets by mouth every 6 hours as needed 9/12/22  Yes Historical Provider, MD   vitamin D (CHOLECALCIFEROL) 25 MCG (1000 UT) TABS tablet Take 2 tablets by mouth daily   Yes Historical Provider, MD   traMADol (ULTRAM) 50 MG tablet Take 1 tablet by mouth every 6 hours as needed. 8/15/22  Yes Historical Provider, MD   levothyroxine (SYNTHROID) 150 MCG tablet Take 1 tablet by mouth Daily   Yes Historical Provider, MD   Multiple Vitamins-Minerals (MULTIVITAL) TABS Take 1 tablet by mouth daily.    Yes Historical Provider, MD       Allergies as of 04/03/2023 - Fully Reviewed 04/03/2023   Allergen Reaction Noted    No known allergies  08/07/2014       Patient Active Problem List   Diagnosis    Colonic polyps(LAST COLO 5/15--pos mult small polyps--REPEAT 5 YR)--dr lynn    Tinnitus,CHRONIC    Hydrocele, left    Spermatocele,LEFT    Allergic rhinitis, seasonal    Gynecomastia, male    Diverticulitis of colon    Baker's cyst of knee-left    Primary localized osteoarthrosis, lower leg    DVT, lower extremity--post op--5/13 (s/p knee), bilat 9/4/22 after back surgery    Spinal stenosis-pos affecting bilat lower legs--saw dr Rebecca Motta [facility] 10/13--s/p surgery    Vitamin D deficiency--started 50,000 iu 2x/ wk 4/14    Neuropathic pain of both legs (HCC)--dr kelley for w/u 7/14    Idiopathic chronic gout of ankle without tophus    Right-sided low back pain with right-sided  sciatica -gabapentin  had SE, cymbalta(20 mg, SE with  30mg)PT helped Rosendo Gray at [place]    Right hip pain    Other specified hypothyroidism    Nephrolithiasis--right kidney 6/16---? symptomatic from them-sent to dr Mccabe Apt 6/16    Abnormal finding of kidney--left kidney chronically shrunken--? etiol--nl renal fx 6/16    Subdural hematoma (HCC)-1/9/18    Elevated BP without diagnosis of hypertension    Essential hypertension on hyzaar    Lumbar radiculopathy    Lesion of subcutaneous

## 2023-05-15 RX ORDER — LEVOTHYROXINE SODIUM 0.15 MG/1
TABLET ORAL
Qty: 90 TABLET | Refills: 0 | Status: SHIPPED | OUTPATIENT
Start: 2023-05-15

## 2023-06-21 RX ORDER — LOSARTAN POTASSIUM AND HYDROCHLOROTHIAZIDE 12.5; 5 MG/1; MG/1
TABLET ORAL
Qty: 90 TABLET | Refills: 1 | Status: SHIPPED | OUTPATIENT
Start: 2023-06-21

## 2023-07-22 SDOH — ECONOMIC STABILITY: HOUSING INSECURITY
IN THE LAST 12 MONTHS, WAS THERE A TIME WHEN YOU DID NOT HAVE A STEADY PLACE TO SLEEP OR SLEPT IN A SHELTER (INCLUDING NOW)?: NO

## 2023-07-22 SDOH — ECONOMIC STABILITY: FOOD INSECURITY: WITHIN THE PAST 12 MONTHS, YOU WORRIED THAT YOUR FOOD WOULD RUN OUT BEFORE YOU GOT MONEY TO BUY MORE.: NEVER TRUE

## 2023-07-22 SDOH — ECONOMIC STABILITY: FOOD INSECURITY: WITHIN THE PAST 12 MONTHS, THE FOOD YOU BOUGHT JUST DIDN'T LAST AND YOU DIDN'T HAVE MONEY TO GET MORE.: NEVER TRUE

## 2023-07-22 SDOH — ECONOMIC STABILITY: INCOME INSECURITY: HOW HARD IS IT FOR YOU TO PAY FOR THE VERY BASICS LIKE FOOD, HOUSING, MEDICAL CARE, AND HEATING?: NOT HARD AT ALL

## 2023-07-24 ENCOUNTER — OFFICE VISIT (OUTPATIENT)
Dept: FAMILY MEDICINE CLINIC | Age: 73
End: 2023-07-24
Payer: MEDICARE

## 2023-07-24 VITALS
RESPIRATION RATE: 16 BRPM | SYSTOLIC BLOOD PRESSURE: 132 MMHG | WEIGHT: 286 LBS | DIASTOLIC BLOOD PRESSURE: 68 MMHG | BODY MASS INDEX: 43.35 KG/M2 | HEART RATE: 82 BPM | HEIGHT: 68 IN | OXYGEN SATURATION: 92 %

## 2023-07-24 DIAGNOSIS — R60.0 BILATERAL LEG EDEMA: ICD-10-CM

## 2023-07-24 DIAGNOSIS — E03.9 HYPOTHYROIDISM, UNSPECIFIED TYPE: ICD-10-CM

## 2023-07-24 DIAGNOSIS — E55.9 VITAMIN D DEFICIENCY: ICD-10-CM

## 2023-07-24 DIAGNOSIS — I48.91 ATRIAL FIBRILLATION, UNSPECIFIED TYPE (HCC): ICD-10-CM

## 2023-07-24 DIAGNOSIS — Z13.220 SCREENING FOR LIPID DISORDERS: ICD-10-CM

## 2023-07-24 DIAGNOSIS — R73.03 PREDIABETES: ICD-10-CM

## 2023-07-24 DIAGNOSIS — I10 ESSENTIAL HYPERTENSION: Primary | ICD-10-CM

## 2023-07-24 PROCEDURE — 1123F ACP DISCUSS/DSCN MKR DOCD: CPT | Performed by: INTERNAL MEDICINE

## 2023-07-24 PROCEDURE — 99214 OFFICE O/P EST MOD 30 MIN: CPT | Performed by: INTERNAL MEDICINE

## 2023-07-24 PROCEDURE — 3078F DIAST BP <80 MM HG: CPT | Performed by: INTERNAL MEDICINE

## 2023-07-24 PROCEDURE — 3075F SYST BP GE 130 - 139MM HG: CPT | Performed by: INTERNAL MEDICINE

## 2023-07-24 RX ORDER — SEMAGLUTIDE 1.34 MG/ML
INJECTION, SOLUTION SUBCUTANEOUS
COMMUNITY
Start: 2023-07-07

## 2023-07-24 NOTE — PROGRESS NOTES
Bobbi Caban (:  1950) is a 75 y.o. male, here for evaluation of the following chief complaint(s):  Hypertension (Patient is here for a 6 month follow up )  Mira Ibarra was seen today for hypertension. Diagnoses and all orders for this visit:    Essential hypertension on hyzaar hctz  -     Comprehensive Metabolic Panel; Future    Atrial fibrillation, unspecified type ([address])  -     CBC with Auto Differential; Future    Hypothyroidism, unspecified type  -     TSH with Reflex; Future    Vitamin D deficiency  -     Vitamin D 25 Hydroxy; Future    Bilateral leg edema    Screening for lipid disorders  -     Lipid Panel; Future    Prediabetes  -     Hemoglobin A1C; Future     Come back tomorrow fasting , ok for lots of water. Needs labs check  No rx refills now  On ozemic  1mg currently with cardiologist  On mv and vit d pill  Depending on labs  No change in bp med   Bp stable  Needs to be careful with ozempic  Follow up awe           Subjective   SUBJECTIVE/OBJECTIVE:  Hypertension      Back is 95% better. Had surgery in aug/sept last yr    Here for bp  htn  Bp good today  On losartan /hctz  Cardiology also give metoprolol      On ozempic from cardiology for prediabetes lost 10 lbs. On it 2 months  On third full dose. Dr Jeff Dixon , busy today,  not a lot of liquid      Only has one kidney that works. Saw vannessa was told to be on blood thinner the rest of his life.     Had afib in the hospital with the back surgery      Hemoglobin A1C (%)   Date Value   12/10/2021 5.6   2021 5.3   2021 5.8       Sodium (mmol/L)   Date Value   2023 141   10/24/2022 142   10/03/2022 141     Potassium (mmol/L)   Date Value   2023 4.3   10/24/2022 4.6   10/03/2022 4.3     Chloride (mmol/L)   Date Value   2023 102   10/24/2022 103   10/03/2022 103     CO2 (mmol/L)   Date Value   2023 26   10/24/2022 27   10/03/2022 25     BUN (mg/dL)   Date Value   2023 24 (H)   10/24/2022 15

## 2023-07-27 DIAGNOSIS — Z13.220 SCREENING FOR LIPID DISORDERS: ICD-10-CM

## 2023-07-27 DIAGNOSIS — E03.9 HYPOTHYROIDISM, UNSPECIFIED TYPE: ICD-10-CM

## 2023-07-27 DIAGNOSIS — R73.03 PREDIABETES: ICD-10-CM

## 2023-07-27 DIAGNOSIS — E55.9 VITAMIN D DEFICIENCY: ICD-10-CM

## 2023-07-27 DIAGNOSIS — I48.91 ATRIAL FIBRILLATION, UNSPECIFIED TYPE (HCC): ICD-10-CM

## 2023-07-27 DIAGNOSIS — I10 ESSENTIAL HYPERTENSION: ICD-10-CM

## 2023-07-27 LAB
25(OH)D3 SERPL-MCNC: 39.5 NG/ML
ALBUMIN SERPL-MCNC: 4.3 G/DL (ref 3.4–5)
ALBUMIN/GLOB SERPL: 2 {RATIO} (ref 1.1–2.2)
ALP SERPL-CCNC: 79 U/L (ref 40–129)
ALT SERPL-CCNC: 15 U/L (ref 10–40)
ANION GAP SERPL CALCULATED.3IONS-SCNC: 11 MMOL/L (ref 3–16)
AST SERPL-CCNC: 16 U/L (ref 15–37)
BASOPHILS # BLD: 0 K/UL (ref 0–0.2)
BASOPHILS NFR BLD: 0.3 %
BILIRUB SERPL-MCNC: 0.7 MG/DL (ref 0–1)
BUN SERPL-MCNC: 12 MG/DL (ref 7–20)
CALCIUM SERPL-MCNC: 9 MG/DL (ref 8.3–10.6)
CHLORIDE SERPL-SCNC: 102 MMOL/L (ref 99–110)
CHOLEST SERPL-MCNC: 125 MG/DL (ref 0–199)
CO2 SERPL-SCNC: 25 MMOL/L (ref 21–32)
CREAT SERPL-MCNC: 1.1 MG/DL (ref 0.8–1.3)
DEPRECATED RDW RBC AUTO: 14.4 % (ref 12.4–15.4)
EOSINOPHIL # BLD: 0.2 K/UL (ref 0–0.6)
EOSINOPHIL NFR BLD: 2.3 %
EST. AVERAGE GLUCOSE BLD GHB EST-MCNC: 105.4 MG/DL
GFR SERPLBLD CREATININE-BSD FMLA CKD-EPI: >60 ML/MIN/{1.73_M2}
GLUCOSE SERPL-MCNC: 126 MG/DL (ref 70–99)
HBA1C MFR BLD: 5.3 %
HCT VFR BLD AUTO: 41.7 % (ref 40.5–52.5)
HDLC SERPL-MCNC: 50 MG/DL (ref 40–60)
HGB BLD-MCNC: 14.4 G/DL (ref 13.5–17.5)
LDLC SERPL CALC-MCNC: 55 MG/DL
LYMPHOCYTES # BLD: 2.7 K/UL (ref 1–5.1)
LYMPHOCYTES NFR BLD: 34.4 %
MCH RBC QN AUTO: 32.8 PG (ref 26–34)
MCHC RBC AUTO-ENTMCNC: 34.5 G/DL (ref 31–36)
MCV RBC AUTO: 95.1 FL (ref 80–100)
MONOCYTES # BLD: 0.6 K/UL (ref 0–1.3)
MONOCYTES NFR BLD: 7.6 %
NEUTROPHILS # BLD: 4.4 K/UL (ref 1.7–7.7)
NEUTROPHILS NFR BLD: 55.4 %
PLATELET # BLD AUTO: 220 K/UL (ref 135–450)
PMV BLD AUTO: 9.6 FL (ref 5–10.5)
POTASSIUM SERPL-SCNC: 4.2 MMOL/L (ref 3.5–5.1)
PROT SERPL-MCNC: 6.5 G/DL (ref 6.4–8.2)
RBC # BLD AUTO: 4.38 M/UL (ref 4.2–5.9)
SODIUM SERPL-SCNC: 138 MMOL/L (ref 136–145)
TRIGL SERPL-MCNC: 100 MG/DL (ref 0–150)
TSH SERPL DL<=0.005 MIU/L-ACNC: 1.79 UIU/ML (ref 0.27–4.2)
VLDLC SERPL CALC-MCNC: 20 MG/DL
WBC # BLD AUTO: 7.9 K/UL (ref 4–11)

## 2023-08-25 DIAGNOSIS — E03.9 HYPOTHYROIDISM, UNSPECIFIED TYPE: Primary | ICD-10-CM

## 2023-08-25 RX ORDER — LEVOTHYROXINE SODIUM 0.15 MG/1
150 TABLET ORAL DAILY
Qty: 90 TABLET | Refills: 3 | Status: SHIPPED | OUTPATIENT
Start: 2023-08-25

## 2023-10-07 SDOH — HEALTH STABILITY: PHYSICAL HEALTH: ON AVERAGE, HOW MANY MINUTES DO YOU ENGAGE IN EXERCISE AT THIS LEVEL?: 0 MIN

## 2023-10-07 SDOH — HEALTH STABILITY: PHYSICAL HEALTH: ON AVERAGE, HOW MANY DAYS PER WEEK DO YOU ENGAGE IN MODERATE TO STRENUOUS EXERCISE (LIKE A BRISK WALK)?: 0 DAYS

## 2023-10-07 ASSESSMENT — PATIENT HEALTH QUESTIONNAIRE - PHQ9
SUM OF ALL RESPONSES TO PHQ QUESTIONS 1-9: 0
1. LITTLE INTEREST OR PLEASURE IN DOING THINGS: 0
2. FEELING DOWN, DEPRESSED OR HOPELESS: 0
SUM OF ALL RESPONSES TO PHQ QUESTIONS 1-9: 0
SUM OF ALL RESPONSES TO PHQ9 QUESTIONS 1 & 2: 0

## 2023-10-07 ASSESSMENT — LIFESTYLE VARIABLES
HOW MANY STANDARD DRINKS CONTAINING ALCOHOL DO YOU HAVE ON A TYPICAL DAY: 1
HOW OFTEN DO YOU HAVE SIX OR MORE DRINKS ON ONE OCCASION: 1
HOW OFTEN DO YOU HAVE A DRINK CONTAINING ALCOHOL: 4
HOW OFTEN DO YOU HAVE A DRINK CONTAINING ALCOHOL: 2-3 TIMES A WEEK
HOW MANY STANDARD DRINKS CONTAINING ALCOHOL DO YOU HAVE ON A TYPICAL DAY: 1 OR 2

## 2023-10-10 ENCOUNTER — OFFICE VISIT (OUTPATIENT)
Dept: FAMILY MEDICINE CLINIC | Age: 73
End: 2023-10-10
Payer: MEDICARE

## 2023-10-10 VITALS
DIASTOLIC BLOOD PRESSURE: 72 MMHG | SYSTOLIC BLOOD PRESSURE: 128 MMHG | BODY MASS INDEX: 42.74 KG/M2 | OXYGEN SATURATION: 94 % | RESPIRATION RATE: 16 BRPM | WEIGHT: 282 LBS | HEIGHT: 68 IN | HEART RATE: 86 BPM

## 2023-10-10 DIAGNOSIS — R22.32 ARM MASS, LEFT: ICD-10-CM

## 2023-10-10 DIAGNOSIS — H91.90 HEARING LOSS, UNSPECIFIED HEARING LOSS TYPE, UNSPECIFIED LATERALITY: ICD-10-CM

## 2023-10-10 DIAGNOSIS — Z00.00 ENCOUNTER FOR ANNUAL WELLNESS EXAM IN MEDICARE PATIENT: Primary | ICD-10-CM

## 2023-10-10 DIAGNOSIS — Z00.00 MEDICARE ANNUAL WELLNESS VISIT, SUBSEQUENT: ICD-10-CM

## 2023-10-10 DIAGNOSIS — R42 DIZZINESS: ICD-10-CM

## 2023-10-10 DIAGNOSIS — I10 ESSENTIAL HYPERTENSION: ICD-10-CM

## 2023-10-10 DIAGNOSIS — Z23 NEED FOR INFLUENZA VACCINATION: ICD-10-CM

## 2023-10-10 LAB
ALBUMIN SERPL-MCNC: 4.6 G/DL (ref 3.4–5)
ANION GAP SERPL CALCULATED.3IONS-SCNC: 12 MMOL/L (ref 3–16)
BUN SERPL-MCNC: 14 MG/DL (ref 7–20)
CALCIUM SERPL-MCNC: 9 MG/DL (ref 8.3–10.6)
CHLORIDE SERPL-SCNC: 102 MMOL/L (ref 99–110)
CO2 SERPL-SCNC: 27 MMOL/L (ref 21–32)
CREAT SERPL-MCNC: 1.1 MG/DL (ref 0.8–1.3)
GFR SERPLBLD CREATININE-BSD FMLA CKD-EPI: >60 ML/MIN/{1.73_M2}
GLUCOSE SERPL-MCNC: 118 MG/DL (ref 70–99)
PHOSPHATE SERPL-MCNC: 3.7 MG/DL (ref 2.5–4.9)
POTASSIUM SERPL-SCNC: 4.2 MMOL/L (ref 3.5–5.1)
SODIUM SERPL-SCNC: 141 MMOL/L (ref 136–145)

## 2023-10-10 PROCEDURE — G0439 PPPS, SUBSEQ VISIT: HCPCS | Performed by: INTERNAL MEDICINE

## 2023-10-10 PROCEDURE — 99214 OFFICE O/P EST MOD 30 MIN: CPT | Performed by: INTERNAL MEDICINE

## 2023-10-10 PROCEDURE — 3074F SYST BP LT 130 MM HG: CPT | Performed by: INTERNAL MEDICINE

## 2023-10-10 PROCEDURE — G0008 ADMIN INFLUENZA VIRUS VAC: HCPCS | Performed by: INTERNAL MEDICINE

## 2023-10-10 PROCEDURE — 1123F ACP DISCUSS/DSCN MKR DOCD: CPT | Performed by: INTERNAL MEDICINE

## 2023-10-10 PROCEDURE — 3078F DIAST BP <80 MM HG: CPT | Performed by: INTERNAL MEDICINE

## 2023-10-10 PROCEDURE — 90694 VACC AIIV4 NO PRSRV 0.5ML IM: CPT | Performed by: INTERNAL MEDICINE

## 2023-10-10 RX ORDER — MECLIZINE HYDROCHLORIDE 25 MG/1
25 TABLET ORAL 3 TIMES DAILY PRN
Qty: 20 TABLET | Refills: 0 | Status: SHIPPED | OUTPATIENT
Start: 2023-10-10

## 2023-10-23 ENCOUNTER — PROCEDURE VISIT (OUTPATIENT)
Dept: AUDIOLOGY | Age: 73
End: 2023-10-23
Payer: MEDICARE

## 2023-10-23 ENCOUNTER — OFFICE VISIT (OUTPATIENT)
Dept: ENT CLINIC | Age: 73
End: 2023-10-23
Payer: MEDICARE

## 2023-10-23 VITALS
SYSTOLIC BLOOD PRESSURE: 137 MMHG | BODY MASS INDEX: 43.03 KG/M2 | WEIGHT: 283 LBS | DIASTOLIC BLOOD PRESSURE: 77 MMHG | HEART RATE: 76 BPM | OXYGEN SATURATION: 93 %

## 2023-10-23 DIAGNOSIS — H93.13 TINNITUS OF BOTH EARS: ICD-10-CM

## 2023-10-23 DIAGNOSIS — H90.3 SENSORINEURAL HEARING LOSS (SNHL) OF BOTH EARS: Primary | ICD-10-CM

## 2023-10-23 PROCEDURE — 3075F SYST BP GE 130 - 139MM HG: CPT | Performed by: OTOLARYNGOLOGY

## 2023-10-23 PROCEDURE — 3078F DIAST BP <80 MM HG: CPT | Performed by: OTOLARYNGOLOGY

## 2023-10-23 PROCEDURE — 92567 TYMPANOMETRY: CPT | Performed by: AUDIOLOGIST

## 2023-10-23 PROCEDURE — 99203 OFFICE O/P NEW LOW 30 MIN: CPT | Performed by: OTOLARYNGOLOGY

## 2023-10-23 PROCEDURE — 1123F ACP DISCUSS/DSCN MKR DOCD: CPT | Performed by: OTOLARYNGOLOGY

## 2023-10-23 PROCEDURE — 92557 COMPREHENSIVE HEARING TEST: CPT | Performed by: AUDIOLOGIST

## 2023-10-23 NOTE — PROGRESS NOTES
Chris Paul   1950, 75 y.o. male   [id number]       Referring Provider: Cory Olson MD  Referral Type: In an order in [address] [place] [address]    Reason for Visit: Evaluation of suspected change in hearing, tinnitus, or balance. ADULT AUDIOLOGIC EVALUATION      Chris Paul is a 75 y.o. male seen today, 10/23/2023 , for an initial audiologic evaluation. Patient was seen by Van Shook MD following today's evaluation. AUDIOLOGIC AND OTHER PERTINENT MEDICAL HISTORY:      Chris Paul noted tinnitus and history of occupational noise exposure. Patient reports known bilateral SNHL. He states he tried hearing aids about 15 years ago but did not perceive good benefit at the time. He notes bilateral tinnitus that is constant in nature. 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 Chris Paul denied otalgia, aural fullness, otorrhea, dizziness, imbalance, history of falls, history of head trauma, history of ear surgery, and family history of hearing loss. Date: 10/23/2023     IMPRESSIONS:      AU: Hearing WNL sloping to Moderately-Severe SNHL, Excellent WRS, Type A tymp    Test results consistent with bilateral Sensorineural hearing loss. Hearing loss significant enough to create hearing difficulty in some listening situations. Discussed hearing loss, tinnitus, and hearing aids with patient. Patient to follow medical recommendations per Van Shook MD .    ASSESSMENT AND FINDINGS:     Otoscopy revealed: Clear ear canals bilaterally    RIGHT EAR:  Hearing Sensitivity: Normal hearing sensitivity to Moderately-Severe   Sensorineural hearing loss  Speech Recognition Threshold: 30 dB HL  Word Recognition:Excellent (100%), based on NU-6 25-word list at 75m dBHL using recorded speech stimuli. Tympanometry: Normal peak pressure and compliance, Type A tympanogram, consistent with normal middle ear function.   Acoustic Reflexes: Ipsilateral: Could not

## 2023-10-23 NOTE — PATIENT INSTRUCTIONS
tested by your audiologist and to follow-up with your physician that manages your hearing loss (such as your ENT or Primary Care doctor). Noise-Induced Hearing Loss  What it is, and what you can do to prevent it      Exposure to loud sounds, in an occupational setting or recreational, can cause permanent hearing loss. Sound is measured in decibels (dB). Noise-induced hearing loss is the ONLY type of preventable hearing loss. Hearing loss related to noise exposure can occur at any age. There are small sensory cells, called inner and outer hair cells, within the inner ear (cochlea). These cells process the loudness (intensity) and pitch (frequency) of sound and send the signal to the brain via our auditory nerve (vestibulocochlear nerve, cranial nerve VIII). When these cells are damaged, they can result in permanent hearing loss and/or tinnitus. The hair cells responsible for high frequency sounds, like birds chirping, are most likely to be damaged due to loud sounds. The high frequency sounds are also very important for our clarity and understanding of speech. OCCUPATIONAL NOISE EXPOSURE RECREATIONAL NOISE EXPOSURE   Some jobs may have exposure to loud sounds in the workplace. These jobs may include but are not limited to:  [address]  Construction  Welding  Landscaping  Hairdressing/hairstyling  [address] [facility] [employer]   . .. And more! Many activities outside of work may cause permanent hearing loss. These activities may include but are not limited to:  Lawnmowers, leaf blowers  Farming equipment and animals (such as pigs squealing)  Chainsaws and other power tools  Playing musical instruments and/or singing  Listening to music too loudly - at concerts, through stereo, through ear buds or headphones  Attending sporting events  Attending fireworks shows or using fireworks at home  Use of firearms  . .. And more!        REDUCE OR PROTECT YOUR EARS FROM NOISE

## 2023-10-23 NOTE — PROGRESS NOTES
[address]      Patient Name: [address] [facility] Record Number:  [id number]  Primary Care Physician:  Elvis Verma MD  Date of Consultation: 10/23/2023    Chief Complaint: Hearing loss        HISTORY OF PRESENT ILLNESS  Dee Huynh is a(n) 75 y.o. male who presents for evaluation of hearing loss. The patient says that he has had some hearing loss for a long time. He worked in a noisy environment. He actually had hearing aids 15 years ago but did not really wear them. He also has bilateral tinnitus. He has never had ear surgery. Does not have ear pain. REVIEW OF SYSTEMS  As above    PHYSICAL EXAM  GENERAL: No Acute Distress, Alert and Oriented, no Hoarseness, strong voice  EYES: EOMI, Anti-icteric  HENT:   Head: Normocephalic and atraumatic. Face:  Symmetric, facial nerve intact, no sinus tenderness  Right Ear: Normal external ear, normal external auditory canal, intact tympanic membrane with normal mobility and aerated middle ear  Left Ear: Normal external ear, normal external auditory canal, intact tympanic membrane with normal mobility and aerated middle ear  Mouth/Oral Cavity:  normal lips, Uvula is midline, no mucosal lesions, no trismus  Oropharynx/Larynx:  normal oropharynx  Nose:Normal external nasal appearance. NECK: Normal range of motion, no thyromegaly, trachea is midline, no lymphadenopathy, no neck masses, no crepitus      Patient had an audiogram that shows normal sloping to moderate severe sensorineural hearing loss with type a tympanograms        ASSESSMENT/PLAN  1. Sensorineural hearing loss (SNHL) of both ears  This patient has significant hearing loss secondary to presbycusis and probably his history of noise exposure. I think he would benefit from hearing aids. He is going to call his insurance company to see if he has coverage. 2. Tinnitus of both ears  Secondary to hearing loss.   Typically hearing aids

## 2023-10-30 ENCOUNTER — HOSPITAL ENCOUNTER (OUTPATIENT)
Dept: MRI IMAGING | Age: 73
Discharge: HOME | End: 2023-10-30
Attending: INTERNAL MEDICINE
Payer: MEDICARE

## 2023-10-30 DIAGNOSIS — R22.32 ARM MASS, LEFT: ICD-10-CM

## 2023-10-30 PROCEDURE — 73218 MRI UPPER EXTREMITY W/O DYE: CPT

## 2023-10-30 NOTE — PROGRESS NOTES
Pt arrived for his Left Humerus MRI. Gel caps placed both superior and inferior to mass. Incomplete scan d/t pt unable to roll onto left side to resolve anatomy cut off. Pt unable to continue at this point. Best images possible sent.

## 2023-11-03 ENCOUNTER — TELEPHONE (OUTPATIENT)
Dept: FAMILY MEDICINE CLINIC | Age: 73
End: 2023-11-03

## 2023-11-03 NOTE — TELEPHONE ENCOUNTER
Per the results from 10/30/23   Caro Senters need to go back for additional mri images. Pls call radiology to schedule this. If you have trouble let us know  They may need me to write another rx. You had some degenerative changes in the shoulder joint  Sincerely  DrP   Written by Analilia Douglas MD on 10/30/2023  6:09 PM EDT  Seen by patient Giovanni Dumont on 10/30/2023  9:41 PM      Is there another scan the patient should get done?

## 2023-11-03 NOTE — TELEPHONE ENCOUNTER
Pt went in for a MRI on 10/30/2023 for a mass on left arm they could not complete the test he could not twist or position himself so that they could get the testing done they tried 2 times so he stated he cannot do a MRI so what the pt wants to know is there another procedure that can be done ?         Please advise

## 2023-11-08 NOTE — TELEPHONE ENCOUNTER
Patient was calling in stating he called on 11/3/23 asking to talk to Dr. Frida De Luna about his MRI. He was told she was out of the office but would call him this week and he has not heard anything so he was calling back.     Please Advise

## 2023-11-10 DIAGNOSIS — R22.32 ARM MASS, LEFT: Primary | ICD-10-CM

## 2023-11-14 ENCOUNTER — HOSPITAL ENCOUNTER (OUTPATIENT)
Dept: ULTRASOUND IMAGING | Age: 73
Discharge: HOME | End: 2023-11-14
Attending: INTERNAL MEDICINE
Payer: MEDICARE

## 2023-11-14 DIAGNOSIS — R22.32 ARM MASS, LEFT: ICD-10-CM

## 2023-11-14 DIAGNOSIS — D17.20 LIPOMA OF UPPER EXTREMITY, UNSPECIFIED LATERALITY: Primary | ICD-10-CM

## 2023-11-14 PROCEDURE — 76882 US LMTD JT/FCL EVL NVASC XTR: CPT

## 2023-11-22 ENCOUNTER — OFFICE VISIT (OUTPATIENT)
Dept: SURGERY | Age: 73
End: 2023-11-22

## 2023-11-22 VITALS — BODY MASS INDEX: 43.03 KG/M2 | WEIGHT: 283 LBS

## 2023-11-22 DIAGNOSIS — D17.22 LIPOMA OF LEFT UPPER EXTREMITY: Primary | ICD-10-CM

## 2023-11-22 NOTE — PROGRESS NOTES
Housing Stability: Unknown (7/22/2023)    Housing Stability Vital Sign     Unable to Pay for Housing in the Last Year: Not on file     Number of Places Lived in the Last Year: Not on file     Unstable Housing in the Last Year: No       Family History   Problem Relation Age of Onset    Cancer Mother 59        LUNG    Other Father 72        AORTIC ANEURYSM    Cancer Sister         BREAST     Other Brother         HIV    Arthritis Other     Kidney Disease Other        Review of Systems   Constitutional: Negative. Skin: Negative. Hematological:  Bruises/bleeds easily. All other systems reviewed and are negative. Objective:   Physical Exam  Vitals reviewed. Constitutional:       General: He is not in acute distress. Appearance: Normal appearance. He is well-developed. He is not diaphoretic. HENT:      Head: Normocephalic and atraumatic. Right Ear: External ear normal.      Left Ear: External ear normal.   Eyes:      Conjunctiva/sclera: Conjunctivae normal.      Pupils: Pupils are equal, round, and reactive to light. Neck:      Trachea: Trachea normal.   Cardiovascular:      Rate and Rhythm: Normal rate and regular rhythm. Heart sounds: Normal heart sounds, S1 normal and S2 normal.   Pulmonary:      Effort: Pulmonary effort is normal. No respiratory distress. Breath sounds: Normal breath sounds. No wheezing. Abdominal:      General: There is no distension. Palpations: Abdomen is soft. Musculoskeletal:         General: Normal range of motion. Arms:       Cervical back: Normal range of motion and neck supple. Skin:     General: Skin is warm and dry. Findings: No erythema. Neurological:      Mental Status: He is alert and oriented to person, place, and time. Psychiatric:         Behavior: Behavior normal. Behavior is cooperative. Thought Content: Thought content normal.         Judgment: Judgment normal.         Assessment:       Diagnosis Orders   1.

## 2023-11-27 RX ORDER — LOSARTAN POTASSIUM 25 MG/1
25 TABLET ORAL DAILY
COMMUNITY

## 2023-11-27 NOTE — FLOWSHEET NOTE
[address]  time__830______        Surgery time_____10_______    Take the following medications with a sip of water: Follow your MD/Surgeons pre-procedure instructions regarding your medications     Do not eat or drink anything after 12:00 midnight prior to your surgery. This includes water chewing gum, mints and ice chips. You may brush your teeth and gargle the morning of your surgery, but do not swallow the water     Please see your family doctor/pediatrician for a history and physical and/or concerning medications. Bring any test results/reports from your physicians office. If you are under the care of a heart doctor or specialist doctor, please be aware that you may be asked to them for clearance    You may be asked to stop blood thinners such as Coumadin, Plavix, Fragmin, Lovenox, etc., or any anti-inflammatories such as:  Aspirin, Ibuprofen, Advil, Naproxen prior to your surgery. We also ask that you stop any OTC medications such as fish oil, vitamin E, glucosamine, garlic, Multivitamins, COQ 10, etc.    We ask that you do not smoke 24 hours prior to surgery  We ask that you do not  drink any alcoholic beverages 24 hours prior to surgery     You must make arrangements for a responsible adult to take you home after your surgery. For your safety you will not be allowed to leave alone or drive yourself home. Your surgery will be cancelled if you do not have a ride home. Also for your safety, it is strongly suggested that someone stay with you the first 24 hours after your surgery. A parent or legal guardian must accompany a child scheduled for surgery and plan to stay at the hospital until the child is discharged. Please do not bring other children with you. For your comfort, please wear simple loose fitting clothing to the hospital.  Please do not bring valuables.     Do not wear any make-up or nail polish on your fingers or toes

## 2023-11-27 NOTE — PROGRESS NOTES
[facility] Pre-Admission Testing Electronic Communication Worksheet for OR/ENDO Procedures        Patient: Huey Darby    DOS:  12/11    Arrival Time: 1    Surgery Time:1000    Meds to Bed:  [x] YES    []  NO    Transportation Confirmed: [x] YES    []  NO    History and Physical:  [x] YES    []  NO  [] N/A  If yes, please list doctor or Urgent Care and date of H&P:     Additional Clearance(Cardiac, Pulmonary, etc):  [] YES    [x]  NO    Pre-Admission Testing Visit:  [] YES    [x]  NO If no, do labs/testing need to be done DOS?   [] YES    [x]  NO    Medication Reconciliation Complete:  [] YES    [x]  NO        Additional Notes:                Interview Complete: [x] YES    []  NO

## 2023-12-08 ENCOUNTER — ANESTHESIA EVENT (OUTPATIENT)
Dept: OPERATING ROOM | Age: 73
End: 2023-12-08
Payer: MEDICARE

## 2023-12-11 ENCOUNTER — ANESTHESIA (OUTPATIENT)
Dept: OPERATING ROOM | Age: 73
End: 2023-12-11
Payer: MEDICARE

## 2023-12-11 ENCOUNTER — HOSPITAL ENCOUNTER (OUTPATIENT)
Age: 73
Setting detail: OUTPATIENT SURGERY
Discharge: HOME | End: 2023-12-11
Attending: SURGERY | Admitting: SURGERY
Payer: MEDICARE

## 2023-12-11 VITALS
RESPIRATION RATE: 16 BRPM | BODY MASS INDEX: 42.87 KG/M2 | HEIGHT: 68 IN | OXYGEN SATURATION: 99 % | WEIGHT: 282.85 LBS | HEART RATE: 87 BPM | DIASTOLIC BLOOD PRESSURE: 79 MMHG | SYSTOLIC BLOOD PRESSURE: 143 MMHG | TEMPERATURE: 97.4 F

## 2023-12-11 DIAGNOSIS — D17.22 LIPOMA OF LEFT UPPER EXTREMITY: ICD-10-CM

## 2023-12-11 LAB
GLUCOSE BLD-MCNC: 81 MG/DL (ref 70–99)
PERFORMED ON: NORMAL

## 2023-12-11 PROCEDURE — 7100000001 HC PACU RECOVERY - ADDTL 15 MIN: Performed by: SURGERY

## 2023-12-11 PROCEDURE — 88304 TISSUE EXAM BY PATHOLOGIST: CPT

## 2023-12-11 PROCEDURE — 7100000011 HC PHASE II RECOVERY - ADDTL 15 MIN: Performed by: SURGERY

## 2023-12-11 PROCEDURE — 6360000002 HC RX W HCPCS: Performed by: SURGERY

## 2023-12-11 PROCEDURE — 6360000002 HC RX W HCPCS: Performed by: NURSE ANESTHETIST, CERTIFIED REGISTERED

## 2023-12-11 PROCEDURE — 2580000003 HC RX 258: Performed by: SURGERY

## 2023-12-11 PROCEDURE — 7100000000 HC PACU RECOVERY - FIRST 15 MIN: Performed by: SURGERY

## 2023-12-11 PROCEDURE — 3600000012 HC SURGERY LEVEL 2 ADDTL 15MIN: Performed by: SURGERY

## 2023-12-11 PROCEDURE — 3600000002 HC SURGERY LEVEL 2 BASE: Performed by: SURGERY

## 2023-12-11 PROCEDURE — 2580000003 HC RX 258: Performed by: ANESTHESIOLOGY

## 2023-12-11 PROCEDURE — 2500000003 HC RX 250 WO HCPCS: Performed by: SURGERY

## 2023-12-11 PROCEDURE — 24071 EXC ARM/ELBOW LES SC 3 CM/>: CPT | Performed by: SURGERY

## 2023-12-11 PROCEDURE — 3700000000 HC ANESTHESIA ATTENDED CARE: Performed by: SURGERY

## 2023-12-11 PROCEDURE — A4217 STERILE WATER/SALINE, 500 ML: HCPCS | Performed by: SURGERY

## 2023-12-11 PROCEDURE — 2500000003 HC RX 250 WO HCPCS: Performed by: NURSE ANESTHETIST, CERTIFIED REGISTERED

## 2023-12-11 PROCEDURE — 3700000001 HC ADD 15 MINUTES (ANESTHESIA): Performed by: SURGERY

## 2023-12-11 PROCEDURE — 7100000010 HC PHASE II RECOVERY - FIRST 15 MIN: Performed by: SURGERY

## 2023-12-11 PROCEDURE — 2709999900 HC NON-CHARGEABLE SUPPLY: Performed by: SURGERY

## 2023-12-11 RX ORDER — OXYCODONE HYDROCHLORIDE 10 MG/1
10 TABLET ORAL PRN
Status: DISCONTINUED | OUTPATIENT
Start: 2023-12-11 | End: 2023-12-11 | Stop reason: HOSPADM

## 2023-12-11 RX ORDER — OXYCODONE HYDROCHLORIDE 5 MG/1
5 TABLET ORAL PRN
Status: DISCONTINUED | OUTPATIENT
Start: 2023-12-11 | End: 2023-12-11 | Stop reason: HOSPADM

## 2023-12-11 RX ORDER — SODIUM CHLORIDE 0.9 % (FLUSH) 0.9 %
5-40 SYRINGE (ML) INJECTION EVERY 12 HOURS SCHEDULED
Status: DISCONTINUED | OUTPATIENT
Start: 2023-12-11 | End: 2023-12-11 | Stop reason: HOSPADM

## 2023-12-11 RX ORDER — LIDOCAINE HYDROCHLORIDE 20 MG/ML
INJECTION, SOLUTION EPIDURAL; INFILTRATION; INTRACAUDAL; PERINEURAL PRN
Status: DISCONTINUED | OUTPATIENT
Start: 2023-12-11 | End: 2023-12-11 | Stop reason: SDUPTHER

## 2023-12-11 RX ORDER — SODIUM CHLORIDE 9 MG/ML
INJECTION, SOLUTION INTRAVENOUS PRN
Status: DISCONTINUED | OUTPATIENT
Start: 2023-12-11 | End: 2023-12-11 | Stop reason: HOSPADM

## 2023-12-11 RX ORDER — BUPIVACAINE HYDROCHLORIDE 5 MG/ML
INJECTION, SOLUTION EPIDURAL; INTRACAUDAL
Status: COMPLETED | OUTPATIENT
Start: 2023-12-11 | End: 2023-12-11

## 2023-12-11 RX ORDER — ONDANSETRON 2 MG/ML
4 INJECTION INTRAMUSCULAR; INTRAVENOUS
Status: DISCONTINUED | OUTPATIENT
Start: 2023-12-11 | End: 2023-12-11 | Stop reason: HOSPADM

## 2023-12-11 RX ORDER — SODIUM CHLORIDE 0.9 % (FLUSH) 0.9 %
5-40 SYRINGE (ML) INJECTION PRN
Status: DISCONTINUED | OUTPATIENT
Start: 2023-12-11 | End: 2023-12-11 | Stop reason: HOSPADM

## 2023-12-11 RX ORDER — MAGNESIUM HYDROXIDE 1200 MG/15ML
LIQUID ORAL CONTINUOUS PRN
Status: DISCONTINUED | OUTPATIENT
Start: 2023-12-11 | End: 2023-12-11 | Stop reason: HOSPADM

## 2023-12-11 RX ORDER — FENTANYL CITRATE 50 UG/ML
INJECTION, SOLUTION INTRAMUSCULAR; INTRAVENOUS PRN
Status: DISCONTINUED | OUTPATIENT
Start: 2023-12-11 | End: 2023-12-11 | Stop reason: SDUPTHER

## 2023-12-11 RX ORDER — PROPOFOL 10 MG/ML
INJECTION, EMULSION INTRAVENOUS CONTINUOUS PRN
Status: DISCONTINUED | OUTPATIENT
Start: 2023-12-11 | End: 2023-12-11 | Stop reason: SDUPTHER

## 2023-12-11 RX ADMIN — SODIUM CHLORIDE: 9 INJECTION, SOLUTION INTRAVENOUS at 09:14

## 2023-12-11 RX ADMIN — FENTANYL CITRATE 50 MCG: 50 INJECTION INTRAMUSCULAR; INTRAVENOUS at 10:17

## 2023-12-11 RX ADMIN — SODIUM CHLORIDE 3000 MG: 900 INJECTION INTRAVENOUS at 09:48

## 2023-12-11 RX ADMIN — FENTANYL CITRATE 50 MCG: 50 INJECTION INTRAMUSCULAR; INTRAVENOUS at 09:56

## 2023-12-11 RX ADMIN — LIDOCAINE HYDROCHLORIDE 60 MG: 20 INJECTION, SOLUTION EPIDURAL; INFILTRATION; INTRACAUDAL; PERINEURAL at 09:56

## 2023-12-11 RX ADMIN — PROPOFOL 200 MCG/KG/MIN: 10 INJECTION, EMULSION INTRAVENOUS at 09:56

## 2023-12-11 ASSESSMENT — PAIN SCALES - WONG BAKER
WONGBAKER_NUMERICALRESPONSE: 0

## 2023-12-11 ASSESSMENT — ENCOUNTER SYMPTOMS: SHORTNESS OF BREATH: 0

## 2023-12-11 ASSESSMENT — PAIN - FUNCTIONAL ASSESSMENT: PAIN_FUNCTIONAL_ASSESSMENT: NONE - DENIES PAIN

## 2023-12-11 NOTE — PROGRESS NOTES
Pt received into [facility] from OR. Pt drowsy, yet arousable. Vss. Pt stable. Report obtained. Left arm dressing with surgical glue, c/d/I. Ice pack applied at site. Pt appears comfortable. Will monitor.

## 2023-12-11 NOTE — BRIEF OP NOTE
Brief Postoperative Note      Patient: Valeri Armando  YOB: 1950  MRN: [id number]    Date of Procedure: 12/11/2023    Pre-Op Diagnosis Codes:     * Lipoma of left upper extremity [D17.22]    Post-Op Diagnosis: Same       Procedure(s):  EXCISION OF LEFT UPPER ARM LIPOMA    Surgeon(s):  Yuliet Ramirez MD    Assistant:  Surgical Assistant: Miah Solano    Anesthesia: Monitor Anesthesia Care    Estimated Blood Loss (mL): Minimal    Complications: None    Specimens:   ID Type Source Tests Collected by Time Destination   A : A.  LEFT Arm Lipoma Tissue Tissue SURGICAL PATHOLOGY Yuliet Ramirez MD 12/11/2023 1947        Implants:  * No implants in log *      Drains: * No LDAs found *    Findings: 4.5 cm   This procedure was not performed to treat primary cutaneous melanoma through wide local excision      Electronically signed by Yuliet Ramirez MD on 12/11/2023 at 10:10 AM

## 2023-12-11 NOTE — ANESTHESIA PRE PROCEDURE
Results   Component Value Date/Time    PROTIME 11.8 04/08/2019 06:45 AM    INR 1.04 04/08/2019 06:45 AM    APTT 27.2 04/08/2019 06:45 AM     HCG (If Applicable) No results found for: \"PREGTESTUR\", \"PREGSERUM\", \"HCG\", \"HCGQUANT\"   ABGs No results found for: \"PHART\", \"PO2ART\", \"LFX5VYT\", \"APD3PCT\", \"BEART\", \"M4CIZMTW\"   Type & Screen (If Applicable)  No results found for: \"LABABO\", \"LABRH\"                         BMI: Wt Readings from Last 3 Encounters:       NPO Status:   Date of last liquid consumption: 12/10/23   Time of last liquid consumption: 2100   Date of last solid food consumption: 12/10/23      Time of last solid consumption: 2100       Anesthesia Evaluation  Patient summary reviewed and Nursing notes reviewed  Airway: Mallampati: III  TM distance: >3 FB   Neck ROM: full  Mouth opening: > = 3 FB   Dental:          Pulmonary:   (+)     sleep apnea: on CPAP,           (-) COPD, asthma and shortness of breath                           Cardiovascular:    (+) hypertension:    (-) valvular problems/murmurs, past MI, CAD, CABG/stent, dysrhythmias,  angina and no hyperlipidemia                Neuro/Psych:   (+) neuromuscular disease:   (-) seizures, TIA and CVA           GI/Hepatic/Renal:   (+) morbid obesity     (-) GERD, PUD, liver disease and no renal disease       Endo/Other:    (+) hypothyroidism::..    (-) diabetes mellitus               Abdominal:             Vascular: negative vascular ROS. Other Findings:           Anesthesia Plan      MAC     ASA 3     (I discussed intravenous sedation to the patient's satisfaction including risks and alternatives. The patient agreed with the plan and has no further questions. Tyree Lynn MD )  Induction: intravenous. Anesthetic plan and risks discussed with patient. Plan discussed with CRNA.                   This pre-anesthesia assessment may be used as a history and physical.    DOS STAFF ADDENDUM:    Pt seen and examined, chart reviewed

## 2023-12-11 NOTE — H&P
Update History & Physical    The patient's History and Physical of November 22, 2023 was reviewed with the patient and I examined the patient. There was no change. Plan excision left arm lipoma. Left upper arm marked. The surgical site was confirmed by the patient and me. Plan: The risks, benefits, expected outcome, and alternative to the recommended procedure have been discussed with the patient. Patient understands and wants to proceed with the procedure.      Electronically signed by Mata Swann MD on 12/11/2023 at 9:36 AM

## 2023-12-12 NOTE — OP NOTE
[address], [facility] [address]                                OPERATIVE REPORT    PATIENT NAME: Joe Roca                     :        1950  MED REC NO:   [id number]                          ROOM:  ACCOUNT NO:   [de-identified]                           ADMIT DATE: 2023  PROVIDER:     Fanny Gonzalez MD    DATE OF PROCEDURE:  2023    PREOPERATIVE DIAGNOSIS:  Left upper arm lipoma. POSTOPERATIVE DIAGNOSIS:  Left upper arm lipoma. OPERATION PERFORMED:  Excision of left upper arm lipoma. SURGEON:  Fanny Gonzalez MD    ANESTHESIA:  MAC with local anesthetic. ASA CLASS:  III. ANTIBIOTICS:  Ancef 3 gm IV. DVT PROPHYLAXIS:  Bilateral pneumatic compression devices. ESTIMATED BLOOD LOSS:  Minimal.    SPECIMEN:  Left upper arm lipoma. INDICATIONS:  The patient is a 54-year-old gentleman with a slowly  enlarging mass of the left lateral biceps area causing increasing pain. Clinically, it was felt to be lipoma. As a result, I recommended  excision of this in the operating room. Risks of bleeding, infection,  anesthesia, risk of injuring surrounding structures as well as  recurrences were discussed in length and he was agreeable to proceed. OPERATIVE PROCEDURE:  The patient was brought to the operating suite and  placed in the supine position in the operating room table. Anesthesia  was induced and he tolerated it well. Left arm was secured with  restraint across his chest and then the area prepped and draped in the  usual sterile fashion. A time-out was performed. Injecting local  anesthetic, a 4-cm incision was made on the top of this 4.5 cm palpable  mass in the left lateral upper arm. Dissected this down to the  subcutaneous tissue where a well-circumscribed lipoma was encountered.    It was circumferentially dissected with a combination of blunt  dissection and cautery sent off for a

## 2023-12-28 ENCOUNTER — TELEPHONE (OUTPATIENT)
Dept: FAMILY MEDICINE CLINIC | Age: 73
End: 2023-12-28

## 2023-12-28 RX ORDER — LOSARTAN POTASSIUM AND HYDROCHLOROTHIAZIDE 12.5; 5 MG/1; MG/1
1 TABLET ORAL DAILY
Qty: 90 TABLET | Refills: 1 | OUTPATIENT
Start: 2023-12-28

## 2024-01-02 RX ORDER — LOSARTAN POTASSIUM AND HYDROCHLOROTHIAZIDE 12.5; 5 MG/1; MG/1
1 TABLET ORAL DAILY
Qty: 90 TABLET | Refills: 1 | Status: SHIPPED | OUTPATIENT
Start: 2024-01-02

## 2024-01-02 NOTE — TELEPHONE ENCOUNTER
I refilled the hyzaar  50/12.5   That dose had fallen off the list  Confirm that is what he wanted.

## 2024-01-02 NOTE — TELEPHONE ENCOUNTER
Pt calling in, he is completely out of losartan and would like to know if it can be sent in.    Please reach out to pt at [phone]

## 2024-01-02 NOTE — TELEPHONE ENCOUNTER
Spoke to the patient, that is the correct medication he needed refilled. The patient will  the med at the pharmacy

## 2024-01-09 ENCOUNTER — TELEMEDICINE (OUTPATIENT)
Dept: FAMILY MEDICINE CLINIC | Age: 74
End: 2024-01-09
Payer: MEDICARE

## 2024-01-09 ENCOUNTER — TELEPHONE (OUTPATIENT)
Dept: FAMILY MEDICINE CLINIC | Age: 74
End: 2024-01-09

## 2024-01-09 DIAGNOSIS — E66.01 OBESITY, CLASS III, BMI 40-49.9 (MORBID OBESITY) (HCC): ICD-10-CM

## 2024-01-09 DIAGNOSIS — U07.1 COVID-19: Primary | ICD-10-CM

## 2024-01-09 PROCEDURE — 1124F ACP DISCUSS-NO DSCNMKR DOCD: CPT | Performed by: INTERNAL MEDICINE

## 2024-01-09 PROCEDURE — 99213 OFFICE O/P EST LOW 20 MIN: CPT | Performed by: INTERNAL MEDICINE

## 2024-01-09 ASSESSMENT — ENCOUNTER SYMPTOMS: COUGH: 1

## 2024-01-09 NOTE — PROGRESS NOTES
Heladio Pacheco (:  1950) is here for evaluation of the following:    Assessment/Plan  Heladio was seen today for positive for covid-19, cough and congestion.    Diagnoses and all orders for this visit:    COVID-19    Obesity, Class III, BMI 40-49.9 (morbid obesity) ([facility])       Supportive care.  Pt looked fine on the video  He declined paxlovid for now  Will call back if he changes his mind        Patient Instructions   You may take  over the counter medications such as mucinex plain for congestion or mucinex DM if you also have a cough.  Tylenol for pain and fever and or ibuprofen if you have no contraindication for taking it such as GI ulcers or heart disease.    Use cepacol sore throat drops as needed .  If sweetened with artifical sweetener this may cause diarrhea.       Subjective   Positive For Covid-19  Associated symptoms include coughing.   Cough      3 days terrible cough and stuffed up head .  Did home test + covid  No fever or chills  No nausea or vomiting  Not shortness of breath    Had covid  once   No body aches  Taking nyquil /dayquil  Only has one functioning kidney    Wears an I watch .      Review of Systems   Respiratory:  Positive for cough.           Objective   Patient-Reported Vitals  No data recorded     Physical Exam  Constitutional:       Appearance: Normal appearance.   HENT:      Head: Normocephalic.   Pulmonary:      Effort: Pulmonary effort is normal.   Neurological:      Mental Status: He is alert.   Psychiatric:         Mood and Affect: Mood normal.         Behavior: Behavior normal.         Thought Content: Thought content normal.         Judgment: Judgment normal.                Heladio Pacheco, was evaluated through a synchronous (real-time) audio-video encounter. The patient (or guardian if applicable) is aware that this is a billable service, which includes applicable co-pays. This Virtual Visit was conducted with patient's (and/or legal guardian's) consent. Patient

## 2024-01-09 NOTE — TELEPHONE ENCOUNTER
----- Message from Karlie Stout sent at 1/9/2024 11:35 AM EST -----  Subject: Message to Provider    QUESTIONS  Information for Provider? Patient had to cancel appt for tomorrow he   tested positive for covid he wants to know if PCP can send him over   Paxlovid to the pharmacy. Symptoms began 2days ago   ---------------------------------------------------------------------------  --------------  CALL BACK INFO  [phone]; OK to leave message on voicemail  ---------------------------------------------------------------------------  --------------  SCRIPT ANSWERS  Relationship to Patient? Self

## 2024-01-09 NOTE — PATIENT INSTRUCTIONS
You may take  over the counter medications such as mucinex plain for congestion or mucinex DM if you also have a cough.  Tylenol for pain and fever and or ibuprofen if you have no contraindication for taking it such as GI ulcers or heart disease.    Use cepacol sore throat drops as needed .  If sweetened with artifical sweetener this may cause diarrhea.

## 2024-01-11 ENCOUNTER — TELEPHONE (OUTPATIENT)
Dept: FAMILY MEDICINE CLINIC | Age: 74
End: 2024-01-11

## 2024-01-12 ASSESSMENT — PATIENT HEALTH QUESTIONNAIRE - PHQ9
SUM OF ALL RESPONSES TO PHQ9 QUESTIONS 1 & 2: 0
SUM OF ALL RESPONSES TO PHQ QUESTIONS 1-9: 0
2. FEELING DOWN, DEPRESSED OR HOPELESS: 0
SUM OF ALL RESPONSES TO PHQ QUESTIONS 1-9: 0
1. LITTLE INTEREST OR PLEASURE IN DOING THINGS: 0
SUM OF ALL RESPONSES TO PHQ QUESTIONS 1-9: 0
SUM OF ALL RESPONSES TO PHQ QUESTIONS 1-9: 0
1. LITTLE INTEREST OR PLEASURE IN DOING THINGS: NOT AT ALL
SUM OF ALL RESPONSES TO PHQ9 QUESTIONS 1 & 2: 0
2. FEELING DOWN, DEPRESSED OR HOPELESS: NOT AT ALL

## 2024-01-17 ENCOUNTER — OFFICE VISIT (OUTPATIENT)
Dept: FAMILY MEDICINE CLINIC | Age: 74
End: 2024-01-17
Payer: MEDICARE

## 2024-01-17 VITALS
DIASTOLIC BLOOD PRESSURE: 66 MMHG | BODY MASS INDEX: 42.74 KG/M2 | WEIGHT: 282 LBS | RESPIRATION RATE: 16 BRPM | HEIGHT: 68 IN | SYSTOLIC BLOOD PRESSURE: 128 MMHG | OXYGEN SATURATION: 96 % | HEART RATE: 83 BPM

## 2024-01-17 DIAGNOSIS — Z79.01 CURRENT USE OF LONG TERM ANTICOAGULATION: ICD-10-CM

## 2024-01-17 DIAGNOSIS — R73.03 PREDIABETES: Primary | ICD-10-CM

## 2024-01-17 DIAGNOSIS — Z86.79 HISTORY OF ATRIAL FIBRILLATION: ICD-10-CM

## 2024-01-17 DIAGNOSIS — Z86.718 HISTORY OF DVT (DEEP VEIN THROMBOSIS): ICD-10-CM

## 2024-01-17 DIAGNOSIS — I10 ESSENTIAL HYPERTENSION: ICD-10-CM

## 2024-01-17 PROBLEM — I48.91 ATRIAL FIBRILLATION, UNSPECIFIED TYPE (HCC): Status: RESOLVED | Noted: 2023-01-24 | Resolved: 2024-01-17

## 2024-01-17 PROBLEM — I82.4Z3 ACUTE DEEP VEIN THROMBOSIS (DVT) OF DISTAL VEIN OF BOTH LOWER EXTREMITIES (HCC): Status: RESOLVED | Noted: 2022-10-05 | Resolved: 2024-01-17

## 2024-01-17 LAB — HBA1C MFR BLD: 5.9 %

## 2024-01-17 PROCEDURE — 3074F SYST BP LT 130 MM HG: CPT | Performed by: INTERNAL MEDICINE

## 2024-01-17 PROCEDURE — 83036 HEMOGLOBIN GLYCOSYLATED A1C: CPT | Performed by: INTERNAL MEDICINE

## 2024-01-17 PROCEDURE — 3078F DIAST BP <80 MM HG: CPT | Performed by: INTERNAL MEDICINE

## 2024-01-17 PROCEDURE — 99214 OFFICE O/P EST MOD 30 MIN: CPT | Performed by: INTERNAL MEDICINE

## 2024-01-17 PROCEDURE — 1124F ACP DISCUSS-NO DSCNMKR DOCD: CPT | Performed by: INTERNAL MEDICINE

## 2024-01-17 NOTE — PATIENT INSTRUCTIONS
Book  lady with the red apron     There is a great carbohydrate book at this website  [email or web address]-diabetes/tools-and-resources.[email or web address]?id=resources-accordion-group-Educational-Booklets

## 2024-01-17 NOTE — PROGRESS NOTES
Heladio Pacheco (:  1950) is a 73 y.o. male, here for evaluation of the following chief complaint(s):  prediabetes and Medication Check (Patient is here for a preDM med follow up. Patient had covid last week )           Assessment/Plan  Heladio was seen today for prediabetes and medication check.    Diagnoses and all orders for this visit:    Prediabetes  -     POCT glycosylated hemoglobin (Hb A1C)    Current use of long term anticoagulation -DVT x 2, told by hematology could stop xarelto (dr. Richards) per pt, cardiology wanted him on it for life  Comments:  Dr Briseida Clark per pt   ( [facility])  he saw pt in the hospital after dvt after back surgery    History of atrial fibrillation    History of DVT (deep vein thrombosis)    Essential hypertension on hyzaar        Discussed diet , exercise  Follow up 4 month     Subjective   SUBJECTIVE/OBJECTIVE:  HPI  About day 11 of covid  Fine now  Retested and was neg yesterday    Here for prediabetes  Wt  was about the same  Ate more bad food over the holidays    Does not drink sugared drinks    Bfast fig fontenot bar  ,donut , no eggs  Melon    Lunch a sandwich , chips      Since covid not eating right  Eats pb and J   regular sugared jelly    Decided he needs to walk  Started with covid    His heart doctor is giving him ozempic  2 mg    On 2mg for  3 months  Has not lost wt with it  Lost 10 lbs initially  Htn  On hyzaar  and metoprolol    Hemoglobin A1C (%)   Date Value   2024 5.9   2023 5.3   12/10/2021 5.6       Sodium (mmol/L)   Date Value   10/10/2023 141   2023 138   2023 141     Potassium (mmol/L)   Date Value   10/10/2023 4.2   2023 4.2   2023 4.3     Chloride (mmol/L)   Date Value   10/10/2023 102   2023 102   2023 102     CO2 (mmol/L)   Date Value   10/10/2023 27   2023 25   2023 26     BUN (mg/dL)   Date Value   10/10/2023 14   2023 12   2023 24 (H)     Creatinine (mg/dL)   Date

## 2024-05-21 ENCOUNTER — OFFICE VISIT (OUTPATIENT)
Dept: FAMILY MEDICINE CLINIC | Age: 74
End: 2024-05-21
Payer: MEDICARE

## 2024-05-21 VITALS
RESPIRATION RATE: 16 BRPM | HEART RATE: 79 BPM | SYSTOLIC BLOOD PRESSURE: 134 MMHG | OXYGEN SATURATION: 95 % | DIASTOLIC BLOOD PRESSURE: 78 MMHG | BODY MASS INDEX: 42.57 KG/M2 | WEIGHT: 280 LBS

## 2024-05-21 DIAGNOSIS — I10 ESSENTIAL HYPERTENSION: ICD-10-CM

## 2024-05-21 DIAGNOSIS — Z79.01 CURRENT USE OF LONG TERM ANTICOAGULATION: ICD-10-CM

## 2024-05-21 DIAGNOSIS — R73.03 PREDIABETES: Primary | ICD-10-CM

## 2024-05-21 DIAGNOSIS — E03.8 OTHER SPECIFIED HYPOTHYROIDISM: ICD-10-CM

## 2024-05-21 PROBLEM — S06.5XAA SUBDURAL HEMATOMA (HCC): Status: RESOLVED | Noted: 2018-01-12 | Resolved: 2024-05-21

## 2024-05-21 LAB — HBA1C MFR BLD: 6.1 %

## 2024-05-21 PROCEDURE — 1124F ACP DISCUSS-NO DSCNMKR DOCD: CPT | Performed by: INTERNAL MEDICINE

## 2024-05-21 PROCEDURE — 3075F SYST BP GE 130 - 139MM HG: CPT | Performed by: INTERNAL MEDICINE

## 2024-05-21 PROCEDURE — 99214 OFFICE O/P EST MOD 30 MIN: CPT | Performed by: INTERNAL MEDICINE

## 2024-05-21 PROCEDURE — 83036 HEMOGLOBIN GLYCOSYLATED A1C: CPT | Performed by: INTERNAL MEDICINE

## 2024-05-21 PROCEDURE — 3078F DIAST BP <80 MM HG: CPT | Performed by: INTERNAL MEDICINE

## 2024-05-21 RX ORDER — BLOOD-GLUCOSE SENSOR
EACH MISCELLANEOUS
Qty: 2 EACH | Refills: 0 | Status: SHIPPED | COMMUNITY
Start: 2024-05-21

## 2024-05-21 NOTE — PROGRESS NOTES
Heladio Pacheco (:  1950) is a 74 y.o. male, here for evaluation of the following chief complaint(s):  prediabetes (Patient is here for a 3-4 month follow up for pre dm )      Assessment & Plan     Assessment/Plan  Heladio was seen today for prediabetes.    Diagnoses and all orders for this visit:    Prediabetes  -     POCT glycosylated hemoglobin (Hb A1C)  -     Continuous Glucose Sensor (FREESTYLE SIMONE 3 SENSOR) MISC; Change every 14 days    Essential hypertension on hyzaar hctz    Other specified hypothyroidism    Current use of long term anticoagulation -DVT x 2, told by hematology could stop xarelto (dr. Richards) per pt, cardiology wanted him on it for life       Keep an eye on bp  Follow up 3months  No change in bp meds     Subjective   SUBJECTIVE/OBJECTIVE:  HPI  Prediabetes  Not watching diet  No sugared drinks  Eats ice cream     Walks  for a  mile/day in the house  Not on metformin      Htn  Just a little   134/78  At home 120/70's    On synthroid   150  Energy level good    No lasix in months    On xarelto    On flomax  Saw   Had cysto  Had floating kidney stone  No nocturia   Had blood in the urine that caused him to see him  Does not have blood in the urine          Hemoglobin A1C (%)   Date Value   2024 6.1   2024 5.9   2023 5.3       Sodium (mmol/L)   Date Value   10/10/2023 141   2023 138   2023 141     Potassium (mmol/L)   Date Value   10/10/2023 4.2   2023 4.2   2023 4.3     Chloride (mmol/L)   Date Value   10/10/2023 102   2023 102   2023 102     CO2 (mmol/L)   Date Value   10/10/2023 27   2023 25   2023 26     BUN (mg/dL)   Date Value   2024 17   10/10/2023 14   2023 12     Creatinine (mg/dL)   Date Value   2024 1.0   10/10/2023 1.1   2023 1.1     Glucose (mg/dL)   Date Value   10/10/2023 118 (H)   2023 126 (H)   2023 109 (H)     Calcium (mg/dL)   Date Value   10/10/2023 9.0

## 2024-07-02 RX ORDER — LOSARTAN POTASSIUM AND HYDROCHLOROTHIAZIDE 12.5; 5 MG/1; MG/1
1 TABLET ORAL DAILY
Qty: 90 TABLET | Refills: 1 | Status: SHIPPED | OUTPATIENT
Start: 2024-07-02

## 2024-08-20 SDOH — ECONOMIC STABILITY: FOOD INSECURITY: WITHIN THE PAST 12 MONTHS, THE FOOD YOU BOUGHT JUST DIDN'T LAST AND YOU DIDN'T HAVE MONEY TO GET MORE.: NEVER TRUE

## 2024-08-20 SDOH — ECONOMIC STABILITY: FOOD INSECURITY: WITHIN THE PAST 12 MONTHS, YOU WORRIED THAT YOUR FOOD WOULD RUN OUT BEFORE YOU GOT MONEY TO BUY MORE.: NEVER TRUE

## 2024-08-20 SDOH — ECONOMIC STABILITY: INCOME INSECURITY: HOW HARD IS IT FOR YOU TO PAY FOR THE VERY BASICS LIKE FOOD, HOUSING, MEDICAL CARE, AND HEATING?: NOT HARD AT ALL

## 2024-08-21 DIAGNOSIS — E03.9 HYPOTHYROIDISM, UNSPECIFIED TYPE: ICD-10-CM

## 2024-08-21 RX ORDER — LEVOTHYROXINE SODIUM 0.15 MG/1
150 TABLET ORAL DAILY
Qty: 90 TABLET | Refills: 0 | Status: SHIPPED | OUTPATIENT
Start: 2024-08-21

## 2024-08-23 ENCOUNTER — OFFICE VISIT (OUTPATIENT)
Dept: FAMILY MEDICINE CLINIC | Age: 74
End: 2024-08-23
Payer: MEDICARE

## 2024-08-23 VITALS
SYSTOLIC BLOOD PRESSURE: 128 MMHG | OXYGEN SATURATION: 96 % | BODY MASS INDEX: 43.94 KG/M2 | DIASTOLIC BLOOD PRESSURE: 64 MMHG | RESPIRATION RATE: 16 BRPM | HEART RATE: 84 BPM | WEIGHT: 289 LBS

## 2024-08-23 DIAGNOSIS — K30 INDIGESTION: ICD-10-CM

## 2024-08-23 DIAGNOSIS — N40.0 ENLARGED PROSTATE: ICD-10-CM

## 2024-08-23 DIAGNOSIS — D64.9 ANEMIA, UNSPECIFIED TYPE: ICD-10-CM

## 2024-08-23 DIAGNOSIS — Z79.01 CHRONIC ANTICOAGULATION: ICD-10-CM

## 2024-08-23 DIAGNOSIS — N40.0 ENLARGED PROSTATE: Primary | ICD-10-CM

## 2024-08-23 DIAGNOSIS — I10 ESSENTIAL HYPERTENSION: ICD-10-CM

## 2024-08-23 DIAGNOSIS — Z12.5 ENCOUNTER FOR SCREENING FOR MALIGNANT NEOPLASM OF PROSTATE: ICD-10-CM

## 2024-08-23 LAB
FERRITIN SERPL IA-MCNC: 145 NG/ML (ref 30–400)
IGA SERPL-MCNC: 235 MG/DL (ref 70–400)
IRON SATN MFR SERPL: 53 % (ref 20–50)
IRON SERPL-MCNC: 169 UG/DL (ref 59–158)
PSA SERPL DL<=0.01 NG/ML-MCNC: 4.29 NG/ML (ref 0–4)
TIBC SERPL-MCNC: 316 UG/DL (ref 260–445)

## 2024-08-23 PROCEDURE — 99214 OFFICE O/P EST MOD 30 MIN: CPT | Performed by: INTERNAL MEDICINE

## 2024-08-23 PROCEDURE — 3078F DIAST BP <80 MM HG: CPT | Performed by: INTERNAL MEDICINE

## 2024-08-23 PROCEDURE — G2211 COMPLEX E/M VISIT ADD ON: HCPCS | Performed by: INTERNAL MEDICINE

## 2024-08-23 PROCEDURE — 3074F SYST BP LT 130 MM HG: CPT | Performed by: INTERNAL MEDICINE

## 2024-08-23 PROCEDURE — 1124F ACP DISCUSS-NO DSCNMKR DOCD: CPT | Performed by: INTERNAL MEDICINE

## 2024-08-23 RX ORDER — OMEPRAZOLE 20 MG/1
20 CAPSULE, DELAYED RELEASE ORAL
Qty: 30 CAPSULE | Refills: 0 | Status: SHIPPED | OUTPATIENT
Start: 2024-08-23

## 2024-08-23 NOTE — PROGRESS NOTES
Heladio Pacheco (:  1950) is a 74 y.o. male, here for evaluation of the following chief complaint(s):  3 Month Follow-Up and Medication Check (Patient is here for a 3 month med follow up )      Assessment & Plan     Assessment/Plan  Heladio was seen today for 3 month follow-up and medication check.    Diagnoses and all orders for this visit:    Enlarged prostate  -     PSA Screening; Future    Encounter for screening for malignant neoplasm of prostate  -     PSA Screening; Future    Essential hypertension on hyzaar hctz    Anemia, unspecified type  -     POCT Fecal Immunochemical Test (FIT); Future  -     Ferritin; Future  -     Iron and TIBC; Future  -     Celiac Screen with Reflex ([facility] Keli); Future    Chronic anticoagulation  -     POCT Fecal Immunochemical Test (FIT); Future  -     Ferritin; Future  -     Iron and TIBC; Future    Indigestion  -     omeprazole (PRILOSEC) 20 MG delayed release capsule; Take 1 capsule by mouth every morning (before breakfast)         Will ck psa  Saw urology and did not see a psa  Last psa  21  Continue bp med   No change  Just had colonoscopy  Hgb  down  by 2  No blood in stool seen   Has indigestion , no heartburn  Do fit  Ck iron studies and then send to dr Delacruz  Follow up 4y    Return in about 3 months (around 2024) for SCHEDULE MEDICARE AWE.         Subjective   SUBJECTIVE/OBJECTIVE:  HPI  Back is good  Able to get around  Steps are still a problem  Eats some carbs    On hyzaar  for bp  Cking bp at home   Usually 130's /70    On levothyroxine  150    Sees urology  Had blood in the urine  Stopped xarelto for 3 days , stopped  No further testing      Stopped ozempic   Too expensive and gave him diarrhea    2 daughters , niece with celiac    Hgb dropped  by 2    Hemoglobin A1C (%)   Date Value   2024 6.1 (H)   2024 6.1   2024 5.9       Sodium   Date Value   2024 139 mEq/L   10/10/2023 141 mmol/L   2023 138 mmol/L     Potassium

## 2024-08-24 LAB — TISSUE TRANSGLUTAMINASE IGA: <0.5 U/ML (ref 0–14)

## 2024-08-26 DIAGNOSIS — R97.20 ELEVATED PSA: Primary | ICD-10-CM

## 2024-08-27 DIAGNOSIS — D64.9 ANEMIA, UNSPECIFIED TYPE: ICD-10-CM

## 2024-08-27 DIAGNOSIS — Z79.01 CHRONIC ANTICOAGULATION: ICD-10-CM

## 2024-08-27 LAB
CONTROL: POSITIVE
FECAL BLOOD IMMUNOCHEMICAL TEST: POSITIVE

## 2024-08-27 PROCEDURE — 82274 ASSAY TEST FOR BLOOD FECAL: CPT | Performed by: INTERNAL MEDICINE

## 2024-09-04 DIAGNOSIS — D64.9 ANEMIA, UNSPECIFIED TYPE: Primary | ICD-10-CM

## 2024-09-09 ENCOUNTER — TELEPHONE (OUTPATIENT)
Dept: FAMILY MEDICINE CLINIC | Age: 74
End: 2024-09-09

## 2024-09-09 DIAGNOSIS — Z79.01 ON CONTINUOUS ORAL ANTICOAGULATION: ICD-10-CM

## 2024-09-09 DIAGNOSIS — R19.5 POSITIVE FIT (FECAL IMMUNOCHEMICAL TEST): Primary | ICD-10-CM

## 2024-09-09 DIAGNOSIS — D64.9 ANEMIA, UNSPECIFIED TYPE: ICD-10-CM

## 2024-09-21 DIAGNOSIS — K30 INDIGESTION: ICD-10-CM

## 2024-10-08 ENCOUNTER — TELEPHONE (OUTPATIENT)
Dept: FAMILY MEDICINE CLINIC | Age: 74
End: 2024-10-08

## 2024-10-08 NOTE — TELEPHONE ENCOUNTER
Pls call Heladio,  Tell him that GI reached out to me .  At this time they don't want to do EGD  ( upper scope)  Since the anemia is not iron deficient.  I will re ck his labs with the next appt

## 2024-10-31 DIAGNOSIS — K30 INDIGESTION: ICD-10-CM

## 2024-11-20 DIAGNOSIS — E03.9 HYPOTHYROIDISM, UNSPECIFIED TYPE: ICD-10-CM

## 2024-11-22 RX ORDER — LEVOTHYROXINE SODIUM 150 UG/1
150 TABLET ORAL DAILY
Qty: 90 TABLET | Refills: 0 | OUTPATIENT
Start: 2024-11-22

## 2024-11-22 SDOH — HEALTH STABILITY: PHYSICAL HEALTH: ON AVERAGE, HOW MANY DAYS PER WEEK DO YOU ENGAGE IN MODERATE TO STRENUOUS EXERCISE (LIKE A BRISK WALK)?: 0 DAYS

## 2024-11-22 SDOH — HEALTH STABILITY: PHYSICAL HEALTH: ON AVERAGE, HOW MANY MINUTES DO YOU ENGAGE IN EXERCISE AT THIS LEVEL?: 0 MIN

## 2024-11-22 ASSESSMENT — PATIENT HEALTH QUESTIONNAIRE - PHQ9
SUM OF ALL RESPONSES TO PHQ QUESTIONS 1-9: 0
SUM OF ALL RESPONSES TO PHQ QUESTIONS 1-9: 0
SUM OF ALL RESPONSES TO PHQ9 QUESTIONS 1 & 2: 0
1. LITTLE INTEREST OR PLEASURE IN DOING THINGS: NOT AT ALL
2. FEELING DOWN, DEPRESSED OR HOPELESS: NOT AT ALL
SUM OF ALL RESPONSES TO PHQ QUESTIONS 1-9: 0
SUM OF ALL RESPONSES TO PHQ QUESTIONS 1-9: 0

## 2024-11-22 ASSESSMENT — LIFESTYLE VARIABLES
HOW MANY STANDARD DRINKS CONTAINING ALCOHOL DO YOU HAVE ON A TYPICAL DAY: 1
HOW OFTEN DO YOU HAVE SIX OR MORE DRINKS ON ONE OCCASION: 1
HOW OFTEN DO YOU HAVE A DRINK CONTAINING ALCOHOL: 2-3 TIMES A WEEK
HOW OFTEN DO YOU HAVE A DRINK CONTAINING ALCOHOL: 4
HOW MANY STANDARD DRINKS CONTAINING ALCOHOL DO YOU HAVE ON A TYPICAL DAY: 1 OR 2

## 2024-11-25 ENCOUNTER — OFFICE VISIT (OUTPATIENT)
Dept: FAMILY MEDICINE CLINIC | Age: 74
End: 2024-11-25

## 2024-11-25 VITALS
OXYGEN SATURATION: 94 % | WEIGHT: 299 LBS | HEART RATE: 82 BPM | HEIGHT: 68 IN | DIASTOLIC BLOOD PRESSURE: 70 MMHG | RESPIRATION RATE: 16 BRPM | SYSTOLIC BLOOD PRESSURE: 134 MMHG | BODY MASS INDEX: 45.31 KG/M2

## 2024-11-25 DIAGNOSIS — Z86.718 HISTORY OF DVT (DEEP VEIN THROMBOSIS): ICD-10-CM

## 2024-11-25 DIAGNOSIS — Z00.00 MEDICARE ANNUAL WELLNESS VISIT, SUBSEQUENT: ICD-10-CM

## 2024-11-25 DIAGNOSIS — E03.8 OTHER SPECIFIED HYPOTHYROIDISM: ICD-10-CM

## 2024-11-25 DIAGNOSIS — Z23 NEED FOR INFLUENZA VACCINATION: ICD-10-CM

## 2024-11-25 DIAGNOSIS — R73.03 PREDIABETES: ICD-10-CM

## 2024-11-25 DIAGNOSIS — Z86.79 HISTORY OF ATRIAL FIBRILLATION: ICD-10-CM

## 2024-11-25 DIAGNOSIS — I10 ESSENTIAL HYPERTENSION: ICD-10-CM

## 2024-11-25 DIAGNOSIS — Z00.00 ANNUAL WELLNESS VISIT: Primary | ICD-10-CM

## 2024-11-25 LAB — HBA1C MFR BLD: 6.1 %

## 2024-11-25 NOTE — PATIENT INSTRUCTIONS
Learning About Being Active as an Older Adult  Why is being active important as you get older?     Being active is one of the best things you can do for your health. And it's never too late to start. Being active--or getting active, if you aren't already--has definite benefits. It can:  Give you more energy,  Keep your mind sharp.  Improve balance to reduce your risk of falls.  Help you manage chronic illness with fewer medicines.  No matter how old you are, how fit you are, or what health problems you have, there is a form of activity that will work for you. And the more physical activity you can do, the better your overall health will be.  What kinds of activity can help you stay healthy?  Being more active will make your daily activities easier. Physical activity includes planned exercise and things you do in daily life. There are four types of activity:  Aerobic.  Doing aerobic activity makes your heart and lungs strong.  Includes walking, dancing, and gardening.  Aim for at least 2½ hours spread throughout the week.  It improves your energy and can help you sleep better.  Muscle-strengthening.  This type of activity can help maintain muscle and strengthen bones.  Includes climbing stairs, using resistance bands, and lifting or carrying heavy loads.  Aim for at least twice a week.  It can help protect the knees and other joints.  Stretching.  Stretching gives you better range of motion in joints and muscles.  Includes upper arm stretches, calf stretches, and gentle yoga.  Aim for at least twice a week, preferably after your muscles are warmed up from other activities.  It can help you function better in daily life.  Balancing.  This helps you stay coordinated and have good posture.  Includes heel-to-toe walking, adrien chi, and certain types of yoga.  Aim for at least 3 days a week.  It can reduce your risk of falling.  Even if you have a hard time meeting the recommendations, it's better to be more active

## 2024-11-25 NOTE — PROGRESS NOTES
Medicare Annual Wellness Visit    Heladio Pacheco is here for Medicare AWV (Patient is here for an AWV and 3 month med follow up )    Assessment & Plan   Annual wellness visit  Need for influenza vaccination  -     Influenza, FLUAD Trivalent, (age 65 y+), IM, Preservative Free, 0.5mL  Prediabetes  -     POCT glycosylated hemoglobin (Hb A1C)  -     [employer] ([employer])- [facility] Medicare annual wellness visit, subsequent  History of atrial fibrillation  History of DVT (deep vein thrombosis)  Other specified hypothyroidism  Essential hypertension on hyzaar hctz  BMI 45.0-49.9, adult  -     [employer] ([employer])- [facility]    Bp just a little high, no change in meds  Watch bp at home  Prediabetes   - exercise recommended    Follow up 3 months    Recommendations for Preventive Services Due: see orders and patient instructions/AVS.  Recommended screening schedule for the next 5-10 years is provided to the patient in written form: see Patient Instructions/AVS.     No follow-ups on file.     Subjective     Htn  On hyzaar and metoprolol    Hypothyroidism  On 150 mcg levothyroxine    GERD   Does not take heatburn  Takes prilosec    Prediabetes     Lab Results   Component Value Date/Time    LABA1C 6.1 11/25/2024 08:41 AM    LABA1C 6.1 08/12/2024 08:02 AM    LABA1C 6.1 05/21/2024 12:23 PM       Patient's complete Health Risk Assessment and screening values have been reviewed and are found in Flowsheets. The following problems were reviewed today and where indicated follow up appointments were made and/or referrals ordered.    Positive Risk Factor Screenings with Interventions:              Inactivity:  On average, how many days per week do you engage in moderate to strenuous exercise (like a brisk walk)?: 0 days (!) Abnormal  On average, how many minutes do you engage in exercise at this level?: 0 min  Interventions:  In July quit walking  2 miles a day due to back  Had

## 2024-11-26 DIAGNOSIS — E03.9 HYPOTHYROIDISM, UNSPECIFIED TYPE: Primary | ICD-10-CM

## 2024-12-05 DIAGNOSIS — E03.9 HYPOTHYROIDISM, UNSPECIFIED TYPE: ICD-10-CM

## 2024-12-06 DIAGNOSIS — E03.9 HYPOTHYROIDISM, UNSPECIFIED TYPE: Primary | ICD-10-CM

## 2024-12-06 LAB — TSH SERPL DL<=0.005 MIU/L-ACNC: 3.93 UIU/ML (ref 0.27–4.2)

## 2024-12-06 RX ORDER — LEVOTHYROXINE SODIUM 175 UG/1
175 TABLET ORAL DAILY
Qty: 60 TABLET | Refills: 0 | Status: SHIPPED | OUTPATIENT
Start: 2024-12-06

## 2025-01-13 RX ORDER — LOSARTAN POTASSIUM AND HYDROCHLOROTHIAZIDE 12.5; 5 MG/1; MG/1
1 TABLET ORAL DAILY
Qty: 90 TABLET | Refills: 1 | Status: SHIPPED | OUTPATIENT
Start: 2025-01-13

## 2025-02-07 DIAGNOSIS — E03.9 HYPOTHYROIDISM, UNSPECIFIED TYPE: ICD-10-CM

## 2025-02-08 LAB — TSH SERPL DL<=0.005 MIU/L-ACNC: 3.4 UIU/ML (ref 0.27–4.2)

## 2025-02-10 RX ORDER — LEVOTHYROXINE SODIUM 175 UG/1
175 TABLET ORAL DAILY
Qty: 90 TABLET | Refills: 1 | Status: SHIPPED | OUTPATIENT
Start: 2025-02-10

## 2025-02-22 SDOH — ECONOMIC STABILITY: FOOD INSECURITY: WITHIN THE PAST 12 MONTHS, THE FOOD YOU BOUGHT JUST DIDN'T LAST AND YOU DIDN'T HAVE MONEY TO GET MORE.: NEVER TRUE

## 2025-02-22 SDOH — ECONOMIC STABILITY: FOOD INSECURITY: WITHIN THE PAST 12 MONTHS, YOU WORRIED THAT YOUR FOOD WOULD RUN OUT BEFORE YOU GOT MONEY TO BUY MORE.: NEVER TRUE

## 2025-02-22 SDOH — ECONOMIC STABILITY: INCOME INSECURITY: IN THE LAST 12 MONTHS, WAS THERE A TIME WHEN YOU WERE NOT ABLE TO PAY THE MORTGAGE OR RENT ON TIME?: NO

## 2025-02-22 SDOH — ECONOMIC STABILITY: TRANSPORTATION INSECURITY
IN THE PAST 12 MONTHS, HAS THE LACK OF TRANSPORTATION KEPT YOU FROM MEDICAL APPOINTMENTS OR FROM GETTING MEDICATIONS?: NO

## 2025-02-22 ASSESSMENT — PATIENT HEALTH QUESTIONNAIRE - PHQ9
SUM OF ALL RESPONSES TO PHQ QUESTIONS 1-9: 0
2. FEELING DOWN, DEPRESSED OR HOPELESS: NOT AT ALL
SUM OF ALL RESPONSES TO PHQ9 QUESTIONS 1 & 2: 0
1. LITTLE INTEREST OR PLEASURE IN DOING THINGS: NOT AT ALL
2. FEELING DOWN, DEPRESSED OR HOPELESS: NOT AT ALL
SUM OF ALL RESPONSES TO PHQ QUESTIONS 1-9: 0
SUM OF ALL RESPONSES TO PHQ9 QUESTIONS 1 & 2: 0
SUM OF ALL RESPONSES TO PHQ QUESTIONS 1-9: 0
SUM OF ALL RESPONSES TO PHQ QUESTIONS 1-9: 0
1. LITTLE INTEREST OR PLEASURE IN DOING THINGS: NOT AT ALL

## 2025-02-25 ENCOUNTER — HOSPITAL ENCOUNTER (OUTPATIENT)
Age: 75
Discharge: HOME | End: 2025-02-25
Payer: MEDICARE

## 2025-02-25 ENCOUNTER — HOSPITAL ENCOUNTER (OUTPATIENT)
Dept: GENERAL RADIOLOGY | Age: 75
Discharge: HOME | End: 2025-02-25
Attending: INTERNAL MEDICINE
Payer: MEDICARE

## 2025-02-25 ENCOUNTER — OFFICE VISIT (OUTPATIENT)
Dept: FAMILY MEDICINE CLINIC | Age: 75
End: 2025-02-25
Payer: MEDICARE

## 2025-02-25 VITALS
RESPIRATION RATE: 18 BRPM | HEART RATE: 93 BPM | OXYGEN SATURATION: 97 % | BODY MASS INDEX: 43.64 KG/M2 | SYSTOLIC BLOOD PRESSURE: 132 MMHG | WEIGHT: 287 LBS | DIASTOLIC BLOOD PRESSURE: 70 MMHG

## 2025-02-25 DIAGNOSIS — R06.2 WHEEZING: ICD-10-CM

## 2025-02-25 DIAGNOSIS — I49.3 FREQUENT UNIFOCAL PVCS: ICD-10-CM

## 2025-02-25 DIAGNOSIS — I49.9 IRREGULAR HEART RATE: ICD-10-CM

## 2025-02-25 DIAGNOSIS — R73.03 PREDIABETES: Primary | ICD-10-CM

## 2025-02-25 DIAGNOSIS — R05.1 ACUTE COUGH: ICD-10-CM

## 2025-02-25 DIAGNOSIS — Z86.79 HISTORY OF ATRIAL FIBRILLATION: ICD-10-CM

## 2025-02-25 LAB — HBA1C MFR BLD: 5.3 %

## 2025-02-25 PROCEDURE — 71046 X-RAY EXAM CHEST 2 VIEWS: CPT

## 2025-02-25 PROCEDURE — G8417 CALC BMI ABV UP PARAM F/U: HCPCS | Performed by: INTERNAL MEDICINE

## 2025-02-25 PROCEDURE — 1124F ACP DISCUSS-NO DSCNMKR DOCD: CPT | Performed by: INTERNAL MEDICINE

## 2025-02-25 PROCEDURE — 99214 OFFICE O/P EST MOD 30 MIN: CPT | Performed by: INTERNAL MEDICINE

## 2025-02-25 PROCEDURE — 93000 ELECTROCARDIOGRAM COMPLETE: CPT | Performed by: INTERNAL MEDICINE

## 2025-02-25 PROCEDURE — 3075F SYST BP GE 130 - 139MM HG: CPT | Performed by: INTERNAL MEDICINE

## 2025-02-25 PROCEDURE — G8427 DOCREV CUR MEDS BY ELIG CLIN: HCPCS | Performed by: INTERNAL MEDICINE

## 2025-02-25 PROCEDURE — 1160F RVW MEDS BY RX/DR IN RCRD: CPT | Performed by: INTERNAL MEDICINE

## 2025-02-25 PROCEDURE — 83036 HEMOGLOBIN GLYCOSYLATED A1C: CPT | Performed by: INTERNAL MEDICINE

## 2025-02-25 PROCEDURE — 3078F DIAST BP <80 MM HG: CPT | Performed by: INTERNAL MEDICINE

## 2025-02-25 PROCEDURE — 1036F TOBACCO NON-USER: CPT | Performed by: INTERNAL MEDICINE

## 2025-02-25 PROCEDURE — 1159F MED LIST DOCD IN RCRD: CPT | Performed by: INTERNAL MEDICINE

## 2025-02-25 RX ORDER — ALBUTEROL SULFATE 90 UG/1
2 INHALANT RESPIRATORY (INHALATION) 4 TIMES DAILY PRN
Qty: 18 G | Refills: 0 | Status: SHIPPED | OUTPATIENT
Start: 2025-02-25

## 2025-02-25 RX ORDER — BENZONATATE 100 MG/1
100-200 CAPSULE ORAL 3 TIMES DAILY PRN
Qty: 40 CAPSULE | Refills: 0 | Status: SHIPPED | OUTPATIENT
Start: 2025-02-25

## 2025-02-25 RX ORDER — METOPROLOL SUCCINATE 50 MG/1
50 TABLET, EXTENDED RELEASE ORAL DAILY
COMMUNITY
Start: 2025-02-25

## 2025-02-25 RX ORDER — METOPROLOL SUCCINATE 50 MG/1
50 TABLET, EXTENDED RELEASE ORAL DAILY
Qty: 30 TABLET | Refills: 0
Start: 2025-02-25 | End: 2025-02-25

## 2025-02-25 RX ORDER — PREDNISONE 20 MG/1
TABLET ORAL
Qty: 10 TABLET | Refills: 0 | Status: SHIPPED | OUTPATIENT
Start: 2025-02-25

## 2025-02-25 ASSESSMENT — ENCOUNTER SYMPTOMS: COUGH: 1

## 2025-02-25 NOTE — PROGRESS NOTES
Heladio Pacheco (:  1950) is a 75 y.o. male, here for evaluation of the following chief complaint(s):  Cough (Patient would also like to discuss his cough that has been going on for 3 weeks ) and Medication Check (Patient is here for a 3 month med follow up )      Assessment & Plan     Assessment/Plan  Heladio was seen today for cough and medication check.    Diagnoses and all orders for this visit:    Prediabetes  -     POCT glycosylated hemoglobin (Hb A1C)    Irregular heart rate  -     EKG 12 Lead - Clinic Performed    Acute cough  -     XR CHEST STANDARD (2 VW); Future  -     amoxicillin-clavulanate (AUGMENTIN) 875-125 MG per tablet; Take 1 tablet by mouth 2 times daily for 7 days  -     predniSONE (DELTASONE) 20 MG tablet; Take 3 tablets a day for one day Take 2 tablets a day for  2 days then take  1 tablet a day for 2 days then 1/2 pill for 2 days  -     albuterol sulfate HFA (VENTOLIN HFA) 108 (90 Base) MCG/ACT inhaler; Inhale 2 puffs into the lungs 4 times daily as needed for Wheezing    Wheezing  -     XR CHEST STANDARD (2 VW); Future  -     amoxicillin-clavulanate (AUGMENTIN) 875-125 MG per tablet; Take 1 tablet by mouth 2 times daily for 7 days  -     predniSONE (DELTASONE) 20 MG tablet; Take 3 tablets a day for one day Take 2 tablets a day for  2 days then take  1 tablet a day for 2 days then 1/2 pill for 2 days  -     albuterol sulfate HFA (VENTOLIN HFA) 108 (90 Base) MCG/ACT inhaler; Inhale 2 puffs into the lungs 4 times daily as needed for Wheezing    Frequent unifocal PVCs    History of atrial fibrillation    Other orders  -     benzonatate (TESSALON PERLES) 100 MG capsule; Take 1-2 capsules by mouth 3 times daily as needed for Cough  -     Discontinue: metoprolol succinate (TOPROL XL) 50 MG extended release tablet; Take 1 tablet by mouth daily Per cardiology     EKG   Freq  pvc's ,rbbb , new compared to EKG 22  Pt is not having symptoms of the ectopy  Will ck chest xy and will share EKG

## 2025-02-25 NOTE — PATIENT INSTRUCTIONS
[facility] podcast  Exercise is Medicine, But Are We Taking it?  Dr Mike Solorzano M.D.  27 min

## 2025-03-03 ENCOUNTER — TELEPHONE (OUTPATIENT)
Dept: FAMILY MEDICINE CLINIC | Age: 75
End: 2025-03-03

## 2025-03-03 ENCOUNTER — HOSPITAL ENCOUNTER (OUTPATIENT)
Dept: CT IMAGING | Age: 75
Discharge: HOME | End: 2025-03-03
Attending: INTERNAL MEDICINE
Payer: MEDICARE

## 2025-03-03 ENCOUNTER — OFFICE VISIT (OUTPATIENT)
Dept: FAMILY MEDICINE CLINIC | Age: 75
End: 2025-03-03
Payer: MEDICARE

## 2025-03-03 VITALS
BODY MASS INDEX: 43.94 KG/M2 | HEART RATE: 81 BPM | DIASTOLIC BLOOD PRESSURE: 60 MMHG | WEIGHT: 289 LBS | OXYGEN SATURATION: 96 % | SYSTOLIC BLOOD PRESSURE: 126 MMHG | RESPIRATION RATE: 20 BRPM

## 2025-03-03 DIAGNOSIS — R05.1 ACUTE COUGH: Primary | ICD-10-CM

## 2025-03-03 DIAGNOSIS — R05.9 COUGH IN ADULT: ICD-10-CM

## 2025-03-03 DIAGNOSIS — R91.8 LUNG FIELD ABNORMAL FINDING ON EXAMINATION: ICD-10-CM

## 2025-03-03 DIAGNOSIS — R05.1 ACUTE COUGH: ICD-10-CM

## 2025-03-03 DIAGNOSIS — T17.500A MUCUS PLUGGING OF BRONCHI: ICD-10-CM

## 2025-03-03 DIAGNOSIS — J21.9 ACUTE BRONCHIOLITIS DUE TO UNSPECIFIED ORGANISM: Primary | ICD-10-CM

## 2025-03-03 PROCEDURE — 3074F SYST BP LT 130 MM HG: CPT | Performed by: INTERNAL MEDICINE

## 2025-03-03 PROCEDURE — 1160F RVW MEDS BY RX/DR IN RCRD: CPT | Performed by: INTERNAL MEDICINE

## 2025-03-03 PROCEDURE — 1159F MED LIST DOCD IN RCRD: CPT | Performed by: INTERNAL MEDICINE

## 2025-03-03 PROCEDURE — G2211 COMPLEX E/M VISIT ADD ON: HCPCS | Performed by: INTERNAL MEDICINE

## 2025-03-03 PROCEDURE — 3078F DIAST BP <80 MM HG: CPT | Performed by: INTERNAL MEDICINE

## 2025-03-03 PROCEDURE — 1124F ACP DISCUSS-NO DSCNMKR DOCD: CPT | Performed by: INTERNAL MEDICINE

## 2025-03-03 PROCEDURE — 71250 CT THORAX DX C-: CPT

## 2025-03-03 PROCEDURE — 99214 OFFICE O/P EST MOD 30 MIN: CPT | Performed by: INTERNAL MEDICINE

## 2025-03-03 PROCEDURE — G8427 DOCREV CUR MEDS BY ELIG CLIN: HCPCS | Performed by: INTERNAL MEDICINE

## 2025-03-03 PROCEDURE — G8417 CALC BMI ABV UP PARAM F/U: HCPCS | Performed by: INTERNAL MEDICINE

## 2025-03-03 PROCEDURE — 1036F TOBACCO NON-USER: CPT | Performed by: INTERNAL MEDICINE

## 2025-03-03 RX ORDER — DEXTROMETHORPHAN HYDROBROMIDE AND PROMETHAZINE HYDROCHLORIDE 15; 6.25 MG/5ML; MG/5ML
SYRUP ORAL
Qty: 30 ML | Refills: 0 | Status: SHIPPED | OUTPATIENT
Start: 2025-03-03

## 2025-03-03 RX ORDER — AZITHROMYCIN 250 MG/1
TABLET, FILM COATED ORAL
Qty: 6 TABLET | Refills: 0 | Status: SHIPPED | OUTPATIENT
Start: 2025-03-03 | End: 2025-03-13

## 2025-03-03 ASSESSMENT — ENCOUNTER SYMPTOMS: COUGH: 1

## 2025-03-03 NOTE — PROGRESS NOTES
Heladio Pacheco (:  1950) is a 75 y.o. male, here for evaluation of the following chief complaint(s):  Cough (Pt is here for a cough that is still not getting better from his last OV )      Assessment & Plan     Assessment/Plan  Heladio was seen today for cough.    Diagnoses and all orders for this visit:    Acute cough  -     azithromycin (ZITHROMAX) 250 MG tablet; 500mg on day 1 followed by 250mg on days 2 - 5  -     CT CHEST WO CONTRAST; Future    Lung field abnormal finding on examination  -     CT CHEST WO CONTRAST; Future    Other orders  -     promethazine-dextromethorphan (PROMETHAZINE-DM) 6.25-15 MG/5ML syrup; Take 5 cc before bed as needed for a cough         Orders Placed This Encounter   Medications    promethazine-dextromethorphan (PROMETHAZINE-DM) 6.25-15 MG/5ML syrup     Sig: Take 5 cc before bed as needed for a cough     Dispense:  30 mL     Refill:  0    azithromycin (ZITHROMAX) 250 MG tablet     Simg on day 1 followed by 250mg on days 2 - 5     Dispense:  6 tablet     Refill:  0      Coughing   4 week  Feeling better  Will do CT  Add meds for cough  Add Zpac.      Follow up on the   Subjective   SUBJECTIVE/OBJECTIVE:  Cough      Coughing for  4 weeks  When he took steroid cough went away for  3 days , but the cough  came back    Mostly at night  Starts coughing before going to bed  Starts   4 hr before going to bed  Taking 2    3 x a day    Tried mucinex DM      Hemoglobin A1C (%)   Date Value   2025 5.3   2024 6.1   2024 6.1 (H)       Sodium   Date Value   2024 139 mEq/L   10/10/2023 141 mmol/L   2023 138 mmol/L     Potassium   Date Value   2024 3.9 mEq/L   10/10/2023 4.2 mmol/L   2023 4.2 mmol/L     Chloride   Date Value   2024 106 mEq/L   10/10/2023 102 mmol/L   2023 102 mmol/L     CO2 (mmol/L)   Date Value   2024 27   10/10/2023 27   2023 25     BUN (mg/dL)   Date Value   2024 14   2024 18   2024

## 2025-03-03 NOTE — TELEPHONE ENCOUNTER
Pt calling in, he is not getting any better from seeing Dr. Carvalho on 2/25. He is still coughing and Dr. Carvalho told him to let her know so she can determine next steps.    Pharm confirmed - [facility]    Please advise  Pt can be reached at [phone]

## 2025-03-11 ENCOUNTER — OFFICE VISIT (OUTPATIENT)
Dept: FAMILY MEDICINE CLINIC | Age: 75
End: 2025-03-11
Payer: MEDICARE

## 2025-03-11 VITALS
BODY MASS INDEX: 43.38 KG/M2 | DIASTOLIC BLOOD PRESSURE: 60 MMHG | RESPIRATION RATE: 20 BRPM | OXYGEN SATURATION: 95 % | WEIGHT: 286.2 LBS | HEIGHT: 68 IN | TEMPERATURE: 98.2 F | SYSTOLIC BLOOD PRESSURE: 134 MMHG | HEART RATE: 96 BPM

## 2025-03-11 DIAGNOSIS — I10 ESSENTIAL HYPERTENSION: ICD-10-CM

## 2025-03-11 DIAGNOSIS — Z79.01 CHRONIC ANTICOAGULATION: ICD-10-CM

## 2025-03-11 DIAGNOSIS — J21.9 ACUTE BRONCHIOLITIS DUE TO UNSPECIFIED ORGANISM: Primary | ICD-10-CM

## 2025-03-11 DIAGNOSIS — Z86.79 HISTORY OF ATRIAL FIBRILLATION: ICD-10-CM

## 2025-03-11 PROCEDURE — G2211 COMPLEX E/M VISIT ADD ON: HCPCS | Performed by: INTERNAL MEDICINE

## 2025-03-11 PROCEDURE — 1036F TOBACCO NON-USER: CPT | Performed by: INTERNAL MEDICINE

## 2025-03-11 PROCEDURE — G8427 DOCREV CUR MEDS BY ELIG CLIN: HCPCS | Performed by: INTERNAL MEDICINE

## 2025-03-11 PROCEDURE — 1160F RVW MEDS BY RX/DR IN RCRD: CPT | Performed by: INTERNAL MEDICINE

## 2025-03-11 PROCEDURE — 1159F MED LIST DOCD IN RCRD: CPT | Performed by: INTERNAL MEDICINE

## 2025-03-11 PROCEDURE — G8417 CALC BMI ABV UP PARAM F/U: HCPCS | Performed by: INTERNAL MEDICINE

## 2025-03-11 PROCEDURE — 1124F ACP DISCUSS-NO DSCNMKR DOCD: CPT | Performed by: INTERNAL MEDICINE

## 2025-03-11 PROCEDURE — 3078F DIAST BP <80 MM HG: CPT | Performed by: INTERNAL MEDICINE

## 2025-03-11 PROCEDURE — 99214 OFFICE O/P EST MOD 30 MIN: CPT | Performed by: INTERNAL MEDICINE

## 2025-03-11 PROCEDURE — 3075F SYST BP GE 130 - 139MM HG: CPT | Performed by: INTERNAL MEDICINE

## 2025-03-11 RX ORDER — BENZONATATE 100 MG/1
100-200 CAPSULE ORAL 3 TIMES DAILY PRN
Qty: 60 CAPSULE | Refills: 1 | Status: SHIPPED | OUTPATIENT
Start: 2025-03-11

## 2025-03-11 ASSESSMENT — ENCOUNTER SYMPTOMS: COUGH: 1

## 2025-03-11 NOTE — PROGRESS NOTES
Heladio Pacheco (:  1950) is a 75 y.o. male, here for evaluation of the following chief complaint(s):  Cough (F/u for cough.  X7 weeks.  Still has cough.  Clear/whit mucous mostly in the am. Some wheezing. )      Assessment & Plan     Assessment/Plan  Heladio was seen today for cough.    Diagnoses and all orders for this visit:    Acute bronchiolitis due to unspecified organism  -     benzonatate (TESSALON PERLES) 100 MG capsule; Take 1-2 capsules by mouth 3 times daily as needed for Cough    History of atrial fibrillation    Chronic anticoagulation afib,  dvt /pe after surgery,xarelto    Essential hypertension losartan /hctz  Comments:  bp just a little high today         Orders Placed This Encounter   Medications    benzonatate (TESSALON PERLES) 100 MG capsule     Sig: Take 1-2 capsules by mouth 3 times daily as needed for Cough     Dispense:  60 capsule     Refill:  1    Needs tetanus  At pharm  He asked about this  Bp just little high but coughing    Sees pulm next week      Subjective   SUBJECTIVE/OBJECTIVE:  Cough      Coughing for  7 weeks  No better  Clear sputum  White, not thick,     IMPRESSION:  1. Acute bilateral bronchiolitis.  2. Solid subcentimeter bilateral pulmonary nodules.     Tessalon perles helps    Has a 3 week old grandson    No fever or chills  No green or yellow sputum  u      Hemoglobin A1C (%)   Date Value   2025 5.3   2024 6.1   2024 6.1 (H)       Sodium   Date Value   2024 139 mEq/L   10/10/2023 141 mmol/L   2023 138 mmol/L     Potassium   Date Value   2024 3.9 mEq/L   10/10/2023 4.2 mmol/L   2023 4.2 mmol/L     Chloride   Date Value   2024 106 mEq/L   10/10/2023 102 mmol/L   2023 102 mmol/L     CO2 (mmol/L)   Date Value   2024 27   10/10/2023 27   2023 25     BUN (mg/dL)   Date Value   2024 14   2024 18   2024 17     Creatinine (mg/dL)   Date Value   2024 1.2   2024 1.11   2024

## 2025-03-19 ENCOUNTER — OFFICE VISIT (OUTPATIENT)
Dept: PULMONOLOGY | Age: 75
End: 2025-03-19
Payer: MEDICARE

## 2025-03-19 VITALS
HEIGHT: 68 IN | RESPIRATION RATE: 18 BRPM | TEMPERATURE: 97.5 F | OXYGEN SATURATION: 94 % | BODY MASS INDEX: 43.44 KG/M2 | WEIGHT: 286.6 LBS | DIASTOLIC BLOOD PRESSURE: 68 MMHG | SYSTOLIC BLOOD PRESSURE: 140 MMHG | HEART RATE: 86 BPM

## 2025-03-19 DIAGNOSIS — J20.9 ACUTE BRONCHITIS, UNSPECIFIED ORGANISM: Primary | ICD-10-CM

## 2025-03-19 PROCEDURE — 1124F ACP DISCUSS-NO DSCNMKR DOCD: CPT | Performed by: INTERNAL MEDICINE

## 2025-03-19 PROCEDURE — 1159F MED LIST DOCD IN RCRD: CPT | Performed by: INTERNAL MEDICINE

## 2025-03-19 PROCEDURE — 1036F TOBACCO NON-USER: CPT | Performed by: INTERNAL MEDICINE

## 2025-03-19 PROCEDURE — 99204 OFFICE O/P NEW MOD 45 MIN: CPT | Performed by: INTERNAL MEDICINE

## 2025-03-19 PROCEDURE — G8417 CALC BMI ABV UP PARAM F/U: HCPCS | Performed by: INTERNAL MEDICINE

## 2025-03-19 PROCEDURE — G8427 DOCREV CUR MEDS BY ELIG CLIN: HCPCS | Performed by: INTERNAL MEDICINE

## 2025-03-19 PROCEDURE — 1160F RVW MEDS BY RX/DR IN RCRD: CPT | Performed by: INTERNAL MEDICINE

## 2025-03-19 NOTE — PROGRESS NOTES
lungs are without acute focal process.  There is no effusion or  pneumothorax. The cardiomediastinal silhouette is stable. The osseous  structures are stable.    Impression  No acute process.        Pulmonary function testing  None on file        This note was transcribed using Dragon Dictation software. Please disregard any translational errors.    Yousef Al Ahwel, MD  [facility]

## 2025-04-25 ENCOUNTER — HOSPITAL ENCOUNTER (EMERGENCY)
Age: 75
Discharge: HOME | End: 2025-04-25
Attending: EMERGENCY MEDICINE
Payer: MEDICARE

## 2025-04-25 ENCOUNTER — APPOINTMENT (OUTPATIENT)
Dept: CT IMAGING | Age: 75
End: 2025-04-25
Payer: MEDICARE

## 2025-04-25 VITALS
WEIGHT: 283.73 LBS | BODY MASS INDEX: 43.14 KG/M2 | SYSTOLIC BLOOD PRESSURE: 156 MMHG | RESPIRATION RATE: 14 BRPM | DIASTOLIC BLOOD PRESSURE: 66 MMHG | HEART RATE: 88 BPM | TEMPERATURE: 98.1 F | OXYGEN SATURATION: 94 %

## 2025-04-25 DIAGNOSIS — N20.1 URETEROLITHIASIS: Primary | ICD-10-CM

## 2025-04-25 LAB
ALBUMIN SERPL-MCNC: 4.2 G/DL (ref 3.4–5)
ALBUMIN/GLOB SERPL: 1.8 {RATIO} (ref 1.1–2.2)
ALP SERPL-CCNC: 69 U/L (ref 40–129)
ALT SERPL-CCNC: 21 U/L (ref 10–40)
ANION GAP SERPL CALCULATED.3IONS-SCNC: 15 MMOL/L (ref 3–16)
AST SERPL-CCNC: 24 U/L (ref 15–37)
BACTERIA URNS QL MICRO: ABNORMAL /HPF
BASOPHILS # BLD: 0.1 K/UL (ref 0–0.2)
BASOPHILS NFR BLD: 0.5 %
BILIRUB SERPL-MCNC: 0.3 MG/DL (ref 0–1)
BILIRUB UR QL STRIP.AUTO: NEGATIVE
BUN SERPL-MCNC: 18 MG/DL (ref 7–20)
CALCIUM SERPL-MCNC: 9.1 MG/DL (ref 8.3–10.6)
CHLORIDE SERPL-SCNC: 105 MMOL/L (ref 99–110)
CLARITY UR: ABNORMAL
CO2 SERPL-SCNC: 21 MMOL/L (ref 21–32)
COLOR UR: ABNORMAL
CREAT SERPL-MCNC: 1.5 MG/DL (ref 0.8–1.3)
DEPRECATED RDW RBC AUTO: 14.4 % (ref 12.4–15.4)
EOSINOPHIL # BLD: 0.1 K/UL (ref 0–0.6)
EOSINOPHIL NFR BLD: 0.8 %
EPI CELLS #/AREA URNS AUTO: 1 /HPF (ref 0–5)
GFR SERPLBLD CREATININE-BSD FMLA CKD-EPI: 48 ML/MIN/{1.73_M2}
GLUCOSE SERPL-MCNC: 152 MG/DL (ref 70–99)
GLUCOSE UR STRIP.AUTO-MCNC: NEGATIVE MG/DL
HCT VFR BLD AUTO: 40.9 % (ref 40.5–52.5)
HGB BLD-MCNC: 13.9 G/DL (ref 13.5–17.5)
HGB UR QL STRIP.AUTO: ABNORMAL
HYALINE CASTS #/AREA URNS AUTO: 2 /LPF (ref 0–8)
KETONES UR STRIP.AUTO-MCNC: NEGATIVE MG/DL
LEUKOCYTE ESTERASE UR QL STRIP.AUTO: ABNORMAL
LIPASE SERPL-CCNC: 36 U/L (ref 13–60)
LYMPHOCYTES # BLD: 2 K/UL (ref 1–5.1)
LYMPHOCYTES NFR BLD: 21.7 %
MCH RBC QN AUTO: 33.7 PG (ref 26–34)
MCHC RBC AUTO-ENTMCNC: 34.1 G/DL (ref 31–36)
MCV RBC AUTO: 98.8 FL (ref 80–100)
MONOCYTES # BLD: 0.7 K/UL (ref 0–1.3)
MONOCYTES NFR BLD: 7.3 %
NEUTROPHILS # BLD: 6.6 K/UL (ref 1.7–7.7)
NEUTROPHILS NFR BLD: 69.7 %
NITRITE UR QL STRIP.AUTO: NEGATIVE
PH UR STRIP.AUTO: 5.5 [PH] (ref 5–8)
PLATELET # BLD AUTO: 205 K/UL (ref 135–450)
PMV BLD AUTO: 9.4 FL (ref 5–10.5)
POTASSIUM SERPL-SCNC: 4.3 MMOL/L (ref 3.5–5.1)
PROT SERPL-MCNC: 6.5 G/DL (ref 6.4–8.2)
PROT UR STRIP.AUTO-MCNC: 100 MG/DL
RBC # BLD AUTO: 4.14 M/UL (ref 4.2–5.9)
RBC CLUMPS #/AREA URNS AUTO: 73 /HPF (ref 0–4)
SODIUM SERPL-SCNC: 141 MMOL/L (ref 136–145)
SP GR UR STRIP.AUTO: 1.01 (ref 1–1.03)
UA COMPLETE W REFLEX CULTURE PNL UR: ABNORMAL
UA DIPSTICK W REFLEX MICRO PNL UR: YES
URN SPEC COLLECT METH UR: ABNORMAL
UROBILINOGEN UR STRIP-ACNC: 0.2 E.U./DL
WBC # BLD AUTO: 9.4 K/UL (ref 4–11)
WBC #/AREA URNS AUTO: 4 /HPF (ref 0–5)

## 2025-04-25 PROCEDURE — 81001 URINALYSIS AUTO W/SCOPE: CPT

## 2025-04-25 PROCEDURE — 96376 TX/PRO/DX INJ SAME DRUG ADON: CPT

## 2025-04-25 PROCEDURE — 96375 TX/PRO/DX INJ NEW DRUG ADDON: CPT

## 2025-04-25 PROCEDURE — 96374 THER/PROPH/DIAG INJ IV PUSH: CPT

## 2025-04-25 PROCEDURE — 99284 EMERGENCY DEPT VISIT MOD MDM: CPT

## 2025-04-25 PROCEDURE — 6360000002 HC RX W HCPCS: Performed by: EMERGENCY MEDICINE

## 2025-04-25 PROCEDURE — 83690 ASSAY OF LIPASE: CPT

## 2025-04-25 PROCEDURE — 85025 COMPLETE CBC W/AUTO DIFF WBC: CPT

## 2025-04-25 PROCEDURE — 80053 COMPREHEN METABOLIC PANEL: CPT

## 2025-04-25 PROCEDURE — 74176 CT ABD & PELVIS W/O CONTRAST: CPT

## 2025-04-25 PROCEDURE — 6370000000 HC RX 637 (ALT 250 FOR IP): Performed by: EMERGENCY MEDICINE

## 2025-04-25 RX ORDER — KETOROLAC TROMETHAMINE 15 MG/ML
15 INJECTION, SOLUTION INTRAMUSCULAR; INTRAVENOUS ONCE
Status: COMPLETED | OUTPATIENT
Start: 2025-04-25 | End: 2025-04-25

## 2025-04-25 RX ORDER — MORPHINE SULFATE 4 MG/ML
4 INJECTION, SOLUTION INTRAMUSCULAR; INTRAVENOUS
Refills: 0 | Status: DISCONTINUED | OUTPATIENT
Start: 2025-04-25 | End: 2025-04-25 | Stop reason: HOSPADM

## 2025-04-25 RX ORDER — OXYCODONE AND ACETAMINOPHEN 5; 325 MG/1; MG/1
1 TABLET ORAL EVERY 4 HOURS PRN
Qty: 17 TABLET | Refills: 0 | Status: SHIPPED | OUTPATIENT
Start: 2025-04-25 | End: 2025-04-28

## 2025-04-25 RX ORDER — OXYCODONE AND ACETAMINOPHEN 5; 325 MG/1; MG/1
1 TABLET ORAL ONCE
Refills: 0 | Status: COMPLETED | OUTPATIENT
Start: 2025-04-25 | End: 2025-04-25

## 2025-04-25 RX ORDER — ONDANSETRON 4 MG/1
4 TABLET, ORALLY DISINTEGRATING ORAL 3 TIMES DAILY PRN
Qty: 15 TABLET | Refills: 0 | Status: SHIPPED | OUTPATIENT
Start: 2025-04-25 | End: 2025-04-30

## 2025-04-25 RX ORDER — ONDANSETRON 2 MG/ML
4 INJECTION INTRAMUSCULAR; INTRAVENOUS
Status: DISCONTINUED | OUTPATIENT
Start: 2025-04-25 | End: 2025-04-25 | Stop reason: HOSPADM

## 2025-04-25 RX ADMIN — MORPHINE SULFATE 4 MG: 4 INJECTION INTRAVENOUS at 09:00

## 2025-04-25 RX ADMIN — KETOROLAC TROMETHAMINE 15 MG: 15 INJECTION, SOLUTION INTRAMUSCULAR; INTRAVENOUS at 06:41

## 2025-04-25 RX ADMIN — ONDANSETRON 4 MG: 2 INJECTION, SOLUTION INTRAMUSCULAR; INTRAVENOUS at 06:43

## 2025-04-25 RX ADMIN — MORPHINE SULFATE 4 MG: 4 INJECTION INTRAVENOUS at 06:43

## 2025-04-25 RX ADMIN — OXYCODONE HYDROCHLORIDE AND ACETAMINOPHEN 1 TABLET: 5; 325 TABLET ORAL at 10:14

## 2025-04-25 ASSESSMENT — PAIN - FUNCTIONAL ASSESSMENT
PAIN_FUNCTIONAL_ASSESSMENT: 0-10
PAIN_FUNCTIONAL_ASSESSMENT: ACTIVITIES ARE NOT PREVENTED

## 2025-04-25 ASSESSMENT — PAIN SCALES - GENERAL
PAINLEVEL_OUTOF10: 6
PAINLEVEL_OUTOF10: 10
PAINLEVEL_OUTOF10: 3
PAINLEVEL_OUTOF10: 6
PAINLEVEL_OUTOF10: 6

## 2025-04-25 ASSESSMENT — PAIN DESCRIPTION - LOCATION
LOCATION: FLANK
LOCATION: ABDOMEN
LOCATION: FLANK

## 2025-04-25 ASSESSMENT — PAIN DESCRIPTION - ORIENTATION
ORIENTATION: RIGHT

## 2025-04-25 ASSESSMENT — PAIN DESCRIPTION - PAIN TYPE: TYPE: ACUTE PAIN

## 2025-04-25 NOTE — ED PROVIDER NOTES
EMERGENCY DEPARTMENT ENCOUNTER     [facility] EMERGENCY DEPARTMENT     Pt Name: Heladio Pacheco   MRN: [id number]   Birthdate 1950   Date of evaluation: 4/25/2025   Provider: Noel Moya MD   PCP: Jenni Carvalho MD   Note Started: 6:39 AM EDT 4/25/25     CHIEF COMPLAINT     Chief Complaint   Patient presents with    Flank Pain     Pt present in the ED via self from home c/o right side flank pain that radiates to the back. Pt AXO 4, VSS. Pt has hx of kidney stones.        HISTORY OF PRESENT ILLNESS:  History from : Patient   Limitations to history : None     Heladio Pacheco is a 75 y.o. male who presents for right flank pain.  Patient reports abrupt onset of right flank pain around 3 hours prior to arrival.  He states that the pain is very severe and radiates from the right side of the back to the right lower quadrant of the abdomen.  No vomiting.  No fevers, chills or sweats.  No urinary symptoms.  No diarrhea.  Patient has a history of ureterolithiasis but states this feels different in his opinion.  Status post cholecystectomy.  No chest pain or shortness of breath.    Nursing Notes were all reviewed and agreed with or any disagreements were addressed in the HPI.     ROS: Positives and Pertinent negatives as per HPI.    PAST MEDICAL HISTORY     Past medical history:  has a past medical history of Allergic rhinitis, seasonal, Bacterial meningitis, Congenital absence of kidney, Diverticulitis, colon, Gout, Gynecomastia, blood clots, Hydrocele, left, Lumbar disc disorder, Sleep apnea, Spermatocele, Talipes cavus, Tinnitus, and Wears contact lenses.    Past surgical history:  has a past surgical history that includes other surgical history (01/01/1998); Knee arthroscopy (09/01/2012); Total knee arthroplasty (Left, 05/17/2013); colectomy (01/01/1996); Colonoscopy; Cholecystectomy; Hydrocele surgery; other surgical history (10/21/2013); Breast lumpectomy (Left, 08/12/2014); Breast surgery  is obese.  No rebound, rigidity or guarding on abdominal exam.  Patient in moderate distress due to pain and was standing through most of my evaluation.    DIAGNOSTIC RESULTS   LABS:   Labs Reviewed   CBC WITH AUTO DIFFERENTIAL - Abnormal; Notable for the following components:       Result Value    RBC 4.14 (*)     All other components within normal limits   COMPREHENSIVE METABOLIC PANEL W/ REFLEX TO MG FOR LOW K - Abnormal; Notable for the following components:    Glucose 152 (*)     Creatinine 1.5 (*)     Est, Glom Filt Rate 48 (*)     All other components within normal limits   URINALYSIS WITH REFLEX TO CULTURE - Abnormal; Notable for the following components:    Clarity, UA CLOUDY (*)     Blood, Urine LARGE (*)     Protein,  (*)     Leukocyte Esterase, Urine SMALL (*)     All other components within normal limits   MICROSCOPIC URINALYSIS - Abnormal; Notable for the following components:    RBC, UA 73 (*)     All other components within normal limits   LIPASE      When ordered only abnormal lab results are displayed. All other labs were within normal range or not returned as of this dictation.     RADIOLOGY:    Interpretation per the Radiologist below, if available at the time of this note:    CT ABDOMEN PELVIS WO CONTRAST Additional Contrast? None   Final Result   1. 3 mm partially obstructing right UPJ stone.   2. Atrophic left kidney.   3. Cholecystectomy.   4. Extensive postoperative changes of the spine and right hip.                  EMERGENCY DEPARTMENT COURSE and DIFFERENTIAL DIAGNOSIS/MDM:          Vitals:    Vitals:    04/25/25 0940 04/25/25 1015 04/25/25 1130 04/25/25 1200   BP:  (!) 169/73 (!) 155/69 (!) 156/66   Pulse: 99 98 85 88   Resp: 16 16 13 14   Temp:       SpO2: 97% 96% 93% 94%   Weight:            Patient was given the following medications:   Medications   morphine (PF) injection 4 mg (4 mg IntraVENous Given 4/25/25 0900)   ondansetron (ZOFRAN) injection 4 mg (4 mg IntraVENous Given

## 2025-04-25 NOTE — ED NOTES
Discharge and education instructions reviewed. Patient verbalized understanding, teach-back successful. Patient denied questions at this time. No acute distress noted. Patient instructed to follow-up as noted - return to emergency department if symptoms worsen. Patient verbalized understanding. Discharged per EDMD with discharge instructions.   
Pt attempting to provide urine sample.  
Pt states unable to provide urine sample at this time.  
Quality 130: Documentation Of Current Medications In The Medical Record: Current Medications Documented
Quality 431: Preventive Care And Screening: Unhealthy Alcohol Use - Screening: Patient not identified as an unhealthy alcohol user when screened for unhealthy alcohol use using a systematic screening method
Detail Level: Detailed

## 2025-04-27 ENCOUNTER — HOSPITAL ENCOUNTER (OUTPATIENT)
Age: 75
Setting detail: OBSERVATION
Discharge: HOME | End: 2025-04-29
Attending: STUDENT IN AN ORGANIZED HEALTH CARE EDUCATION/TRAINING PROGRAM | Admitting: HOSPITALIST
Payer: MEDICARE

## 2025-04-27 ENCOUNTER — APPOINTMENT (OUTPATIENT)
Dept: CT IMAGING | Age: 75
End: 2025-04-27
Payer: MEDICARE

## 2025-04-27 DIAGNOSIS — N13.9 OBSTRUCTIVE UROPATHY: ICD-10-CM

## 2025-04-27 DIAGNOSIS — R34 ANURIA: ICD-10-CM

## 2025-04-27 DIAGNOSIS — N17.9 AKI (ACUTE KIDNEY INJURY): Primary | ICD-10-CM

## 2025-04-27 LAB
ALBUMIN SERPL-MCNC: 4.1 G/DL (ref 3.4–5)
ALBUMIN/GLOB SERPL: 1.7 {RATIO} (ref 1.1–2.2)
ALP SERPL-CCNC: 64 U/L (ref 40–129)
ALT SERPL-CCNC: 19 U/L (ref 10–40)
ANION GAP SERPL CALCULATED.3IONS-SCNC: 10 MMOL/L (ref 3–16)
AST SERPL-CCNC: 29 U/L (ref 15–37)
BASOPHILS # BLD: 0.1 K/UL (ref 0–0.2)
BASOPHILS NFR BLD: 0.8 %
BILIRUB SERPL-MCNC: 0.8 MG/DL (ref 0–1)
BUN SERPL-MCNC: 27 MG/DL (ref 7–20)
CALCIUM SERPL-MCNC: 8.5 MG/DL (ref 8.3–10.6)
CHLORIDE SERPL-SCNC: 105 MMOL/L (ref 99–110)
CO2 SERPL-SCNC: 26 MMOL/L (ref 21–32)
CREAT SERPL-MCNC: 3.3 MG/DL (ref 0.8–1.3)
DEPRECATED RDW RBC AUTO: 14.2 % (ref 12.4–15.4)
EOSINOPHIL # BLD: 0.1 K/UL (ref 0–0.6)
EOSINOPHIL NFR BLD: 1.1 %
GFR SERPLBLD CREATININE-BSD FMLA CKD-EPI: 19 ML/MIN/{1.73_M2}
GLUCOSE SERPL-MCNC: 114 MG/DL (ref 70–99)
HCT VFR BLD AUTO: 39.3 % (ref 40.5–52.5)
HGB BLD-MCNC: 13.9 G/DL (ref 13.5–17.5)
LIPASE SERPL-CCNC: 34 U/L (ref 13–60)
LYMPHOCYTES # BLD: 1.2 K/UL (ref 1–5.1)
LYMPHOCYTES NFR BLD: 11.4 %
MCH RBC QN AUTO: 34.4 PG (ref 26–34)
MCHC RBC AUTO-ENTMCNC: 35.3 G/DL (ref 31–36)
MCV RBC AUTO: 97.4 FL (ref 80–100)
MONOCYTES # BLD: 1.2 K/UL (ref 0–1.3)
MONOCYTES NFR BLD: 11.2 %
NEUTROPHILS # BLD: 8.2 K/UL (ref 1.7–7.7)
NEUTROPHILS NFR BLD: 75.5 %
PLATELET # BLD AUTO: 186 K/UL (ref 135–450)
PMV BLD AUTO: 9.3 FL (ref 5–10.5)
POTASSIUM SERPL-SCNC: 4.8 MMOL/L (ref 3.5–5.1)
PROT SERPL-MCNC: 6.5 G/DL (ref 6.4–8.2)
RBC # BLD AUTO: 4.03 M/UL (ref 4.2–5.9)
SODIUM SERPL-SCNC: 141 MMOL/L (ref 136–145)
WBC # BLD AUTO: 10.9 K/UL (ref 4–11)

## 2025-04-27 PROCEDURE — 80053 COMPREHEN METABOLIC PANEL: CPT

## 2025-04-27 PROCEDURE — 6360000002 HC RX W HCPCS: Performed by: STUDENT IN AN ORGANIZED HEALTH CARE EDUCATION/TRAINING PROGRAM

## 2025-04-27 PROCEDURE — 51701 INSERT BLADDER CATHETER: CPT

## 2025-04-27 PROCEDURE — 85025 COMPLETE CBC W/AUTO DIFF WBC: CPT

## 2025-04-27 PROCEDURE — 51798 US URINE CAPACITY MEASURE: CPT

## 2025-04-27 PROCEDURE — 96374 THER/PROPH/DIAG INJ IV PUSH: CPT

## 2025-04-27 PROCEDURE — 83690 ASSAY OF LIPASE: CPT

## 2025-04-27 PROCEDURE — 74176 CT ABD & PELVIS W/O CONTRAST: CPT

## 2025-04-27 PROCEDURE — 99285 EMERGENCY DEPT VISIT HI MDM: CPT

## 2025-04-27 RX ORDER — MORPHINE SULFATE 4 MG/ML
4 INJECTION, SOLUTION INTRAMUSCULAR; INTRAVENOUS ONCE
Refills: 0 | Status: COMPLETED | OUTPATIENT
Start: 2025-04-27 | End: 2025-04-27

## 2025-04-27 RX ADMIN — MORPHINE SULFATE 4 MG: 4 INJECTION, SOLUTION INTRAMUSCULAR; INTRAVENOUS at 22:08

## 2025-04-27 ASSESSMENT — PAIN - FUNCTIONAL ASSESSMENT
PAIN_FUNCTIONAL_ASSESSMENT: ACTIVITIES ARE NOT PREVENTED
PAIN_FUNCTIONAL_ASSESSMENT: 0-10

## 2025-04-27 ASSESSMENT — PAIN DESCRIPTION - PAIN TYPE: TYPE: ACUTE PAIN

## 2025-04-27 ASSESSMENT — PAIN SCALES - GENERAL
PAINLEVEL_OUTOF10: 6
PAINLEVEL_OUTOF10: 8
PAINLEVEL_OUTOF10: 8

## 2025-04-27 ASSESSMENT — PAIN DESCRIPTION - ONSET: ONSET: ON-GOING

## 2025-04-27 ASSESSMENT — PAIN DESCRIPTION - FREQUENCY: FREQUENCY: CONTINUOUS

## 2025-04-27 ASSESSMENT — PAIN DESCRIPTION - DESCRIPTORS
DESCRIPTORS: ACHING
DESCRIPTORS: ACHING

## 2025-04-27 ASSESSMENT — PAIN DESCRIPTION - ORIENTATION
ORIENTATION: RIGHT
ORIENTATION: RIGHT;LOWER

## 2025-04-27 ASSESSMENT — PAIN DESCRIPTION - LOCATION
LOCATION: ABDOMEN;BACK
LOCATION: ABDOMEN
LOCATION: ABDOMEN;BACK

## 2025-04-28 ENCOUNTER — ANESTHESIA (OUTPATIENT)
Dept: OPERATING ROOM | Age: 75
End: 2025-04-28
Payer: MEDICARE

## 2025-04-28 ENCOUNTER — ANESTHESIA EVENT (OUTPATIENT)
Dept: OPERATING ROOM | Age: 75
End: 2025-04-28
Payer: MEDICARE

## 2025-04-28 PROBLEM — N13.2 HYDRONEPHROSIS WITH RENAL CALCULOUS OBSTRUCTION: Status: ACTIVE | Noted: 2025-04-28

## 2025-04-28 PROBLEM — E66.813 OBESITY, CLASS III, BMI 40-49.9 (MORBID OBESITY) (HCC): Status: ACTIVE | Noted: 2025-04-28

## 2025-04-28 PROBLEM — I48.91 HYPERCOAGULABLE STATE DUE TO ATRIAL FIBRILLATION (HCC): Status: ACTIVE | Noted: 2025-04-28

## 2025-04-28 PROBLEM — I48.0 PAROXYSMAL ATRIAL FIBRILLATION (HCC): Status: ACTIVE | Noted: 2023-01-24

## 2025-04-28 PROBLEM — D68.69 HYPERCOAGULABLE STATE, SECONDARY: Status: ACTIVE | Noted: 2025-04-28

## 2025-04-28 PROBLEM — N17.9 AKI (ACUTE KIDNEY INJURY): Status: ACTIVE | Noted: 2025-04-28

## 2025-04-28 PROBLEM — N13.2 HYDRONEPHROSIS WITH URINARY OBSTRUCTION DUE TO RENAL CALCULUS: Status: ACTIVE | Noted: 2025-04-28

## 2025-04-28 PROBLEM — N20.0 KIDNEY STONE: Status: ACTIVE | Noted: 2025-04-28

## 2025-04-28 PROBLEM — Q60.0 SOLITARY KIDNEY, CONGENITAL: Status: ACTIVE | Noted: 2025-04-28

## 2025-04-28 LAB
ANION GAP SERPL CALCULATED.3IONS-SCNC: 12 MMOL/L (ref 3–16)
BACTERIA URNS QL MICRO: ABNORMAL /HPF
BASOPHILS # BLD: 0 K/UL (ref 0–0.2)
BASOPHILS NFR BLD: 0.4 %
BILIRUB UR QL STRIP.AUTO: NEGATIVE
BUN SERPL-MCNC: 25 MG/DL (ref 7–20)
CALCIUM SERPL-MCNC: 8.3 MG/DL (ref 8.3–10.6)
CHARACTER UR: ABNORMAL
CHLORIDE SERPL-SCNC: 102 MMOL/L (ref 99–110)
CLARITY UR: CLEAR
CO2 SERPL-SCNC: 22 MMOL/L (ref 21–32)
COLOR UR: YELLOW
CREAT SERPL-MCNC: 2.6 MG/DL (ref 0.8–1.3)
DEPRECATED RDW RBC AUTO: 14.3 % (ref 12.4–15.4)
EOSINOPHIL # BLD: 0 K/UL (ref 0–0.6)
EOSINOPHIL NFR BLD: 0 %
EPI CELLS #/AREA URNS AUTO: 2 /HPF (ref 0–5)
GFR SERPLBLD CREATININE-BSD FMLA CKD-EPI: 25 ML/MIN/{1.73_M2}
GLUCOSE SERPL-MCNC: 252 MG/DL (ref 70–99)
GLUCOSE UR STRIP.AUTO-MCNC: NEGATIVE MG/DL
HCT VFR BLD AUTO: 37.8 % (ref 40.5–52.5)
HGB BLD-MCNC: 13.4 G/DL (ref 13.5–17.5)
HGB UR QL STRIP.AUTO: ABNORMAL
HYALINE CASTS #/AREA URNS AUTO: 0 /LPF (ref 0–8)
KETONES UR STRIP.AUTO-MCNC: NEGATIVE MG/DL
LEUKOCYTE ESTERASE UR QL STRIP.AUTO: ABNORMAL
LYMPHOCYTES # BLD: 0.5 K/UL (ref 1–5.1)
LYMPHOCYTES NFR BLD: 7.1 %
MCH RBC QN AUTO: 34.6 PG (ref 26–34)
MCHC RBC AUTO-ENTMCNC: 35.4 G/DL (ref 31–36)
MCV RBC AUTO: 97.9 FL (ref 80–100)
MONOCYTES # BLD: 0.1 K/UL (ref 0–1.3)
MONOCYTES NFR BLD: 2 %
NEUTROPHILS # BLD: 6.6 K/UL (ref 1.7–7.7)
NEUTROPHILS NFR BLD: 90.5 %
NITRITE UR QL STRIP.AUTO: NEGATIVE
PH UR STRIP.AUTO: 5 [PH] (ref 5–8)
PLATELET # BLD AUTO: 180 K/UL (ref 135–450)
PMV BLD AUTO: 9.3 FL (ref 5–10.5)
POTASSIUM SERPL-SCNC: 4.6 MMOL/L (ref 3.5–5.1)
PROT UR STRIP.AUTO-MCNC: ABNORMAL MG/DL
RBC # BLD AUTO: 3.87 M/UL (ref 4.2–5.9)
RBC #/AREA URNS HPF: ABNORMAL /HPF (ref 0–4)
SODIUM SERPL-SCNC: 136 MMOL/L (ref 136–145)
SP GR UR STRIP.AUTO: 1.01 (ref 1–1.03)
UA COMPLETE W REFLEX CULTURE PNL UR: ABNORMAL
UA DIPSTICK W REFLEX MICRO PNL UR: YES
URN SPEC COLLECT METH UR: ABNORMAL
UROBILINOGEN UR STRIP-ACNC: 0.2 E.U./DL
WBC # BLD AUTO: 7.3 K/UL (ref 4–11)
WBC #/AREA URNS AUTO: 6 /HPF (ref 0–5)

## 2025-04-28 PROCEDURE — 3700000000 HC ANESTHESIA ATTENDED CARE: Performed by: UROLOGY

## 2025-04-28 PROCEDURE — 6360000002 HC RX W HCPCS: Performed by: ANESTHESIOLOGY

## 2025-04-28 PROCEDURE — C2617 STENT, NON-COR, TEM W/O DEL: HCPCS | Performed by: UROLOGY

## 2025-04-28 PROCEDURE — 94760 N-INVAS EAR/PLS OXIMETRY 1: CPT

## 2025-04-28 PROCEDURE — 3600000002 HC SURGERY LEVEL 2 BASE: Performed by: UROLOGY

## 2025-04-28 PROCEDURE — 6370000000 HC RX 637 (ALT 250 FOR IP): Performed by: UROLOGY

## 2025-04-28 PROCEDURE — 2500000003 HC RX 250 WO HCPCS: Performed by: ANESTHESIOLOGY

## 2025-04-28 PROCEDURE — 36415 COLL VENOUS BLD VENIPUNCTURE: CPT

## 2025-04-28 PROCEDURE — 81001 URINALYSIS AUTO W/SCOPE: CPT

## 2025-04-28 PROCEDURE — 2580000003 HC RX 258: Performed by: ANESTHESIOLOGY

## 2025-04-28 PROCEDURE — 2500000003 HC RX 250 WO HCPCS: Performed by: UROLOGY

## 2025-04-28 PROCEDURE — 2709999900 HC NON-CHARGEABLE SUPPLY: Performed by: UROLOGY

## 2025-04-28 PROCEDURE — G0378 HOSPITAL OBSERVATION PER HR: HCPCS

## 2025-04-28 PROCEDURE — 2580000003 HC RX 258: Performed by: HOSPITALIST

## 2025-04-28 PROCEDURE — C1769 GUIDE WIRE: HCPCS | Performed by: UROLOGY

## 2025-04-28 PROCEDURE — 6370000000 HC RX 637 (ALT 250 FOR IP): Performed by: HOSPITALIST

## 2025-04-28 PROCEDURE — 85025 COMPLETE CBC W/AUTO DIFF WBC: CPT

## 2025-04-28 PROCEDURE — 7100000000 HC PACU RECOVERY - FIRST 15 MIN: Performed by: UROLOGY

## 2025-04-28 PROCEDURE — 7100000001 HC PACU RECOVERY - ADDTL 15 MIN: Performed by: UROLOGY

## 2025-04-28 PROCEDURE — 80048 BASIC METABOLIC PNL TOTAL CA: CPT

## 2025-04-28 PROCEDURE — 3700000001 HC ADD 15 MINUTES (ANESTHESIA): Performed by: UROLOGY

## 2025-04-28 PROCEDURE — 3600000012 HC SURGERY LEVEL 2 ADDTL 15MIN: Performed by: UROLOGY

## 2025-04-28 DEVICE — URETERAL STENT
Type: IMPLANTABLE DEVICE | Site: URETER | Status: FUNCTIONAL
Brand: CONTOUR™

## 2025-04-28 RX ORDER — SODIUM CHLORIDE 9 MG/ML
INJECTION, SOLUTION INTRAVENOUS CONTINUOUS
Status: DISCONTINUED | OUTPATIENT
Start: 2025-04-28 | End: 2025-04-29 | Stop reason: HOSPADM

## 2025-04-28 RX ORDER — SODIUM CHLORIDE 0.9 % (FLUSH) 0.9 %
5-40 SYRINGE (ML) INJECTION EVERY 12 HOURS SCHEDULED
Status: DISCONTINUED | OUTPATIENT
Start: 2025-04-28 | End: 2025-04-29 | Stop reason: HOSPADM

## 2025-04-28 RX ORDER — ACETAMINOPHEN 325 MG/1
650 TABLET ORAL EVERY 6 HOURS PRN
Status: DISCONTINUED | OUTPATIENT
Start: 2025-04-28 | End: 2025-04-29 | Stop reason: HOSPADM

## 2025-04-28 RX ORDER — FENTANYL CITRATE 50 UG/ML
25 INJECTION, SOLUTION INTRAMUSCULAR; INTRAVENOUS EVERY 5 MIN PRN
Status: DISCONTINUED | OUTPATIENT
Start: 2025-04-28 | End: 2025-04-28

## 2025-04-28 RX ORDER — TAMSULOSIN HYDROCHLORIDE 0.4 MG/1
0.4 CAPSULE ORAL DAILY
Status: DISCONTINUED | OUTPATIENT
Start: 2025-04-28 | End: 2025-04-29 | Stop reason: HOSPADM

## 2025-04-28 RX ORDER — SUCCINYLCHOLINE/SOD CL,ISO/PF 200MG/10ML
SYRINGE (ML) INTRAVENOUS
Status: DISCONTINUED | OUTPATIENT
Start: 2025-04-28 | End: 2025-04-28 | Stop reason: SDUPTHER

## 2025-04-28 RX ORDER — TIRZEPATIDE 7.5 MG/.5ML
INJECTION, SOLUTION SUBCUTANEOUS WEEKLY
COMMUNITY

## 2025-04-28 RX ORDER — PROPOFOL 10 MG/ML
INJECTION, EMULSION INTRAVENOUS
Status: DISCONTINUED | OUTPATIENT
Start: 2025-04-28 | End: 2025-04-28 | Stop reason: SDUPTHER

## 2025-04-28 RX ORDER — METOPROLOL SUCCINATE 50 MG/1
50 TABLET, EXTENDED RELEASE ORAL DAILY
Status: DISCONTINUED | OUTPATIENT
Start: 2025-04-28 | End: 2025-04-29 | Stop reason: HOSPADM

## 2025-04-28 RX ORDER — MAGNESIUM HYDROXIDE 1200 MG/15ML
LIQUID ORAL CONTINUOUS PRN
Status: COMPLETED | OUTPATIENT
Start: 2025-04-28 | End: 2025-04-28

## 2025-04-28 RX ORDER — LIDOCAINE HYDROCHLORIDE 20 MG/ML
INJECTION, SOLUTION EPIDURAL; INFILTRATION; INTRACAUDAL; PERINEURAL
Status: DISCONTINUED | OUTPATIENT
Start: 2025-04-28 | End: 2025-04-28 | Stop reason: SDUPTHER

## 2025-04-28 RX ORDER — DEXAMETHASONE SODIUM PHOSPHATE 4 MG/ML
INJECTION, SOLUTION INTRA-ARTICULAR; INTRALESIONAL; INTRAMUSCULAR; INTRAVENOUS; SOFT TISSUE
Status: DISCONTINUED | OUTPATIENT
Start: 2025-04-28 | End: 2025-04-28 | Stop reason: SDUPTHER

## 2025-04-28 RX ORDER — ONDANSETRON 2 MG/ML
INJECTION INTRAMUSCULAR; INTRAVENOUS
Status: DISCONTINUED | OUTPATIENT
Start: 2025-04-28 | End: 2025-04-28 | Stop reason: SDUPTHER

## 2025-04-28 RX ORDER — ONDANSETRON 2 MG/ML
4 INJECTION INTRAMUSCULAR; INTRAVENOUS EVERY 6 HOURS PRN
Status: DISCONTINUED | OUTPATIENT
Start: 2025-04-28 | End: 2025-04-29 | Stop reason: HOSPADM

## 2025-04-28 RX ORDER — SODIUM CHLORIDE 9 MG/ML
INJECTION, SOLUTION INTRAVENOUS PRN
Status: DISCONTINUED | OUTPATIENT
Start: 2025-04-28 | End: 2025-04-29 | Stop reason: HOSPADM

## 2025-04-28 RX ORDER — ALBUTEROL SULFATE 90 UG/1
2 INHALANT RESPIRATORY (INHALATION) EVERY 6 HOURS PRN
Status: DISCONTINUED | OUTPATIENT
Start: 2025-04-28 | End: 2025-04-29 | Stop reason: HOSPADM

## 2025-04-28 RX ORDER — MECLIZINE HCL 12.5 MG 12.5 MG/1
25 TABLET ORAL 3 TIMES DAILY PRN
Status: DISCONTINUED | OUTPATIENT
Start: 2025-04-28 | End: 2025-04-29 | Stop reason: HOSPADM

## 2025-04-28 RX ORDER — NALOXONE HYDROCHLORIDE 0.4 MG/ML
INJECTION, SOLUTION INTRAMUSCULAR; INTRAVENOUS; SUBCUTANEOUS PRN
Status: DISCONTINUED | OUTPATIENT
Start: 2025-04-28 | End: 2025-04-28

## 2025-04-28 RX ORDER — SODIUM CHLORIDE 0.9 % (FLUSH) 0.9 %
5-40 SYRINGE (ML) INJECTION EVERY 12 HOURS SCHEDULED
Status: DISCONTINUED | OUTPATIENT
Start: 2025-04-28 | End: 2025-04-28

## 2025-04-28 RX ORDER — ONDANSETRON 2 MG/ML
4 INJECTION INTRAMUSCULAR; INTRAVENOUS
Status: DISCONTINUED | OUTPATIENT
Start: 2025-04-28 | End: 2025-04-28

## 2025-04-28 RX ORDER — CEFAZOLIN SODIUM 1 G/3ML
INJECTION, POWDER, FOR SOLUTION INTRAMUSCULAR; INTRAVENOUS
Status: DISCONTINUED | OUTPATIENT
Start: 2025-04-28 | End: 2025-04-28 | Stop reason: SDUPTHER

## 2025-04-28 RX ORDER — FENTANYL CITRATE 50 UG/ML
INJECTION, SOLUTION INTRAMUSCULAR; INTRAVENOUS
Status: DISCONTINUED | OUTPATIENT
Start: 2025-04-28 | End: 2025-04-28 | Stop reason: SDUPTHER

## 2025-04-28 RX ORDER — ONDANSETRON 4 MG/1
4 TABLET, ORALLY DISINTEGRATING ORAL EVERY 8 HOURS PRN
Status: DISCONTINUED | OUTPATIENT
Start: 2025-04-28 | End: 2025-04-29 | Stop reason: HOSPADM

## 2025-04-28 RX ORDER — SODIUM CHLORIDE 9 MG/ML
INJECTION, SOLUTION INTRAVENOUS
Status: DISCONTINUED | OUTPATIENT
Start: 2025-04-28 | End: 2025-04-28 | Stop reason: SDUPTHER

## 2025-04-28 RX ORDER — SODIUM CHLORIDE 0.9 % (FLUSH) 0.9 %
5-40 SYRINGE (ML) INJECTION PRN
Status: DISCONTINUED | OUTPATIENT
Start: 2025-04-28 | End: 2025-04-28

## 2025-04-28 RX ORDER — SODIUM CHLORIDE 0.9 % (FLUSH) 0.9 %
5-40 SYRINGE (ML) INJECTION PRN
Status: DISCONTINUED | OUTPATIENT
Start: 2025-04-28 | End: 2025-04-29 | Stop reason: HOSPADM

## 2025-04-28 RX ORDER — ACETAMINOPHEN 650 MG/1
650 SUPPOSITORY RECTAL EVERY 6 HOURS PRN
Status: DISCONTINUED | OUTPATIENT
Start: 2025-04-28 | End: 2025-04-29 | Stop reason: HOSPADM

## 2025-04-28 RX ORDER — 0.9 % SODIUM CHLORIDE 0.9 %
1000 INTRAVENOUS SOLUTION INTRAVENOUS ONCE
Status: DISCONTINUED | OUTPATIENT
Start: 2025-04-28 | End: 2025-04-28

## 2025-04-28 RX ORDER — PHENYLEPHRINE HCL IN 0.9% NACL 1 MG/10 ML
SYRINGE (ML) INTRAVENOUS
Status: DISCONTINUED | OUTPATIENT
Start: 2025-04-28 | End: 2025-04-28 | Stop reason: SDUPTHER

## 2025-04-28 RX ORDER — SODIUM CHLORIDE 9 MG/ML
INJECTION, SOLUTION INTRAVENOUS PRN
Status: DISCONTINUED | OUTPATIENT
Start: 2025-04-28 | End: 2025-04-28

## 2025-04-28 RX ORDER — POLYETHYLENE GLYCOL 3350 17 G/17G
17 POWDER, FOR SOLUTION ORAL DAILY PRN
Status: DISCONTINUED | OUTPATIENT
Start: 2025-04-28 | End: 2025-04-29 | Stop reason: HOSPADM

## 2025-04-28 RX ADMIN — LIDOCAINE HYDROCHLORIDE 80 MG: 20 INJECTION, SOLUTION EPIDURAL; INFILTRATION; INTRACAUDAL; PERINEURAL at 01:41

## 2025-04-28 RX ADMIN — TAMSULOSIN HYDROCHLORIDE 0.4 MG: 0.4 CAPSULE ORAL at 14:30

## 2025-04-28 RX ADMIN — FENTANYL CITRATE 100 MCG: 50 INJECTION INTRAMUSCULAR; INTRAVENOUS at 01:41

## 2025-04-28 RX ADMIN — SODIUM CHLORIDE: 0.9 INJECTION, SOLUTION INTRAVENOUS at 12:27

## 2025-04-28 RX ADMIN — CEFAZOLIN SODIUM 2 G: 1 INJECTION, POWDER, FOR SOLUTION INTRAMUSCULAR; INTRAVENOUS at 01:47

## 2025-04-28 RX ADMIN — Medication 100 MCG: at 01:51

## 2025-04-28 RX ADMIN — METOPROLOL SUCCINATE 50 MG: 50 TABLET, EXTENDED RELEASE ORAL at 14:30

## 2025-04-28 RX ADMIN — SODIUM CHLORIDE: 9 INJECTION, SOLUTION INTRAVENOUS at 01:35

## 2025-04-28 RX ADMIN — ONDANSETRON 4 MG: 2 INJECTION INTRAMUSCULAR; INTRAVENOUS at 01:41

## 2025-04-28 RX ADMIN — PROPOFOL 200 MG: 10 INJECTION, EMULSION INTRAVENOUS at 01:41

## 2025-04-28 RX ADMIN — Medication 140 MG: at 01:41

## 2025-04-28 RX ADMIN — SODIUM CHLORIDE: 0.9 INJECTION, SOLUTION INTRAVENOUS at 22:23

## 2025-04-28 RX ADMIN — Medication 3 MG: at 22:21

## 2025-04-28 RX ADMIN — DEXAMETHASONE SODIUM PHOSPHATE 10 MG: 4 INJECTION, SOLUTION INTRAMUSCULAR; INTRAVENOUS at 01:41

## 2025-04-28 RX ADMIN — SODIUM CHLORIDE, PRESERVATIVE FREE 10 ML: 5 INJECTION INTRAVENOUS at 09:17

## 2025-04-28 RX ADMIN — LEVOTHYROXINE SODIUM 175 MCG: 0.12 TABLET ORAL at 14:30

## 2025-04-28 ASSESSMENT — PAIN DESCRIPTION - DESCRIPTORS: DESCRIPTORS: SHARP

## 2025-04-28 ASSESSMENT — PAIN - FUNCTIONAL ASSESSMENT: PAIN_FUNCTIONAL_ASSESSMENT: NONE - DENIES PAIN

## 2025-04-28 ASSESSMENT — PAIN SCALES - GENERAL
PAINLEVEL_OUTOF10: 0
PAINLEVEL_OUTOF10: 6

## 2025-04-28 ASSESSMENT — PAIN DESCRIPTION - LOCATION: LOCATION: BACK

## 2025-04-28 ASSESSMENT — PAIN DESCRIPTION - ORIENTATION: ORIENTATION: RIGHT

## 2025-04-28 NOTE — PROGRESS NOTES
[place]    [facility] [place]      Name:  Heladio Pacheco /Age/Sex: 1950  (75 y.o. male)   MRN & CSN:  [id number] & [id number] Encounter Date/Time: 2025 11:57 AM EDT   Location:  [id number]/3108-01 PCP: Jenni Carvalho MD     Attending:Samantha Hernandez MD       Hospital Day: 2    Assessment and Recommendations   Heladio Pacheco is a 75 y.o. male with pmh of  who presents with Hydronephrosis with urinary obstruction due to renal calculus      Plan:     MELONIE, etiology thought to be secondary to obstruction    Status post cystoscopy and right ureteral stent placed    IVF NS  Moniter BMP  Avoid NSAIDs  Avoid contrast  Avoid ACEI or ARB  Keep MAP > 65 mmhg    Follow-up nephrology recommendation    Took me more than 51  minutes for clinical management, coordination of care, reviewing labs , ordering medication as needed , reviewing consultants notes and recommendation  including plan of care discussion with the patient ,nursing staff and case management and consultants        Diet ADULT DIET; Regular   DVT Prophylaxis [] Lovenox, []  Heparin, [] SCDs, [] Ambulation,  [] Eliquis, [] Xarelto  [] Coumadin   Code Status Full Code   Disposition From:   Expected Disposition:   Estimated Date of Discharge:  Patient requires continued admission due to    Surrogate Decision Maker/ POA         Personally reviewed Lab Studies and Imaging        Telemetry reviewed by myself no ST elevation              Drugs that require monitoring for toxicity include IVF    and the method of monitoring was CBC , BMP and vitals monitering          Medical Decision Making:  The following items were considered in medical decision making:  Discussion of patient care with other providers  Reviewed clinical lab tests  Reviewed radiology tests  Reviewed other diagnostic tests/interventions  Independent review of radiologic images  Microbiology cultures and other micro tests reviewed            Subjective:     Chief Complaint:     Heladio DOWD

## 2025-04-28 NOTE — PROGRESS NOTES
Patient arrived to unit from OR and was placed into room 3108. Patient A&O x 4. Patient oriented to room, unit routine, call light/telephone use, bed/chair alarm implementation, and meal ordering process. Patient reports understanding, denies questions. Patient denies physical/emotional needs at this time. Call light, telephone, and bed side table are within reach. Fall precautions in place. Will continue to monitor and assess.        Electronically signed by Manuela Ocampo RN on 4/28/2025 at 4:01 AM

## 2025-04-28 NOTE — BRIEF OP NOTE
Brief Postoperative Note      Patient: Heladio Pacheco  YOB: 1950  MRN: [id number]    Date of Procedure: 4/28/2025    Pre op:  right ureteral calculus, MELONIE, solitary right kidney    Post-Op Diagnosis: Same       Procedure(s):  CYSTOSCOPY RIGHT URETERAL STENT INSERTION    Surgeon(s):  Clementine Marrero MD    Assistant:  * No surgical staff found *    Anesthesia: Monitor Anesthesia Care    Estimated Blood Loss (mL): Minimal    Complications: None    Specimens:   * No specimens in log *    Implants:  Implant Name Type Inv. Item Serial No.  Lot No. LRB No. Used Action   STENT URET 6FR L26CM PERCFLX HYDR+ TAPR TIP GRAD - [id number]  STENT URET 6FR L26CM PERCFLX HYDR+ TAPR TIP GRAD  [employer] [facility]- [id number] Right 1 Implanted         Drains: * No LDAs found *    Findings:  Infection Present At Time Of Surgery (PATOS) (choose all levels that have infection present):  No infection present  Other Findings: BPH  Electronically signed by Clementine Marrero MD on 4/28/2025 at 2:00 AM

## 2025-04-28 NOTE — CONSULTS
Consulting Physician: Isaac    Reason for Consult: right flank pain, MELONIE    History of Present Illness: Heladio Pacheco is a 75 y.o. with solitary right kidney complaining of right flank pain and no urine output in > 24 hours.  CT shows obstructing stone in the right ureter and severely atrophic left kidney.  Cr up to 3.0 K 4.8    No fever    Past Medical History:   Past Medical History:   Diagnosis Date    Allergic rhinitis, seasonal     Bacterial meningitis     HX OF     Congenital absence of kidney     born with one kidney    Diverticulitis, colon     Gout     Gynecomastia     Hx of blood clots     postop knee replacement    Hydrocele, left     Lumbar disc disorder     Sleep apnea     uses cpap machine    Spermatocele     LEFT    Talipes cavus     Tinnitus     Wears contact lenses        Past Surgical History:  Past Surgical History:   Procedure Laterality Date    ARM SURGERY Left 12/11/2023    EXCISION OF LEFT UPPER ARM LIPOMA performed by Noel Castillo MD at [facility] OR    BREAST LUMPECTOMY Left 08/12/2014    lipoma    BREAST SURGERY Right 02/04/2015    removal of lipoma right breast.    CHOLECYSTECTOMY      COLECTOMY  01/01/1996    partial for diverticulitis    COLONOSCOPY      HYDROCELE EXCISION      and spermatacele excision    JOINT REPLACEMENT      KNEE ARTHROSCOPY  09/01/2012    left --dr mar    OTHER SURGICAL HISTORY  01/01/1998    GYNECOMASTIA S/P EXCISION     OTHER SURGICAL HISTORY  10/21/2013    BILATERAL DECOMPRESSIVE LUMBAR LAMINECTOMY L4-L5, POSSIBLE    SPINAL CORD STIMULATOR IMPLANTATION N/A 04/08/2019    SPINAL CORD STIMULATOR  TRIAL WITH NEVRO USING FLUOROSCOPY performed by Carmela Bautista MD at [facility]    [facility]        Social History:  Social History     Socioeconomic History    Marital status:      Spouse name: Kenia    Number of children: 4    Years of education: Not on file    Highest education level: Not

## 2025-04-28 NOTE — ANESTHESIA PRE PROCEDURE
[facility] Department of Anesthesiology  Pre-Anesthesia Evaluation/Consultation       Name:  Heladio Pacheco  : 1950  Age:  75 y.o.                                           MRN:  [id number]  Date: 2025           Surgeon: Surgeon(s):  Clementine Marrero MD    Procedure: Procedure(s):  CYSTOSCOPY URETERAL STENT INSERTION     Allergies   Allergen Reactions   • No Known Allergies    • Ozempic (0.25 Or 0.5 Mg-Dose) [Semaglutide(0.25 Or 0.5mg-Dos)]      Caused terrible diarrhea     Patient Active Problem List   Diagnosis   • Colonic polyps(LAST COLO 5/15--pos mult small polyps--REPEAT 5 YR)--dr lynn,3/24  polyp return 4 yr   • Tinnitus,CHRONIC   • Hydrocele, left   • Spermatocele,LEFT   • Allergic rhinitis, seasonal   • Gynecomastia, male   • Diverticulitis of colon   • Baker's cyst of knee-left   • Primary localized osteoarthrosis, lower leg   • Spinal stenosis-pos affecting bilat lower legs--saw dr riddle at [place] 10/13--s/p surgery   • Vitamin D deficiency--started 50,000 iu 2x/ wk    • Neuropathic pain of both legs (HCC)--dr kelley for w/u    • Idiopathic chronic gout of ankle without tophus   • Right-sided low back pain with right-sided  sciatica -gabapentin  had SE, cymbalta(20 mg, SE with  30mg)PT helped  at [place]   • Right hip pain   • Other specified hypothyroidism   • Nephrolithiasis--right kidney ---? symptomatic from them-sent to dr galvan    • Abnormal finding of kidney--left kidney chronically shrunken--? etiol--nl renal fx    • Elevated BP without diagnosis of hypertension   • Lumbar radiculopathy   • Lesion of subcutaneous tissue   • Lipoma of left upper extremity   • Hyperglycemia   • Osteopenia   • YOVANI (obstructive sleep apnea)   • Current use of long term anticoagulation -DVT x 2, told by hematology could stop xarelto (dr. Richards) per pt, cardiology wanted him on it for life   • Prediabetes   • History of atrial fibrillation   • History of

## 2025-04-28 NOTE — H&P
V2.0  History and Physical      Name:  Heladio Pacheco /Age/Sex: 1950  (75 y.o. male)   MRN & CSN:  [id number] & [id number] Encounter Date/Time: 2025 12:55 AM EDT   Location:   PCP: Jenni Carvalho MD       Hospital Day: 2    Assessment and Plan:   Heladio Pacheco is a 75 y.o. 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  Keep patient n.p.o.  Morphine as needed  Blood pressure is not within goal.  Resume home blood pressure medication when verified.  As needed hydralazine ordered.  Trend.  Obesity class III-BMI of 43.14 kg/m².  Complicates care.  Lifestyle modification recommended.  MELONIE-likely secondary to obstruction      Advance care planning:  Advanced care planning was discussed with patient for a total of  16 minutes.  Full code, DNR CC, DNR CCA, power of  and living will were discussed.  Patient elected to be a full code.   Disposition:   Current Living situation: Home  Expected Disposition: Home  Estimated D/C: 2 days    Diet N.p.o.   DVT Prophylaxis [] Lovenox, []  Heparin, [x] SCDs, [] Ambulation,  [] Eliquis, [] Xarelto, [] Coumadin   Code Status Full code   Surrogate Decision Maker/ POA Wife     Personally reviewed Lab Studies and Imaging     Discussed management of the

## 2025-04-28 NOTE — CONSULTS
[facility]  Phone: [phone]  Fax: [phone]  [facility]         Reason for Consult: MELONIE  Requesting Physician:  Dr. Hernandez    History Obtained From:  patient, electronic medical record    History of Present Ilness: 75-year-old pleasant white gentleman with history of hypertension, prediabetes and mild CKD with a baseline creatinine of around 1.1 mg presented to the emergency room last night with complaints of right sided lower abdominal pain, abrupt onset radiating to his right flank  He has history of kidney stones and follows with Dr. Miranda but has never required any intervention previously  Has history of solitary functioning right kidney with atrophic left kidney  Apparently had a stent placed recently  Denies dysuria hematuria fever nausea vomiting  Noted to have MELONIE with a creatinine of 3.3 mg  CT scan showed 3 mm partially obstructing stone in the right UPJ J mild perinephric stranding left kidney noted to be atrophic  Patient underwent ureteral stent placement overnight by urology  This morning he feels improved abdominal pain has resolved he has mild dysuria  Creatinine improving to 2.6 mg      Past Medical History:        Diagnosis Date    Allergic rhinitis, seasonal     Bacterial meningitis     HX OF     Congenital absence of kidney     born with one kidney    Diverticulitis, colon     Gout     Gynecomastia     Hx of blood clots     postop knee replacement    Hydrocele, left     Lumbar disc disorder     Sleep apnea     uses cpap machine    Spermatocele     LEFT    Talipes cavus     Tinnitus     Wears contact lenses        Past Surgical History:        Procedure Laterality Date    ARM SURGERY Left 12/11/2023    EXCISION OF LEFT UPPER ARM LIPOMA performed by Noel Castillo MD at [facility] OR    BREAST LUMPECTOMY Left 08/12/2014    lipoma    BREAST SURGERY Right 02/04/2015    removal of lipoma right breast.    CHOLECYSTECTOMY      COLECTOMY  01/01/1996    partial for

## 2025-04-28 NOTE — OP NOTE
[facility]          [address] [place] [address]                            OPERATIVE REPORT      PATIENT NAME: JANNETTE PATRICK               : 1950  MED REC NO: [id number]                      ROOM: OR  ACCOUNT NO: [id number]                       ADMIT DATE: 2025  PROVIDER: Clementine Marrero MD      DATE OF PROCEDURE:  2025    SURGEON:  Clementine Marrero MD    PREOPERATIVE DIAGNOSIS:  Right ureteral calculus, acute kidney injury, solitary right kidney.    POSTOPERATIVE DIAGNOSIS:  Right ureteral calculus, acute kidney injury, solitary right kidney.    PROCEDURES:  Cystoscopy, right ureteral stent insertion.    ANESTHESIA:  General.    COMPLICATIONS:  None.    BLOOD LOSS:  Minimal.    INDICATIONS:  75-year-old gentleman with a solitary right kidney, presented with right flank pain and no urine output for over 24 hours.  Creatinine was up to 3.3.  CT scan showed a 3 mm obstructing stone in the mid to distal right ureter.  He is here for urgent stent insertion.    DESCRIPTION OF PROCEDURE:  He was given Ancef and taken to the cystoscopy suite.  He moved onto the table.  Anesthesia was induced.  His position was changed to the lithotomy position.  His genitalia were prepped and draped.  The 21-Nicaraguan cystoscope was advanced through the urethra into the bladder.  There were no urethral abnormalities.  The prostate was enlarged with a high bladder neck.  The bladder was empty.  The right ureteral orifice was identified.  A 0.035 zip wire was advanced through the right ureteral orifice up to the kidney under fluoroscopic guidance.  A 6-Nicaraguan x 26 cm double-J stent was then advanced over the wire in the typical fashion.  The wire was withdrawn.  Fluoroscopy showed the stent was in good position.  Then, the bladder was drained, and the scope was withdrawn.  The patient was awakened and transferred to recovery.    POSTOPERATIVE PLAN:

## 2025-04-28 NOTE — PROGRESS NOTES
4 Eyes Skin Assessment     NAME:  Heladio Pacheco  YOB: 1950  MEDICAL RECORD NUMBER:  [id number]    The patient is being assessed for  Admission    I agree that at least one RN has performed a thorough Head to Toe Skin Assessment on the patient. ALL assessment sites listed below have been assessed.      Areas assessed by both nurses:    Head, Face, Ears, Shoulders, Back, Chest, Arms, Elbows, Hands, Sacrum. Buttock, Coccyx, Ischium, Legs. Feet and Heels, and Under Medical Devices         Does the Patient have a Wound? No noted wound(s)       Devon Prevention initiated by RN: No  Wound Care Orders initiated by RN: No    Pressure Injury (Stage 3,4, Unstageable, DTI, NWPT, and Complex wounds) if present, place Wound referral order by RN under : No    New Ostomies, if present place, Ostomy referral order under : No     Nurse 1 eSignature: Electronically signed by Manuela Ocampo RN on 4/28/25 at 4:02 AM EDT    **SHARE this note so that the co-signing nurse can place an eSignature**    Nurse 2 eSignature: {Esignature:[id number]}

## 2025-04-28 NOTE — CARE COORDINATION
Per chart review, patient is alert and oriented, independent, has insurance.  Please advise Case Management if patient will have discharge planning needs.     Electronically signed by NISHA Goodman, SANDEEPW, Case Management on 4/28/2025 at 3:07 PM  [place] [phone]

## 2025-04-28 NOTE — ANESTHESIA POSTPROCEDURE EVALUATION
[facility] Department of Anesthesiology  Post-Anesthesia Note       Name:  Hleadio Pacheco                                  Age:  75 y.o.  MRN:  [id number]     Last Vitals & Oxygen Saturation: BP (!) 160/70   Pulse (!) 116   Temp 97.9 °F (36.6 °C) (Temporal)   Resp 20   SpO2 94%   Patient Vitals for the past 4 hrs:   BP Temp Temp src Pulse Resp SpO2   04/28/25 0208 (!) 160/70 97.9 °F (36.6 °C) Temporal (!) 116 20 94 %   04/28/25 0100 139/87 -- -- 96 23 98 %   04/28/25 0045 (!) 167/90 -- -- 90 16 98 %   04/28/25 0030 (!) 148/84 -- -- 84 12 96 %   04/28/25 0015 (!) 155/68 -- -- 85 10 98 %   04/28/25 0000 (!) 149/64 -- -- 85 14 93 %   04/27/25 2345 (!) 165/71 -- -- 95 15 95 %   04/27/25 2344 (!) 165/71 -- -- 95 15 95 %   04/27/25 2330 (!) 164/68 -- -- 90 13 91 %   04/27/25 2315 (!) 162/74 -- -- 88 15 94 %   04/27/25 2300 (!) 141/108 -- -- 88 16 94 %   04/27/25 2245 (!) 153/72 -- -- 89 12 93 %   04/27/25 2230 (!) 163/75 -- -- 88 13 96 %   04/27/25 2215 (!) 151/71 -- -- 86 15 95 %       Level of consciousness:  Awake, alert to baseline    Respiratory: Respirations easy, no distress. Stable.    Cardiovascular: Hemodynamically stable.    Hydration: Adequate.    PONV: Adequately managed.    Post-op pain: Adequately controlled.    Post-op assessment: Tolerated anesthetic well without complication.    Complications:  None.    Christian Crowe MD  April 28, 2025   2:14 AM

## 2025-04-28 NOTE — ED TRIAGE NOTES
Patient presents after being seen here on Friday with concerns for urinary retention and Kidney stone. He reports not having any urinary output since 10 pm last night.

## 2025-04-28 NOTE — PROGRESS NOTES
Patient is alert and oriented x 4. I helped patient to the restroom then noted small amount of blood from his penis. Will continue to monitor.    Electronically signed by Manuela Ocampo RN on 4/28/2025 at 4:32 AM

## 2025-04-28 NOTE — ED PROVIDER NOTES
however some errors may be present due to limitations of this technology and occasionally words are not transcribed correctly.       Francis Gray MD  04/28/25 0039

## 2025-04-28 NOTE — PROGRESS NOTES
Patient in bed, A&O X4. VSS. Right PIV flushed, dressing CDI, NS locked and capped at this time. Patient denies pain.  All AM medications taken whole without complaint (see eMAR).  Patient tolerating PO intake and appetite adequate. No other needs verbalized at this time. Patient reports positive output, educated to continue to use the urinal at bedside for accurate documentation of urin output. Urine sample collected for UA and sent to lab. Standard safety precautions in place and call light within reach.

## 2025-04-28 NOTE — PROGRESS NOTES
Pt Name: Heladio Pacheco  Medical Record Number: [id number]  Date of Birth 1950   Today's Date: 4/28/2025      Subjective:  NAEON. Patient renal function improving. He feels \"fantastic\"    ROS: Constitutional: No fever    Vitals:  Vitals:    04/28/25 0252 04/28/25 0629 04/28/25 0720 04/28/25 0817   BP: 137/65  (!) 152/76    Pulse: 100  94    Resp: 18  18    Temp: 98.2 °F (36.8 °C)  97.7 °F (36.5 °C)    TempSrc: Oral  Oral    SpO2: 92%  95% 95%   Weight:  121 kg (266 lb 12.1 oz)     Height: 1.727 m (5' 8\")        I/O last 3 completed shifts:  In: 505 [P.O.:480; I.V.:25]  Out: 0     Exam:  General: Awake, oriented, no acute distress  Respiratory: Nonlabored breathing  Abdomen: Soft, non-tender, non-distended, no masses  : spontaneously voids      CURRENT MEDICATIONS   Scheduled Meds:   sodium chloride flush  5-40 mL IntraVENous 2 times per day     Continuous Infusions:   sodium chloride       PRN Meds:.sodium chloride flush, sodium chloride, ondansetron **OR** ondansetron, polyethylene glycol, acetaminophen **OR** acetaminophen, melatonin    LABS     Recent Labs     04/27/25  2145 04/28/25  0843   WBC 10.9 7.3   HGB 13.9 13.4*   HCT 39.3* 37.8*    180     --    K 4.8  --      --    CO2 26  --    BUN 27*  --    CREATININE 3.3*  --    CALCIUM 8.5  --    AST 29  --    ALT 19  --    BILITOT 0.8  --            ASSESSMENT   Hospital day # 0  76yo yo male w/ solitary right kidney. S/p right ureteral stent placement overnight due to obstructing right ureteral stone and MELONIE  Renal function improving, Cr 2.6 from 3.3 yesterday  PLAN   CTN to monitor renal function  He will need interval ureteroscopy in 1-2 weeks for definitive stone treatment, we will reach out to schedule this once renal function improved.     Tayla Suggs, APRN - CNP 4/28/2025 9:14 AM

## 2025-04-29 VITALS
TEMPERATURE: 97.9 F | HEIGHT: 68 IN | OXYGEN SATURATION: 94 % | SYSTOLIC BLOOD PRESSURE: 133 MMHG | HEART RATE: 76 BPM | BODY MASS INDEX: 41.67 KG/M2 | DIASTOLIC BLOOD PRESSURE: 73 MMHG | RESPIRATION RATE: 18 BRPM | WEIGHT: 274.91 LBS

## 2025-04-29 LAB
ANION GAP SERPL CALCULATED.3IONS-SCNC: 10 MMOL/L (ref 3–16)
BASOPHILS # BLD: 0 K/UL (ref 0–0.2)
BASOPHILS NFR BLD: 0.3 %
BUN SERPL-MCNC: 20 MG/DL (ref 7–20)
CALCIUM SERPL-MCNC: 8.7 MG/DL (ref 8.3–10.6)
CHLORIDE SERPL-SCNC: 106 MMOL/L (ref 99–110)
CO2 SERPL-SCNC: 25 MMOL/L (ref 21–32)
CREAT SERPL-MCNC: 1.4 MG/DL (ref 0.8–1.3)
DEPRECATED RDW RBC AUTO: 14.2 % (ref 12.4–15.4)
EOSINOPHIL # BLD: 0 K/UL (ref 0–0.6)
EOSINOPHIL NFR BLD: 0.1 %
GFR SERPLBLD CREATININE-BSD FMLA CKD-EPI: 52 ML/MIN/{1.73_M2}
GLUCOSE SERPL-MCNC: 132 MG/DL (ref 70–99)
HCT VFR BLD AUTO: 35.9 % (ref 40.5–52.5)
HGB BLD-MCNC: 12.6 G/DL (ref 13.5–17.5)
LYMPHOCYTES # BLD: 1.4 K/UL (ref 1–5.1)
LYMPHOCYTES NFR BLD: 12.1 %
MCH RBC QN AUTO: 34.2 PG (ref 26–34)
MCHC RBC AUTO-ENTMCNC: 35.1 G/DL (ref 31–36)
MCV RBC AUTO: 97.3 FL (ref 80–100)
MONOCYTES # BLD: 0.7 K/UL (ref 0–1.3)
MONOCYTES NFR BLD: 6.5 %
NEUTROPHILS # BLD: 9.2 K/UL (ref 1.7–7.7)
NEUTROPHILS NFR BLD: 81 %
PLATELET # BLD AUTO: 189 K/UL (ref 135–450)
PMV BLD AUTO: 9.3 FL (ref 5–10.5)
POTASSIUM SERPL-SCNC: 4.6 MMOL/L (ref 3.5–5.1)
RBC # BLD AUTO: 3.68 M/UL (ref 4.2–5.9)
SODIUM SERPL-SCNC: 141 MMOL/L (ref 136–145)
WBC # BLD AUTO: 11.4 K/UL (ref 4–11)

## 2025-04-29 PROCEDURE — 80048 BASIC METABOLIC PNL TOTAL CA: CPT

## 2025-04-29 PROCEDURE — 36415 COLL VENOUS BLD VENIPUNCTURE: CPT

## 2025-04-29 PROCEDURE — 94760 N-INVAS EAR/PLS OXIMETRY 1: CPT

## 2025-04-29 PROCEDURE — G0378 HOSPITAL OBSERVATION PER HR: HCPCS

## 2025-04-29 PROCEDURE — 96361 HYDRATE IV INFUSION ADD-ON: CPT

## 2025-04-29 PROCEDURE — 6370000000 HC RX 637 (ALT 250 FOR IP): Performed by: HOSPITALIST

## 2025-04-29 PROCEDURE — 85025 COMPLETE CBC W/AUTO DIFF WBC: CPT

## 2025-04-29 RX ADMIN — LEVOTHYROXINE SODIUM 175 MCG: 0.12 TABLET ORAL at 06:02

## 2025-04-29 RX ADMIN — RIVAROXABAN 20 MG: 20 TABLET, FILM COATED ORAL at 09:20

## 2025-04-29 RX ADMIN — TAMSULOSIN HYDROCHLORIDE 0.4 MG: 0.4 CAPSULE ORAL at 09:20

## 2025-04-29 RX ADMIN — METOPROLOL SUCCINATE 50 MG: 50 TABLET, EXTENDED RELEASE ORAL at 09:20

## 2025-04-29 NOTE — PROGRESS NOTES
Patient in bed, A&O X4. VSS. Right PIV flushed, dressing CDI, Infusing at this time. Patient denies pain.  All AM medications taken whole without complaint (see eMAR).  Patient tolerating PO intake and appetite adequate. No other needs verbalized at this time. Patient reports voiding without discomfort or pain. Continuing to monitor output with urinal at bedside. Standard safety precautions in place and call light within reach.

## 2025-04-29 NOTE — PLAN OF CARE
Problem: Discharge Planning  Goal: Discharge to home or other facility with appropriate resources  4/28/2025 1200 by Kathy Ramos RN  Outcome: Progressing  Flowsheets (Taken 4/28/2025 1200)  Discharge to home or other facility with appropriate resources:   Identify barriers to discharge with patient and caregiver   Arrange for needed discharge resources and transportation as appropriate   Refer to discharge planning if patient needs post-hospital services based on physician order or complex needs related to functional status, cognitive ability or social support system   Identify discharge learning needs (meds, wound care, etc)  4/28/2025 0403 by Manuela Ocampo, RN  Outcome: Progressing  Flowsheets (Taken 4/28/2025 0403)  Discharge to home or other facility with appropriate resources:   Refer to discharge planning if patient needs post-hospital services based on physician order or complex needs related to functional status, cognitive ability or social support system   Identify barriers to discharge with patient and caregiver   Identify discharge learning needs (meds, wound care, etc)   Arrange for needed discharge resources and transportation as appropriate     Problem: Pain  Goal: Verbalizes/displays adequate comfort level or baseline comfort level  4/28/2025 1200 by Kathy Ramos RN  Outcome: Progressing  Flowsheets (Taken 4/28/2025 1200)  Verbalizes/displays adequate comfort level or baseline comfort level:   Encourage patient to monitor pain and request assistance   Implement non-pharmacological measures as appropriate and evaluate response   Consider cultural and social influences on pain and pain management   Assess pain using appropriate pain scale   Administer analgesics based on type and severity of pain and evaluate response  4/28/2025 0403 by Manuela Ocampo, RN  Outcome: Progressing  Flowsheets (Taken 4/28/2025 0403)  Verbalizes/displays adequate comfort level or baseline comfort level:   Consider 
  Problem: Discharge Planning  Goal: Discharge to home or other facility with appropriate resources  4/29/2025 1050 by Kathy Ramos RN  Outcome: Completed  Flowsheets (Taken 4/29/2025 1050)  Discharge to home or other facility with appropriate resources:   Identify barriers to discharge with patient and caregiver   Arrange for needed discharge resources and transportation as appropriate   Refer to discharge planning if patient needs post-hospital services based on physician order or complex needs related to functional status, cognitive ability or social support system   Identify discharge learning needs (meds, wound care, etc)  4/28/2025 2306 by Estela Campos, RN  Outcome: Progressing  Flowsheets (Taken 4/28/2025 2306)  Discharge to home or other facility with appropriate resources:   Identify barriers to discharge with patient and caregiver   Identify discharge learning needs (meds, wound care, etc)   Arrange for needed discharge resources and transportation as appropriate     Problem: Pain  Goal: Verbalizes/displays adequate comfort level or baseline comfort level  4/29/2025 1050 by Kathy Ramos RN  Outcome: Completed  Flowsheets (Taken 4/29/2025 1050)  Verbalizes/displays adequate comfort level or baseline comfort level:   Encourage patient to monitor pain and request assistance   Administer analgesics based on type and severity of pain and evaluate response   Consider cultural and social influences on pain and pain management   Assess pain using appropriate pain scale   Implement non-pharmacological measures as appropriate and evaluate response  4/28/2025 2306 by Estela Campos, RN  Outcome: Progressing  Flowsheets (Taken 4/28/2025 2306)  Verbalizes/displays adequate comfort level or baseline comfort level:   Encourage patient to monitor pain and request assistance   Administer analgesics based on type and severity of pain and evaluate response   Assess pain using appropriate pain scale   
  Problem: Discharge Planning  Goal: Discharge to home or other facility with appropriate resources  Outcome: Progressing  Flowsheets (Taken 4/28/2025 0403)  Discharge to home or other facility with appropriate resources:   Refer to discharge planning if patient needs post-hospital services based on physician order or complex needs related to functional status, cognitive ability or social support system   Identify barriers to discharge with patient and caregiver   Identify discharge learning needs (meds, wound care, etc)   Arrange for needed discharge resources and transportation as appropriate     Problem: Pain  Goal: Verbalizes/displays adequate comfort level or baseline comfort level  Outcome: Progressing  Flowsheets (Taken 4/28/2025 0403)  Verbalizes/displays adequate comfort level or baseline comfort level:   Consider cultural and social influences on pain and pain management   Administer analgesics based on type and severity of pain and evaluate response   Encourage patient to monitor pain and request assistance   Assess pain using appropriate pain scale   Implement non-pharmacological measures as appropriate and evaluate response   Notify Licensed Independent Practitioner if interventions unsuccessful or patient reports new pain     Problem: Safety - Adult  Goal: Free from fall injury  Outcome: Progressing  Flowsheets (Taken 4/28/2025 0403)  Free From Fall Injury: Instruct family/caregiver on patient safety     Problem: ABCDS Injury Assessment  Goal: Absence of physical injury  Outcome: Progressing  Flowsheets (Taken 4/28/2025 0403)  Absence of Physical Injury: Implement safety measures based on patient assessment     
  Problem: Safety - Adult  Goal: Free from fall injury  4/28/2025 2306 by Estela Campos, RN  Outcome: Progressing  Flowsheets (Taken 4/28/2025 2306)  Free From Fall Injury:   Instruct family/caregiver on patient safety   Based on caregiver fall risk screen, instruct family/caregiver to ask for assistance with transferring infant if caregiver noted to have fall risk factors  4/28/2025 1200 by Kathy Ramos RN  Outcome: Progressing  Flowsheets (Taken 4/28/2025 1200)  Free From Fall Injury: Instruct family/caregiver on patient safety     Problem: ABCDS Injury Assessment  Goal: Absence of physical injury  4/28/2025 2306 by Estela Campos, RN  Outcome: Progressing  Flowsheets (Taken 4/28/2025 2306)  Absence of Physical Injury: Implement safety measures based on patient assessment  4/28/2025 1200 by Kathy Ramos RN  Outcome: Progressing  Flowsheets (Taken 4/28/2025 1200)  Absence of Physical Injury: Implement safety measures based on patient assessment

## 2025-04-30 ENCOUNTER — CARE COORDINATION (OUTPATIENT)
Dept: CARE COORDINATION | Age: 75
End: 2025-04-30

## 2025-04-30 NOTE — CARE COORDINATION
Care Transitions Note    Initial Call - Call within 2 business days of discharge: Yes    Attempted to reach patient for transitions of care follow up. Unable to reach patient.    Outreach Attempts:   HIPAA compliant voicemail left for patient.   [employer] message sent.     Patient: Heladio Pacheco    Patient : 1950   MRN: [id number]    Reason for Admission: Right flank pain, urinary retention requiring straight cath  S/p urgent cysto right stent insert with interval ureteroscopy   Discharge Date: 25  RURS: No data recorded  Last Discharge Facility       Date Complaint Diagnosis Description Type Department Provider    25 Urinary Retention MELONIE (acute kidney injury) ... ED to Hosp-Admission (Discharged) (ADMITTED) [facility] 3W Samantha Hernandez MD; Isaac...            Was this an external facility discharge? No    Follow Up Appointment:   Patient does not have a follow up appointment scheduled at time of call.  UTR  Future Appointments         Provider Specialty Dept Phone    2025 8:00 AM Anita Howell MD Pulmonology [phone]    2025 8:00 AM Jenni Carvalho MD Family Medicine [phone]            Plan for follow-up on next business day.      MILEY Lackey, RN   Care Transition Nurse  Mobile: [phone]          
Normal mood and affect. no apparent risk to self or others.

## 2025-05-01 ENCOUNTER — CARE COORDINATION (OUTPATIENT)
Dept: CARE COORDINATION | Age: 75
End: 2025-05-01

## 2025-05-01 NOTE — CARE COORDINATION
Care Transitions Note    Initial Call - Call within 2 business days of discharge: Yes    Attempted to reach patient for transitions of care follow up. Unable to reach patient.    Outreach Attempts:   Multiple attempts to contact patient at phone numbers on file.   HIPAA compliant voicemail left for patient, spouse/partner .     Patient: Heladio Pacheco    Patient : 1950   MRN: [id number]    Reason for Admission: MELONIE, Right flank pain, urinary retention requiring straight cath   urgent cysto right stent insert with interval ureteroscopy, NN   Discharge Date: 25  RURS: No data recorded  Last Discharge Facility       Date Complaint Diagnosis Description Type Department Provider    25 Urinary Retention MELONIE (acute kidney injury) ... ED to Hosp-Admission (Discharged) (ADMITTED) [facility] 3W Samantha Hernandez MD; Isaac...            Was this an external facility discharge? No    Follow Up Appointment:   Patient has hospital follow up appointment scheduled within 14 days of discharge.    Future Appointments         Provider Specialty Dept Phone    2025 8:40 AM Jenni Carvalho MD Family Medicine [phone]    2025 8:00 AM Anita Howell MD Pulmonology [phone]    2025 8:00 AM Jenni Carvalho MD Family Medicine [phone]            No further follow-up call indicated     MILEY Lackey, RN   Care Transition Nurse  Mobile: [phone]

## 2025-05-03 NOTE — DISCHARGE SUMMARY
Hospital Medicine Discharge Summary    Patient ID: Heladio Pacheco      Patient's PCP: Jenni Carvalho MD    Admit Date: 4/27/2025     Discharge Date: 4/29/2025      Admitting Physician: Kodi Haq MD     Discharge Physician: Samantha Hernandez MD     Discharge Diagnoses:       Active Hospital Problems    Diagnosis     Paroxysmal atrial fibrillation (HCC) [I48.0]      Priority: Medium    Solitary kidney, congenital [Q60.0]     Kidney stone [N20.0]     Hydronephrosis with urinary obstruction due to renal calculus [N13.2]     Obesity, Class III, BMI 40-49.9 (morbid obesity) (HCC) [E66.813]     MELONIE (acute kidney injury) [N17.9]     Hydronephrosis with renal calculous obstruction [N13.2]     Hypercoagulable state due to atrial fibrillation (HCC) [D68.69, I48.91]        The patient was seen and examined on day of discharge and this discharge summary is in conjunction with any daily progress note from day of discharge.    Hospital Course:     Heladio Pacheco is a 75 y.o. male with pmh of hypertension and right congenital solitary kidney who presents with excruciating right flank pain for which he presented to the emergency department 3 days before presentation and was discharged home after his pain has been controlled.  At that time he was having hematuria.  He presents back because his pain is persistent hence and this time around he has not urinated for 1 whole day even though he has been drinking water.  At the emergency department ED discussed case with urology and nephrology.  Per ED physician urology will be seeing patients same night of presentation for possible stents.    DATE OF PROCEDURE:  04/28/2025     SURGEON:  Clementine Marrero MD     PREOPERATIVE DIAGNOSIS:  Right ureteral calculus, acute kidney injury, solitary right kidney.     POSTOPERATIVE DIAGNOSIS:  Right ureteral calculus, acute kidney injury, solitary right kidney.     PROCEDURES:  Cystoscopy, right ureteral stent insertion.

## 2025-05-09 ENCOUNTER — OFFICE VISIT (OUTPATIENT)
Dept: FAMILY MEDICINE CLINIC | Age: 75
End: 2025-05-09

## 2025-05-09 VITALS
SYSTOLIC BLOOD PRESSURE: 134 MMHG | HEART RATE: 76 BPM | BODY MASS INDEX: 43.18 KG/M2 | DIASTOLIC BLOOD PRESSURE: 74 MMHG | OXYGEN SATURATION: 99 % | RESPIRATION RATE: 18 BRPM | WEIGHT: 284 LBS

## 2025-05-09 DIAGNOSIS — N17.9 AKI (ACUTE KIDNEY INJURY): Primary | ICD-10-CM

## 2025-05-09 DIAGNOSIS — R73.9 HYPERGLYCEMIA: ICD-10-CM

## 2025-05-09 DIAGNOSIS — N17.9 AKI (ACUTE KIDNEY INJURY): ICD-10-CM

## 2025-05-09 DIAGNOSIS — R73.03 PREDIABETES: ICD-10-CM

## 2025-05-09 DIAGNOSIS — I10 ESSENTIAL HYPERTENSION: ICD-10-CM

## 2025-05-09 DIAGNOSIS — Z09 HOSPITAL DISCHARGE FOLLOW-UP: ICD-10-CM

## 2025-05-09 DIAGNOSIS — Z86.79 HISTORY OF ATRIAL FIBRILLATION: ICD-10-CM

## 2025-05-09 NOTE — PROGRESS NOTES
Post-Discharge Transitional Care Follow Up      Heladio Pacheco   YOB: 1950    Date of Office Visit:  5/9/2025  Date of Hospital Admission: 4/27/25  Date of Hospital Discharge: 4/29/25  Readmission Risk Score (high >=14%. Medium >=10%):No data recorded    Care management risk score Rising risk (score 2-5) and Complex Care (Scores >=6): No Risk Score On File     Non face to face  following discharge, date last encounter closed (first attempt may have been earlier): 05/01/2025     Call initiated 2 business days of discharge: Yes     MELONIE (acute kidney injury)  -     Renal Function Panel; Future  Hyperglycemia  -     FRUCTOSAMINE; Future  Prediabetes  -     FRUCTOSAMINE; Future  History of atrial fibrillation  Essential hypertension metoprolol , hyzaar    Bp is just  a little elevated  Continue current bp meds    Not in afib recently  Several blood sugars were high  The hga1c was low more recently  Ck fructosamine  Resume mounjaro after surgery per surgeon        Medical Decision Making:     Mounjaro on hold , has not taken for 2 weeks         Subjective:   HPI    Inpatient course: Discharge summary reviewed- see chart.    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  Admitted MELONIE   Has a stent now  Getting stent removed next week  Pain is gone    Usually on mounjaro 7.5  On tues day of surgery will have been off of it  2.5  week    Blood sugars noted to be high    Htn on hyzaar and metoprolol       Lab Results   Component Value Date/Time    LABA1C 5.3 02/25/2025 08:54 AM    LABA1C 6.1 11/25/2024 08:41 AM    LABA1C 6.1 08/12/2024 08:02 AM       Patient Active Problem List   Diagnosis    Colonic polyps(LAST COLO 5/15--pos mult small polyps--REPEAT 5 YR)--dr lynn,3/24  polyp return 4 yr    Tinnitus,CHRONIC    Hydrocele, left    Spermatocele,LEFT    Allergic rhinitis, seasonal    Gynecomastia, male    Diverticulitis of colon    Baker's cyst of knee-left    Primary

## 2025-05-12 ENCOUNTER — TELEPHONE (OUTPATIENT)
Dept: FAMILY MEDICINE CLINIC | Age: 75
End: 2025-05-12

## 2025-05-12 ENCOUNTER — RESULTS FOLLOW-UP (OUTPATIENT)
Dept: FAMILY MEDICINE CLINIC | Age: 75
End: 2025-05-12

## 2025-05-12 LAB
ALBUMIN SERPL-MCNC: 4.2 G/DL (ref 3.4–5)
ANION GAP SERPL CALCULATED.3IONS-SCNC: 13 MMOL/L (ref 3–16)
BUN SERPL-MCNC: 15 MG/DL (ref 7–20)
CALCIUM SERPL-MCNC: 9 MG/DL (ref 8.3–10.6)
CHLORIDE SERPL-SCNC: 105 MMOL/L (ref 99–110)
CO2 SERPL-SCNC: 23 MMOL/L (ref 21–32)
CREAT SERPL-MCNC: 1.1 MG/DL (ref 0.8–1.3)
FRUCTOSAMINE SERPL-SCNC: 236 UMOL/L (ref 205–285)
GFR SERPLBLD CREATININE-BSD FMLA CKD-EPI: 69 ML/MIN/{1.73_M2}
GLUCOSE SERPL-MCNC: 136 MG/DL (ref 70–99)
PHOSPHATE SERPL-MCNC: 3 MG/DL (ref 2.5–4.9)
POTASSIUM SERPL-SCNC: 4.2 MMOL/L (ref 3.5–5.1)
SODIUM SERPL-SCNC: 141 MMOL/L (ref 136–145)

## 2025-05-12 NOTE — TELEPHONE ENCOUNTER
Freda from [facility] called stating that she has a rejected lab for this patient.    His renal lab was contaminated with EDTA    Freda will reach out to the patient     Freda can be reached at [phone]

## 2025-05-13 ENCOUNTER — RESULTS FOLLOW-UP (OUTPATIENT)
Dept: FAMILY MEDICINE CLINIC | Age: 75
End: 2025-05-13

## 2025-06-03 ENCOUNTER — OFFICE VISIT (OUTPATIENT)
Dept: FAMILY MEDICINE CLINIC | Age: 75
End: 2025-06-03
Payer: MEDICARE

## 2025-06-03 VITALS
SYSTOLIC BLOOD PRESSURE: 132 MMHG | RESPIRATION RATE: 18 BRPM | OXYGEN SATURATION: 96 % | HEART RATE: 89 BPM | BODY MASS INDEX: 43.18 KG/M2 | WEIGHT: 284 LBS | DIASTOLIC BLOOD PRESSURE: 60 MMHG

## 2025-06-03 DIAGNOSIS — E11.9 TYPE 2 DIABETES MELLITUS WITHOUT COMPLICATION, WITH LONG-TERM CURRENT USE OF INSULIN (HCC): Primary | ICD-10-CM

## 2025-06-03 DIAGNOSIS — G47.30 SLEEP APNEA, UNSPECIFIED TYPE: ICD-10-CM

## 2025-06-03 DIAGNOSIS — I10 ESSENTIAL HYPERTENSION: ICD-10-CM

## 2025-06-03 DIAGNOSIS — Z79.4 TYPE 2 DIABETES MELLITUS WITHOUT COMPLICATION, WITH LONG-TERM CURRENT USE OF INSULIN (HCC): Primary | ICD-10-CM

## 2025-06-03 DIAGNOSIS — R73.03 PREDIABETES: ICD-10-CM

## 2025-06-03 LAB — HBA1C MFR BLD: 5.8 %

## 2025-06-03 PROCEDURE — G8417 CALC BMI ABV UP PARAM F/U: HCPCS | Performed by: INTERNAL MEDICINE

## 2025-06-03 PROCEDURE — 3078F DIAST BP <80 MM HG: CPT | Performed by: INTERNAL MEDICINE

## 2025-06-03 PROCEDURE — 1159F MED LIST DOCD IN RCRD: CPT | Performed by: INTERNAL MEDICINE

## 2025-06-03 PROCEDURE — 1160F RVW MEDS BY RX/DR IN RCRD: CPT | Performed by: INTERNAL MEDICINE

## 2025-06-03 PROCEDURE — 1124F ACP DISCUSS-NO DSCNMKR DOCD: CPT | Performed by: INTERNAL MEDICINE

## 2025-06-03 PROCEDURE — 3075F SYST BP GE 130 - 139MM HG: CPT | Performed by: INTERNAL MEDICINE

## 2025-06-03 PROCEDURE — 1036F TOBACCO NON-USER: CPT | Performed by: INTERNAL MEDICINE

## 2025-06-03 PROCEDURE — 83036 HEMOGLOBIN GLYCOSYLATED A1C: CPT | Performed by: INTERNAL MEDICINE

## 2025-06-03 PROCEDURE — 3044F HG A1C LEVEL LT 7.0%: CPT | Performed by: INTERNAL MEDICINE

## 2025-06-03 PROCEDURE — G8427 DOCREV CUR MEDS BY ELIG CLIN: HCPCS | Performed by: INTERNAL MEDICINE

## 2025-06-03 PROCEDURE — 99214 OFFICE O/P EST MOD 30 MIN: CPT | Performed by: INTERNAL MEDICINE

## 2025-06-03 NOTE — PROGRESS NOTES
Heladio Pacheco (:  1950) is a 75 y.o. male, here for evaluation of the following chief complaint(s):  Medication Check (Pt is here for a 1 month med f/u for mounjaro )      Assessment & Plan   Heladio was seen today for medication check.    Diagnoses and all orders for this visit:    Type 2 diabetes mellitus without complication, with long-term current use of insulin ([facility])  Comments:  mult high glucose   documented 6/3/25 note  on mounjaro 7.5 per cardiology  i will try to manage this  Orders:  -     Discontinue: Tirzepatide (MOUNJARO) 7.5 MG/0.5ML SOAJ pen; Inject 7.5 mg into the skin every 7 days  -     Tirzepatide (MOUNJARO) 7.5 MG/0.5ML SOAJ pen; Inject 7.5 mg into the skin every 7 days  -     Ambulatory Referral To Diabetes Education    Prediabetes  -     POCT glycosylated hemoglobin (Hb A1C)    Essential hypertension  -     Discontinue: Tirzepatide (MOUNJARO) 7.5 MG/0.5ML SOAJ pen; Inject 7.5 mg into the skin every 7 days  -     Tirzepatide (MOUNJARO) 7.5 MG/0.5ML SOAJ pen; Inject 7.5 mg into the skin every 7 days    Sleep apnea, unspecified type  -     Discontinue: Tirzepatide (MOUNJARO) 7.5 MG/0.5ML SOAJ pen; Inject 7.5 mg into the skin every 7 days  -     Tirzepatide (MOUNJARO) 7.5 MG/0.5ML SOAJ pen; Inject 7.5 mg into the skin every 7 days    BMI 40.0-44.9, adult ([facility])  -     Discontinue: Tirzepatide (MOUNJARO) 7.5 MG/0.5ML SOAJ pen; Inject 7.5 mg into the skin every 7 days  -     Tirzepatide (MOUNJARO) 7.5 MG/0.5ML SOAJ pen; Inject 7.5 mg into the skin every 7 days         Orders Placed This Encounter   Medications    DISCONTD: Tirzepatide (MOUNJARO) 7.5 MG/0.5ML SOAJ pen     Sig: Inject 7.5 mg into the skin every 7 days     Dispense:  2 mL     Refill:  0    Tirzepatide (MOUNJARO) 7.5 MG/0.5ML SOAJ pen     Sig: Inject 7.5 mg into the skin every 7 days     Dispense:  2 mL     Refill:  2    Spent time reviewing many labs  Pt does not meet criteria for dm with hga1c but multiple glucose readings

## 2025-06-03 NOTE — PATIENT INSTRUCTIONS
[facility] podcast  Exercise is Medicine, But Are We Taking it?  Dr Mike Solorzano M.D.  27 min       1. A1C >=6.5%. The test should be performed in a laboratory using a method that is NGSP certified and standardized to the DCCT assay.*  OR   2. FPG >=126 mg/dL (7 mmol/L). Fasting is defined as no caloric intake for at least 8 hours.*  OR   3. 2-hour plasma glucose >=200 mg/dL (11.1 mmol/L) during an OGTT. The test should be performed as described by the World Health Organization, using a glucose load containing the equivalent of 75 g anhydrous glucose dissolved in water.*  OR   4. In a patient with classic symptoms of hyperglycemia or hyperglycemic crisis, a random plasma glucose >=200 mg/dL (11.1 mmol/L).

## 2025-06-16 ENCOUNTER — TELEPHONE (OUTPATIENT)
Dept: ENDOCRINOLOGY | Age: 75
End: 2025-06-16

## 2025-07-10 ENCOUNTER — OFFICE VISIT (OUTPATIENT)
Dept: ENDOCRINOLOGY | Age: 75
End: 2025-07-10
Payer: MEDICARE

## 2025-07-10 DIAGNOSIS — E11.9 TYPE 2 DIABETES MELLITUS WITHOUT COMPLICATION, WITH LONG TERM CURRENT USE OF INSULIN PUMP (HCC): Primary | ICD-10-CM

## 2025-07-10 DIAGNOSIS — Z96.41 TYPE 2 DIABETES MELLITUS WITHOUT COMPLICATION, WITH LONG TERM CURRENT USE OF INSULIN PUMP (HCC): Primary | ICD-10-CM

## 2025-07-10 PROCEDURE — 3044F HG A1C LEVEL LT 7.0%: CPT

## 2025-07-10 PROCEDURE — G8417 CALC BMI ABV UP PARAM F/U: HCPCS

## 2025-07-10 PROCEDURE — G8427 DOCREV CUR MEDS BY ELIG CLIN: HCPCS

## 2025-07-10 PROCEDURE — 97802 MEDICAL NUTRITION INDIV IN: CPT

## 2025-07-10 NOTE — PROGRESS NOTES
<5.7%  Prediabetes: 5.7 - 6.4%  Diabetes: >6.4%  Glycemic control for adults with diabetes: <7.0 %     Lab Results   Component Value Date    LABA1C 5.8 06/03/2025     Lab Results   Component Value Date     08/12/2024       Lab Results   Component Value Date    CHOL 115 08/12/2024    CHOL 125 07/27/2023    CHOL 145 02/07/2022     Lab Results   Component Value Date    TRIG 97 08/12/2024    TRIG 100 07/27/2023    TRIG 150 02/07/2022     Lab Results   Component Value Date    HDL 43 (L) 08/12/2024    HDL 50 07/27/2023    HDL 44 02/07/2022     No components found for: \"LDLCALC\", \"LDLCHOLESTEROL\"  Lab Results   Component Value Date    VLDL 20 07/27/2023    VLDL 30 02/07/2022    VLDL 33 01/26/2021     No results found for: \"CHOLHDLRATIO\"    Lab Results   Component Value Date    WBC 11.4 (H) 04/29/2025    HGB 12.6 (L) 04/29/2025    HCT 35.9 (L) 04/29/2025    MCV 97.3 04/29/2025     04/29/2025       Lab Results   Component Value Date    CREATININE 1.1 05/12/2025    BUN 15 05/12/2025     05/12/2025    K 4.2 05/12/2025     05/12/2025    CO2 23 05/12/2025       Anthropometric Measurements:  Wt:   Wt Readings from Last 3 Encounters:   06/03/25 128.8 kg (284 lb)   05/09/25 128.8 kg (284 lb)   04/29/25 124.7 kg (274 lb 14.6 oz)      BMI:   BMI Readings from Last 3 Encounters:   06/03/25 43.18 kg/m²   05/09/25 43.18 kg/m²   04/29/25 41.80 kg/m²     7% Weight loss goal weight: 20#    Food and Nutrition History:   Nutrition Awareness/Previous DSMES: NO  Number of people in household: 2  Frequency of Meals Eaten away from home:1-3X WEEKLY     Food Availability Problems  Within the past 12 months, have you worried that your food would run out before you got money to buy more?No  Within the past 12 months, has the food you bought not lasted till the end of the month and you didn't have money to get more? No     Beverage consumption: Crystal light water and dt coke 50-60 oz. Encouraged to reduce   Alcohol

## 2025-07-11 ENCOUNTER — OFFICE VISIT (OUTPATIENT)
Dept: FAMILY MEDICINE CLINIC | Age: 75
End: 2025-07-11
Payer: MEDICARE

## 2025-07-11 VITALS
SYSTOLIC BLOOD PRESSURE: 130 MMHG | BODY MASS INDEX: 43.18 KG/M2 | OXYGEN SATURATION: 100 % | DIASTOLIC BLOOD PRESSURE: 62 MMHG | HEART RATE: 74 BPM | RESPIRATION RATE: 18 BRPM | WEIGHT: 284 LBS

## 2025-07-11 DIAGNOSIS — Z87.442 H/O RENAL CALCULI: ICD-10-CM

## 2025-07-11 DIAGNOSIS — I10 ESSENTIAL HYPERTENSION: ICD-10-CM

## 2025-07-11 DIAGNOSIS — E11.9 TYPE 2 DIABETES MELLITUS WITHOUT COMPLICATION, WITHOUT LONG-TERM CURRENT USE OF INSULIN (HCC): Primary | ICD-10-CM

## 2025-07-11 PROCEDURE — G8417 CALC BMI ABV UP PARAM F/U: HCPCS | Performed by: INTERNAL MEDICINE

## 2025-07-11 PROCEDURE — 1160F RVW MEDS BY RX/DR IN RCRD: CPT | Performed by: INTERNAL MEDICINE

## 2025-07-11 PROCEDURE — 3075F SYST BP GE 130 - 139MM HG: CPT | Performed by: INTERNAL MEDICINE

## 2025-07-11 PROCEDURE — 1159F MED LIST DOCD IN RCRD: CPT | Performed by: INTERNAL MEDICINE

## 2025-07-11 PROCEDURE — 99214 OFFICE O/P EST MOD 30 MIN: CPT | Performed by: INTERNAL MEDICINE

## 2025-07-11 PROCEDURE — 1124F ACP DISCUSS-NO DSCNMKR DOCD: CPT | Performed by: INTERNAL MEDICINE

## 2025-07-11 PROCEDURE — G8427 DOCREV CUR MEDS BY ELIG CLIN: HCPCS | Performed by: INTERNAL MEDICINE

## 2025-07-11 PROCEDURE — 3078F DIAST BP <80 MM HG: CPT | Performed by: INTERNAL MEDICINE

## 2025-07-11 PROCEDURE — 1036F TOBACCO NON-USER: CPT | Performed by: INTERNAL MEDICINE

## 2025-07-11 PROCEDURE — G2211 COMPLEX E/M VISIT ADD ON: HCPCS | Performed by: INTERNAL MEDICINE

## 2025-07-11 PROCEDURE — 3044F HG A1C LEVEL LT 7.0%: CPT | Performed by: INTERNAL MEDICINE

## 2025-07-11 RX ORDER — METFORMIN HYDROCHLORIDE 500 MG/1
TABLET, EXTENDED RELEASE ORAL
Qty: 180 TABLET | Refills: 1 | Status: SHIPPED | OUTPATIENT
Start: 2025-07-11

## 2025-07-11 RX ORDER — FINASTERIDE 5 MG/1
TABLET, FILM COATED ORAL
COMMUNITY
Start: 2025-06-30

## 2025-07-11 NOTE — PROGRESS NOTES
Heladio Pacheco (:  1950) is a 75 y.o. male, here for evaluation of the following chief complaint(s):  Diabetes (Pt is here for a DM f/u )      Assessment & Plan   Heladio was seen today for diabetes.    Diagnoses and all orders for this visit:    Type 2 diabetes mellitus without complication, without long-term current use of insulin (HCC), no glp1's tried ozempic ,mounjaro,brother  thyroid ca  Comments:  high morning glu  140's on no meds  add glucophage    Essential hypertension hyzaar and metoprolol  Comments:  borderline today    H/O renal calculi dr Miranda  Comments:  removed    Other orders  -     metFORMIN (GLUCOPHAGE-XR) 500 MG extended release tablet; Take 2 in the am with meals         Orders Placed This Encounter   Medications    metFORMIN (GLUCOPHAGE-XR) 500 MG extended release tablet     Sig: Take 2 in the am with meals     Dispense:  180 tablet     Refill:  1      Will start metformin  Met with dietician    Follow up 3 month  Subjective   SUBJECTIVE/OBJECTIVE:  Diabetes      DM  Saw Paulette yesterday    Clarion a lot    140 , 146 am  at night 120's     Brother  thyroid  cancer and quit mounjaro 7.5  Also had ozempic  All total  took for about a year   Did not lose much wt    On hyzaar for bp  On metoprolol    Not cking at home    Urology   Proscar , flomax   Had kidney stones and enlarged prostate  Will follow up with dr Miranda in aug      Saw [facility] last month for iron  Hemoglobin A1C (%)   Date Value   2025 5.8   2025 5.3   2024 6.1       Sodium (mmol/L)   Date Value   2025 141   2025 141   2025 136     Potassium (mmol/L)   Date Value   2025 4.2   2025 4.6     Potassium reflex Magnesium (mmol/L)   Date Value   2025 4.6   2025 4.8   2025 4.3     Chloride (mmol/L)   Date Value   2025 105   2025 106   2025 102     CO2 (mmol/L)   Date Value   2025 23   2025 25   2025 22     BUN (mg/dL)   Date Value

## 2025-07-14 RX ORDER — LOSARTAN POTASSIUM AND HYDROCHLOROTHIAZIDE 12.5; 5 MG/1; MG/1
1 TABLET ORAL DAILY
Qty: 90 TABLET | Refills: 1 | Status: SHIPPED | OUTPATIENT
Start: 2025-07-14

## 2025-08-13 DIAGNOSIS — E03.9 HYPOTHYROIDISM, UNSPECIFIED TYPE: ICD-10-CM

## 2025-08-13 RX ORDER — LEVOTHYROXINE SODIUM 175 UG/1
175 TABLET ORAL DAILY
Qty: 90 TABLET | Refills: 1 | Status: SHIPPED | OUTPATIENT
Start: 2025-08-13

## (undated) DEVICE — PEN: MARKING STD 100/CS: Brand: MEDICAL ACTION INDUSTRIES

## (undated) DEVICE — CHLORAPREP 26ML ORANGE

## (undated) DEVICE — STERILE POLYISOPRENE POWDER-FREE SURGICAL GLOVES: Brand: PROTEXIS

## (undated) DEVICE — MAJOR SET UP: Brand: MEDLINE INDUSTRIES, INC.

## (undated) DEVICE — GLOVE ORANGE PI 7 1/2   MSG9075

## (undated) DEVICE — ELECTRODE PT RET AD L9FT HI MOIST COND ADH HYDRGEL CORDED

## (undated) DEVICE — CYSTOSCOPY: Brand: MEDLINE INDUSTRIES, INC.

## (undated) DEVICE — MERCY HEALTH WEST TURNOVER: Brand: MEDLINE INDUSTRIES, INC.

## (undated) DEVICE — 3M™ STERI-STRIP™ REINFORCED ADHESIVE SKIN CLOSURES, R1547, 1/2 IN X 4 IN (12 MM X 100 MM), 6 STRIPS/ENVELOPE: Brand: 3M™ STERI-STRIP™

## (undated) DEVICE — SOL IRR SOD CHL 0.9% TITAN XL CNTNR 3000ML

## (undated) DEVICE — 3M™ TEGADERM™ TRANSPARENT FILM DRESSING FRAME STYLE, 1626W, 4 IN X 4-3/4 IN (10 CM X 12 CM), 50/CT 4CT/CASE: Brand: 3M™ TEGADERM™

## (undated) DEVICE — GUIDEWIRE URO L150CM DIA0.035IN TAPR 8CM STR TIP STD SHFT

## (undated) DEVICE — DRAPE ADOLESCENT  LAPAROTOMY

## (undated) DEVICE — SYRINGE MED 10ML LUERLOCK TIP W/O SFTY DISP

## (undated) DEVICE — SET ADMIN PRIMING 7ML L30IN 7.35LB 20 GTT 2ND RLER CLMP

## (undated) DEVICE — SOLUTION IRRIG 1000ML H2O PIC PLAS SHATTERPROOF CONTAINER

## (undated) DEVICE — SET GRAV VENT NVENT CK VLV 3 NDL FREE PRT 10 GTT

## (undated) DEVICE — 3M™ TEGADERM™ TRANSPARENT FILM DRESSING FRAME STYLE, 1628, 6 IN X 8 IN (15 CM X 20 CM), 10/CT 8CT/CASE: Brand: 3M™ TEGADERM™

## (undated) DEVICE — IV START KIT: Brand: MEDLINE INDUSTRIES, INC.

## (undated) DEVICE — Z CONVERTED USE 2275871 SPONGE GZ W4XL4IN WHT 8 PLY CURITY

## (undated) DEVICE — SHEET,DRAPE,53X77,STERILE: Brand: MEDLINE

## (undated) DEVICE — UNIVERSAL BLOCK TRAY: Brand: AVANOS*

## (undated) DEVICE — NEEDLE EPI 14GA L15CM FOR SPNL CRD STIM

## (undated) DEVICE — LIQUIBAND RAPID ADHESIVE 36/CS 0.8ML: Brand: MEDLINE

## (undated) DEVICE — GLOVE ORANGE PI 7   MSG9070

## (undated) DEVICE — NEPTUNE E-SEP SMOKE EVACUATION PENCIL, COATED, 70MM BLADE, PUSH BUTTON SWITCH: Brand: NEPTUNE E-SEP

## (undated) DEVICE — GLOVE SURG SZ 65 CRM LTX FREE POLYISOPRENE POLYMER BEAD ANTI

## (undated) DEVICE — Device: Brand: JELCO

## (undated) DEVICE — INTENDED FOR TISSUE SEPARATION, AND OTHER PROCEDURES THAT REQUIRE A SHARP SURGICAL BLADE TO PUNCTURE OR CUT.: Brand: BARD-PARKER ® DISPOSABLE SCALPELS

## (undated) DEVICE — GOWN SIRUS NONREIN XL W/TWL: Brand: MEDLINE INDUSTRIES, INC.

## (undated) DEVICE — TOWEL,OR,DSP,ST,BLUE,STD,4/PK,20PK/CS: Brand: MEDLINE

## (undated) DEVICE — SUTURE VCRL + SZ 4-0 L18IN ABSRB UD L19MM PS-2 3/8 CIR PRIM VCP496H

## (undated) DEVICE — SENZA®  PATIENT TRIAL KIT: Brand: SENZA®

## (undated) DEVICE — STANDARD HYPODERMIC NEEDLE,POLYPROPYLENE HUB: Brand: MONOJECT

## (undated) DEVICE — PORT VLV 2 W NDL FREE SMRTSITE